# Patient Record
Sex: FEMALE | Race: WHITE | Employment: OTHER | URBAN - METROPOLITAN AREA
[De-identification: names, ages, dates, MRNs, and addresses within clinical notes are randomized per-mention and may not be internally consistent; named-entity substitution may affect disease eponyms.]

---

## 2018-01-05 ENCOUNTER — APPOINTMENT (OUTPATIENT)
Dept: CT IMAGING | Age: 83
DRG: 605 | End: 2018-01-05
Attending: EMERGENCY MEDICINE
Payer: MEDICARE

## 2018-01-05 ENCOUNTER — HOSPITAL ENCOUNTER (INPATIENT)
Age: 83
LOS: 1 days | Discharge: HOME | DRG: 605 | End: 2018-01-08
Attending: EMERGENCY MEDICINE | Admitting: HOSPITALIST
Payer: MEDICARE

## 2018-01-05 ENCOUNTER — APPOINTMENT (OUTPATIENT)
Dept: GENERAL RADIOLOGY | Age: 83
DRG: 605 | End: 2018-01-05
Attending: EMERGENCY MEDICINE
Payer: MEDICARE

## 2018-01-05 DIAGNOSIS — S30.1XXA ABDOMINAL WALL HEMATOMA, INITIAL ENCOUNTER: Primary | ICD-10-CM

## 2018-01-05 PROBLEM — E78.00 HYPERCHOLESTEROLEMIA: Status: ACTIVE | Noted: 2018-01-05

## 2018-01-05 PROBLEM — I25.10 CAD (CORONARY ARTERY DISEASE): Status: ACTIVE | Noted: 2018-01-05

## 2018-01-05 PROBLEM — I25.2 HX OF MYOCARDIAL INFARCTION: Status: ACTIVE | Noted: 2018-01-05

## 2018-01-05 PROBLEM — I10 HYPERTENSION: Status: ACTIVE | Noted: 2018-01-05

## 2018-01-05 LAB
ALBUMIN SERPL-MCNC: 3.2 G/DL (ref 3.4–5)
ALBUMIN/GLOB SERPL: 1.1 {RATIO} (ref 0.8–1.7)
ALP SERPL-CCNC: 85 U/L (ref 45–117)
ALT SERPL-CCNC: 17 U/L (ref 13–56)
ANION GAP SERPL CALC-SCNC: 10 MMOL/L (ref 3–18)
AST SERPL-CCNC: 14 U/L (ref 15–37)
ATRIAL RATE: 47 BPM
BASOPHILS # BLD: 0 K/UL (ref 0–0.06)
BASOPHILS NFR BLD: 0 % (ref 0–2)
BILIRUB SERPL-MCNC: 0.7 MG/DL (ref 0.2–1)
BUN SERPL-MCNC: 20 MG/DL (ref 7–18)
BUN/CREAT SERPL: 16 (ref 12–20)
CALCIUM SERPL-MCNC: 9.7 MG/DL (ref 8.5–10.1)
CALCULATED R AXIS, ECG10: -35 DEGREES
CALCULATED T AXIS, ECG11: 116 DEGREES
CHLORIDE SERPL-SCNC: 104 MMOL/L (ref 100–108)
CK MB CFR SERPL CALC: NORMAL % (ref 0–4)
CK MB SERPL-MCNC: <1 NG/ML (ref 5–25)
CK SERPL-CCNC: 53 U/L (ref 26–192)
CO2 SERPL-SCNC: 24 MMOL/L (ref 21–32)
CREAT SERPL-MCNC: 1.22 MG/DL (ref 0.6–1.3)
DIAGNOSIS, 93000: NORMAL
DIFFERENTIAL METHOD BLD: ABNORMAL
EOSINOPHIL # BLD: 0 K/UL (ref 0–0.4)
EOSINOPHIL NFR BLD: 0 % (ref 0–5)
ERYTHROCYTE [DISTWIDTH] IN BLOOD BY AUTOMATED COUNT: 13.7 % (ref 11.6–14.5)
GLOBULIN SER CALC-MCNC: 2.9 G/DL (ref 2–4)
GLUCOSE SERPL-MCNC: 164 MG/DL (ref 74–99)
HCT VFR BLD AUTO: 33.8 % (ref 35–45)
HGB BLD-MCNC: 11.1 G/DL (ref 12–16)
INR PPP: 4.6 (ref 0.8–1.2)
LIPASE SERPL-CCNC: 47 U/L (ref 73–393)
LYMPHOCYTES # BLD: 2.6 K/UL (ref 0.9–3.6)
LYMPHOCYTES NFR BLD: 19 % (ref 21–52)
MCH RBC QN AUTO: 30.5 PG (ref 24–34)
MCHC RBC AUTO-ENTMCNC: 32.8 G/DL (ref 31–37)
MCV RBC AUTO: 92.9 FL (ref 74–97)
MONOCYTES # BLD: 0.7 K/UL (ref 0.05–1.2)
MONOCYTES NFR BLD: 5 % (ref 3–10)
NEUTS SEG # BLD: 10.3 K/UL (ref 1.8–8)
NEUTS SEG NFR BLD: 76 % (ref 40–73)
PLATELET # BLD AUTO: 273 K/UL (ref 135–420)
PMV BLD AUTO: 10.7 FL (ref 9.2–11.8)
POTASSIUM SERPL-SCNC: 5.3 MMOL/L (ref 3.5–5.5)
PROT SERPL-MCNC: 6.1 G/DL (ref 6.4–8.2)
PROTHROMBIN TIME: 42.3 SEC (ref 11.5–15.2)
Q-T INTERVAL, ECG07: 394 MS
QRS DURATION, ECG06: 88 MS
QTC CALCULATION (BEZET), ECG08: 409 MS
RBC # BLD AUTO: 3.64 M/UL (ref 4.2–5.3)
SODIUM SERPL-SCNC: 138 MMOL/L (ref 136–145)
TROPONIN I SERPL-MCNC: <0.02 NG/ML (ref 0–0.06)
VENTRICULAR RATE, ECG03: 65 BPM
WBC # BLD AUTO: 13.6 K/UL (ref 4.6–13.2)

## 2018-01-05 PROCEDURE — 36415 COLL VENOUS BLD VENIPUNCTURE: CPT | Performed by: EMERGENCY MEDICINE

## 2018-01-05 PROCEDURE — 83690 ASSAY OF LIPASE: CPT | Performed by: EMERGENCY MEDICINE

## 2018-01-05 PROCEDURE — 85025 COMPLETE CBC W/AUTO DIFF WBC: CPT | Performed by: EMERGENCY MEDICINE

## 2018-01-05 PROCEDURE — 99218 HC RM OBSERVATION: CPT

## 2018-01-05 PROCEDURE — 71045 X-RAY EXAM CHEST 1 VIEW: CPT

## 2018-01-05 PROCEDURE — 80053 COMPREHEN METABOLIC PANEL: CPT | Performed by: EMERGENCY MEDICINE

## 2018-01-05 PROCEDURE — 85610 PROTHROMBIN TIME: CPT | Performed by: EMERGENCY MEDICINE

## 2018-01-05 PROCEDURE — 93005 ELECTROCARDIOGRAM TRACING: CPT

## 2018-01-05 PROCEDURE — 74176 CT ABD & PELVIS W/O CONTRAST: CPT

## 2018-01-05 PROCEDURE — 99285 EMERGENCY DEPT VISIT HI MDM: CPT

## 2018-01-05 PROCEDURE — 74011250637 HC RX REV CODE- 250/637: Performed by: PHYSICIAN ASSISTANT

## 2018-01-05 PROCEDURE — 73564 X-RAY EXAM KNEE 4 OR MORE: CPT

## 2018-01-05 PROCEDURE — 74011250636 HC RX REV CODE- 250/636: Performed by: HOSPITALIST

## 2018-01-05 PROCEDURE — 82550 ASSAY OF CK (CPK): CPT | Performed by: EMERGENCY MEDICINE

## 2018-01-05 RX ORDER — ONDANSETRON 2 MG/ML
4 INJECTION INTRAMUSCULAR; INTRAVENOUS
Status: DISCONTINUED | OUTPATIENT
Start: 2018-01-05 | End: 2018-01-08 | Stop reason: HOSPADM

## 2018-01-05 RX ORDER — HYDROCODONE BITARTRATE AND ACETAMINOPHEN 5; 325 MG/1; MG/1
1 TABLET ORAL
Status: DISCONTINUED | OUTPATIENT
Start: 2018-01-05 | End: 2018-01-08 | Stop reason: HOSPADM

## 2018-01-05 RX ORDER — SODIUM CHLORIDE 9 MG/ML
50 INJECTION, SOLUTION INTRAVENOUS CONTINUOUS
Status: DISCONTINUED | OUTPATIENT
Start: 2018-01-05 | End: 2018-01-08 | Stop reason: HOSPADM

## 2018-01-05 RX ORDER — ACETAMINOPHEN 325 MG/1
650 TABLET ORAL
Status: DISCONTINUED | OUTPATIENT
Start: 2018-01-05 | End: 2018-01-08 | Stop reason: HOSPADM

## 2018-01-05 RX ADMIN — SODIUM CHLORIDE 50 ML/HR: 900 INJECTION, SOLUTION INTRAVENOUS at 16:08

## 2018-01-05 RX ADMIN — HYDROCODONE BITARTRATE AND ACETAMINOPHEN 1 TABLET: 5; 325 TABLET ORAL at 14:56

## 2018-01-05 NOTE — ED PROVIDER NOTES
EMERGENCY DEPARTMENT HISTORY AND PHYSICAL EXAM    Date: 1/5/2018  Patient Name: Eyad Rojas    History of Presenting Illness     Chief Complaint   Patient presents with    Knee Pain    Abdominal Pain         History Provided By: Patient    Chief Complaint: left abd pain  Duration: 1 week ago  Timing:  Gradual and Improving  Location: left abdomen  Modifying Factors: worse with movement  Associated Symptoms: nausea    Additional History (Context):   9:26 AM  Eyad Rojas is a 80 y.o. female presents to the emergency department via EMS C/O gradually improving 6/10 left sided abd pain onset 1 week ago with worsening last night. Pain is worse with movement. Associated sxs include nausea. Pt hasn't seen a doctor for it. PMHx of CAD, MI, hypoglycemia, IBS, HLD, HTN, and GI disorder. PSHx includes cholecystectomy and left TKR. Pt is a non smoker and an EtOH user. Pt reports allergy to Amoxicillin. Last BM was yesterday. Pt denies vomiting, constipation, diarrhea, Chest pain, fever, chills, SOB, and any other sxs or complaints. PCP: Claire Monroe MD    Current Outpatient Prescriptions   Medication Sig Dispense Refill    HYDROcodone-acetaminophen (NORCO) 5-325 mg per tablet Take 1 tablet by mouth every eight (8) hours as needed for Pain. 12 tablet 0    TRIAMTERENE PO Take  by mouth.  omeprazole (PRILOSEC) 20 mg capsule Take 20 mg by mouth daily.  METOCLOPRAMIDE HCL (REGLAN PO) Take 10 mg by mouth daily.  VALSARTAN (DIOVAN PO) Take 320 mg by mouth daily.  warfarin (COUMADIN) 2.5 mg tablet Take 2.5 mg by mouth daily.  PROMETHAZINE HCL (PROMETHAZINE PO) Take 12.5 mg by mouth every six (6) hours as needed.  ATORVASTATIN CALCIUM (LIPITOR PO) Take 10 mg by mouth daily.          Past History     Past Medical History:  Past Medical History:   Diagnosis Date    Arthritis     Atrial fibrillation ([address]     CAD (coronary artery disease)     Edema     Gastrointestinal disorder     High cholesterol     Hypertension     Hypoglycemia     Hypoglycemia     IBS (irritable bowel syndrome)     MI, old        Past Surgical History:  Past Surgical History:   Procedure Laterality Date    HX CHOLECYSTECTOMY      HX KNEE REPLACEMENT      HX ORTHOPAEDIC      Left TKR       Family History:  History reviewed. No pertinent family history. Social History:  Social History   Substance Use Topics    Smoking status: Never Smoker    Smokeless tobacco: None    Alcohol use 0.5 oz/week     1 Glasses of wine per week      Comment: every few weeks       Allergies: Allergies   Allergen Reactions    Amoxicillin Rash         Review of Systems   Review of Systems   Constitutional: Negative for chills and fever. Respiratory: Negative for shortness of breath. Cardiovascular: Negative for chest pain. Gastrointestinal: Positive for abdominal pain and nausea. Negative for constipation, diarrhea and vomiting. All other systems reviewed and are negative. Physical Exam     Vitals:    01/05/18 0927 01/05/18 1000 01/05/18 1051 01/05/18 1100   BP: 110/76 103/67  114/57   Pulse: 61 69 (!) 58    Resp: 16 15 18    Temp: 97.5 °F (36.4 °C)      SpO2: 96% 97% 98%      Physical Exam   Constitutional: She is oriented to person, place, and time. She appears well-developed and well-nourished. No distress. Elderly female, no acute distress. HENT:   Head: Normocephalic and atraumatic. Eyes: Pupils are equal, round, and reactive to light. Neck: Neck supple. Cardiovascular: Normal rate, regular rhythm, S1 normal, S2 normal and normal heart sounds. Pulmonary/Chest: Breath sounds normal. No respiratory distress. She has no wheezes. She has no rales. She exhibits no tenderness. Abdominal: Soft. She exhibits no distension and no mass. There is tenderness. There is guarding. There is no rebound. Diffuse abd TTP which is worse in left upper and lower. Voluntary guarding present without rebound. Musculoskeletal: Normal range of motion. She exhibits no edema or tenderness. Neurological: She is alert and oriented to person, place, and time. No cranial nerve deficit. Skin: No rash noted. Psychiatric: She has a normal mood and affect. Her behavior is normal. Thought content normal.   Nursing note and vitals reviewed. Diagnostic Study Results     Labs -     Recent Results (from the past 12 hour(s))   CBC WITH AUTOMATED DIFF    Collection Time: 01/05/18  9:24 AM   Result Value Ref Range    WBC 13.6 (H) 4.6 - 13.2 K/uL    RBC 3.64 (L) 4.20 - 5.30 M/uL    HGB 11.1 (L) 12.0 - 16.0 g/dL    HCT 33.8 (L) 35.0 - 45.0 %    MCV 92.9 74.0 - 97.0 FL    MCH 30.5 24.0 - 34.0 PG    MCHC 32.8 31.0 - 37.0 g/dL    RDW 13.7 11.6 - 14.5 %    PLATELET 866 283 - 517 K/uL    MPV 10.7 9.2 - 11.8 FL    NEUTROPHILS 76 (H) 40 - 73 %    LYMPHOCYTES 19 (L) 21 - 52 %    MONOCYTES 5 3 - 10 %    EOSINOPHILS 0 0 - 5 %    BASOPHILS 0 0 - 2 %    ABS. NEUTROPHILS 10.3 (H) 1.8 - 8.0 K/UL    ABS. LYMPHOCYTES 2.6 0.9 - 3.6 K/UL    ABS. MONOCYTES 0.7 0.05 - 1.2 K/UL    ABS. EOSINOPHILS 0.0 0.0 - 0.4 K/UL    ABS.  BASOPHILS 0.0 0.0 - 0.06 K/UL    DF AUTOMATED     PROTHROMBIN TIME + INR    Collection Time: 01/05/18 10:00 AM   Result Value Ref Range    Prothrombin time 42.3 (H) 11.5 - 15.2 sec    INR 4.6 (H) 0.8 - 1.2     EKG, 12 LEAD, INITIAL    Collection Time: 01/05/18 10:17 AM   Result Value Ref Range    Ventricular Rate 65 BPM    Atrial Rate 47 BPM    QRS Duration 88 ms    Q-T Interval 394 ms    QTC Calculation (Bezet) 409 ms    Calculated R Axis -35 degrees    Calculated T Axis 116 degrees    Diagnosis       Atrial fibrillation  Left axis deviation  Anterior infarct (cited on or before 08-FEB-2013)  Abnormal ECG  When compared with ECG of 10-DEC-2014 20:50,  No significant change was found     LIPASE    Collection Time: 01/05/18 10:29 AM   Result Value Ref Range    Lipase 47 (L) 73 - 393 U/L   CARDIAC PANEL,(CK, CKMB & TROPONIN) Collection Time: 01/05/18 10:29 AM   Result Value Ref Range    CK 53 26 - 192 U/L    CK - MB <1.0 <3.6 ng/ml    CK-MB Index  0.0 - 4.0 %     CALCULATION NOT PERFORMED WHEN RESULT IS BELOW LINEAR LIMIT    Troponin-I, Qt. <0.02 0.00 - 4.34 NG/ML   METABOLIC PANEL, COMPREHENSIVE    Collection Time: 01/05/18 10:29 AM   Result Value Ref Range    Sodium 138 136 - 145 mmol/L    Potassium 5.3 3.5 - 5.5 mmol/L    Chloride 104 100 - 108 mmol/L    CO2 24 21 - 32 mmol/L    Anion gap 10 3.0 - 18 mmol/L    Glucose 164 (H) 74 - 99 mg/dL    BUN 20 (H) 7.0 - 18 MG/DL    Creatinine 1.22 0.6 - 1.3 MG/DL    BUN/Creatinine ratio 16 12 - 20      GFR est AA 51 (L) >60 ml/min/1.73m2    GFR est non-AA 42 (L) >60 ml/min/1.73m2    Calcium 9.7 8.5 - 10.1 MG/DL    Bilirubin, total 0.7 0.2 - 1.0 MG/DL    ALT (SGPT) 17 13 - 56 U/L    AST (SGOT) 14 (L) 15 - 37 U/L    Alk. phosphatase 85 45 - 117 U/L    Protein, total 6.1 (L) 6.4 - 8.2 g/dL    Albumin 3.2 (L) 3.4 - 5.0 g/dL    Globulin 2.9 2.0 - 4.0 g/dL    A-G Ratio 1.1 0.8 - 1.7         Radiologic Studies -   CT ABD PELV WO CONT   Final Result      XR CHEST PORT   Final Result      XR KNEE RT MIN 4 V    (Results Pending)     CT Results  (Last 48 hours)               01/05/18 1112  CT ABD PELV WO CONT Final result    Impression:  IMPRESSION:       1. Moderately large left-sided rectus sheath hematoma with small amount of   contiguous intrapelvic, extraperitoneal blood products. 2. Small hiatal hernia. Diverticulosis without evidence of diverticulitis. 3. Right adnexal cyst, slightly enlarged in the interval from prior exam of   1/17/2015. As previously, nonemergent outpatient ultrasound follow-up is   recommended if not performed in the interval from prior CT exam.   4. Prior cholecystectomy and common bile duct reservoir effect. Narrative:  EXAM: CT of the abdomen and pelvis       INDICATION: Pelvic pain, radiating to the back.        COMPARISON: 1/17/2015       TECHNIQUE: Axial CT imaging of the abdomen and pelvis was performed without oral   or intravenous contrast. Multiplanar reformats were generated. One or more dose reduction techniques were used on this CT: automated exposure   control, adjustment of the mAs and/or kVp according to patient's size, and   iterative reconstruction techniques. The specific techniques utilized on this CT   exam have been documented in the patient's electronic medical record.        _______________       FINDINGS:       LOWER CHEST: Minor dependent atelectasis is present without evidence of focal   pneumonic opacity or pleural effusion. Cardiac size is normal. There is no   pericardial effusion. LIVER, BILIARY: The unenhanced appearance of the liver is within normal limits. No intrahepatic biliary ductal dilatation. Postop changes of cholecystectomy   with reservoir effect redemonstrated involving the common bile duct, similar to   slightly decreased in magnitude from prior abdominal pelvic CT exam.       PANCREAS: Mild diffuse fatty infiltration of the pancreas is present. SPLEEN: Normal.       ADRENALS: Normal.       KIDNEYS/URETERS/BLADDER: No hydronephrosis or nephrolithiasis. PELVIC ORGANS: Uterus has an unremarkable CT appearance. Hypoattenuating oval   right adnexal nodule redemonstrated, measuring approximately 6.3 cm in size. VASCULATURE: Diffuse aortobiiliac atherosclerotic calcification is present   without evidence of aneurysmal dilatation. LYMPH NODES: There are scattered subcentimeter mesenteric and retroperitoneal   lymph nodes, without evidence of abdominal or pelvic adenopathy. GASTROINTESTINAL TRACT: A small hiatal hernia is present. Small intestine and   large intestine are normal in course and caliber. No bowel obstruction. No free   intraperitoneal gas. Extensive colonic diverticulosis is present without   evidence to suggest associated diverticulitis.        BONES: No acute or aggressive osseous abnormalities identified. Levorotatory   scoliosis is redemonstrated. Transitional lumbosacral anatomy is present, with   lumbarization of the first sacral segment. There is anterolisthesis of L5 on S1,   unchanged, with multilevel spot spondylosis and lower lumbar facet   osteoarthrosis present. OTHER: Within the substance of the left rectus abdominis musculature, there is a   hematoma present which measures approximately 10.0 x 6.5 x 16 cm. There is a   small amount of contiguous extraperitoneal blood products present within the   superior and left lateral portions of the pelvis, with mild mass effect on the   adjacent bladder.       _______________                 As read by the radiologist.   CXR Results  (Last 48 hours)               01/05/18 1031  XR CHEST PORT Final result    Impression:  Impression:   No radiographic evidence of an acute abnormality. Narrative:  Chest, single view       Indication: Chest and upper abdominal pain       Comparison: 1/17/2015       Findings:  Portable upright AP view of the chest was obtained. The   cardiomediastinal silhouette is within normal limits. Tortuous and   atherosclerotic thoracic aorta. No central pulmonary vascular congestion. Lungs   are hyperinflated. No acute airspace opacity. Mild central bronchiectasis. Minimal left basilar atelectasis/scarring. No pleural effusion nor   pneumothorax. No acute osseous abnormality. As read by the radiologist.     Medications given in the ED-  Medications - No data to display      Medical Decision Making   I am the first provider for this patient. I reviewed the vital signs, available nursing notes, past medical history, past surgical history, family history and social history. Vital Signs-Reviewed the patient's vital signs. Pulse Oximetry Analysis - 96% on room air. Cardiac Monitor:  Rate: 65 bpm  Rhythm: AFIB    EKG interpretation: (Preliminary)  AFIB at 65 bpm. LAD.  Anterior infarct, age undetermined. EKG read by Maame Webster MD at 10:17 AM     Records Reviewed: Nursing Notes    Provider Notes (Medical Decision Making):   INITIAL CLINICAL IMPRESSION and PLANS:  The patient presents with the primary complaint(s) of: abd pain. r/o pancreatitis, diverticulitis, colitis, IBS, electrolyte abnormalities, ischemic bowel    Considering the above, my initial management plan to evaluate and therapeutic interventions include the following and as noted in the orders:    1.  CBC, CMP, UA, Cardiac Panel, Lipase  2. CT Abd pelv, CXR    Recorded by Lazara Walker ED Scribe, as dictated by Maame Webster MD.     Procedures:  Procedures    ED Course:   9:26 AM Initial assessment performed. The patients presenting problems have been discussed, and they are in agreement with the care plan formulated and outlined with them. I have encouraged them to ask questions as they arise throughout their visit. 11:47 AM   New angela Daughter at bedside states that pt fell out of bed when she got up to go to the bathroom. Pt got dizzy and fell without LOC. 11:52 AM Discussed patient's history, exam, and available diagnostics results with Jarad Shannon MD (General Surgery), who advised that we admit pt to Medical Floor and that we apply ice packs. 12:22 PM Discussed patient's history, exam, and available diagnostics results with Helio Alcantara MD, internal medicine, who agree with admitting pt to the medical floor. Diagnosis and Disposition       Critical Care Time:     Core Measures:  For Hospitalized Patients:    1. Hospitalization Decision Time:  The decision to hospitalize the patient was made by Maame Webster MD at 11:52 AM on 1/5/2018    2.  Aspirin: Aspirin was not given because the patient is a bleeding risk    11:51 AM  Patient is being admitted to the hospital by Helio Alcantara MD. The results of their tests and reasons for their admission have been discussed with them and/or available family. They convey agreement and understanding for the need to be admitted and for their admission diagnosis. CONDITIONS ON ADMISSION:  Sepsis is not present at the time of admission. Deep Vein Thrombosis is not present at the time of admission. Thrombosis is not present at the time of admission. Urinary Tract Infection is not present at the time of admission. Pneumonia is not present at the time of admission. MRSA is not present at the time of admission. Wound infection is not present at the time of admission. Pressure Ulcer is not present at the time of admission. CLINICAL IMPRESSION:    1. Abdominal wall hematoma, initial encounter        PLAN:  1. ADMIT    _______________________________    Attestations: This note is prepared by Darshan Mariee, acting as Scribe for Roseline Walker MD.    Roseline Walker MD:  The scribe's documentation has been prepared under my direction and personally reviewed by me in its entirety.   I confirm that the note above accurately reflects all work, treatment, procedures, and medical decision making performed by me.  _______________________________

## 2018-01-05 NOTE — PROGRESS NOTES
Pharmacy Dosing Services: Warfarin    Consult for Warfarin Dosing by Pharmacy by JOSE RAFAEL Sprague  Consult provided for this 80 y.o.  female , for indication of Atrial Fibrillation. Pt taking warfarin prior to admission on chronic anticoagulation. Dose to achieve an INR goal of 2-3    INR = 4.6   Warfarin dose HELD on 1/5/18    Significant drug interactions: none  LABS    PT/INR Lab Results   Component Value Date/Time    INR 4.6 01/05/2018 10:00 AM      Platelets Lab Results   Component Value Date/Time    PLATELET 997 63/71/4940 09:24 AM      H/H     Lab Results   Component Value Date/Time    HGB 11.1 01/05/2018 09:24 AM        Warfarin Administrations (last 168 hours)       None          Pharmacy to follow daily and will provide subsequent Warfarin dosing based on clinical status.   JOSÉ Langley  Contact information  [phone]

## 2018-01-05 NOTE — ROUTINE PROCESS
TRANSFER - OUT REPORT:    Verbal Bedside report given to [facility] RN on Wileen Few  being transferred to [facility] bed 336 for routine progression of care       Report consisted of patients Situation, Background, Assessment and   Recommendations(SBAR). Information from the following report(s) SBAR, Kardex, ED Summary, Recent Results and Cardiac Rhythm A-Fib was reviewed with the receiving nurse. Lines:   Peripheral IV 01/05/18 Right Hand (Active)   Site Assessment Clean, dry, & intact 1/5/2018 10:08 AM   Phlebitis Assessment 0 1/5/2018 10:08 AM   Infiltration Assessment 0 1/5/2018 10:08 AM   Dressing Status Clean, dry, & intact; Occlusive 1/5/2018 10:08 AM   Dressing Type Transparent 1/5/2018 10:08 AM   Hub Color/Line Status Pink;Flushed;Patent 1/5/2018 10:08 AM   Action Taken Blood drawn 1/5/2018 10:08 AM        Opportunity for questions and clarification was provided.       Patient transported with:   Monitor  Registered Nurse  Tech

## 2018-01-05 NOTE — IP AVS SNAPSHOT
[address] [address] 
[phone] Patient: Jeremy Marsh MRN: [id number] FKN:[id number] About your hospitalization You were admitted on:  January 5, 2018 You last received care in the:  [facility] [facility] You were discharged on:  January 8, 2018 Why you were hospitalized Your primary diagnosis was:  Abdominal Wall Hematoma Your diagnoses also included:  Cad (Coronary Artery Disease), Hypercholesterolemia, Hypertension, Hx Of Myocardial Infarction, Elevated Inr Follow-up Information Follow up With Details Comments Contact Info Zachary Lai MD Schedule an appointment as soon as possible for a visit in 3 days For follow up regarding recent hospitalization [address] [facility] [address] [facility] 
[phone] Discharge Orders None A check julissa indicates which time of day the medication should be taken. My Medications START taking these medications Instructions Each Dose to Equal  
 Morning Noon Evening Bedtime  
 aspirin 81 mg chewable tablet Your last dose was: Your next dose is: Take 1 Tab by mouth daily. 81 mg CONTINUE taking these medications Instructions Each Dose to Equal  
 Morning Noon Evening Bedtime DIOVAN PO Your last dose was: Your next dose is: Take 320 mg by mouth daily. 320 mg HYDROcodone-acetaminophen 5-325 mg per tablet Commonly known as:  Sherley Ellington Your last dose was: Your next dose is: Take 1 tablet by mouth every eight (8) hours as needed for Pain. 1 Tab  
    
   
   
   
  
 LIPITOR PO Your last dose was: Your next dose is: Take 10 mg by mouth daily. 10 mg  
    
   
   
   
  
 omeprazole 20 mg capsule Commonly known as:  PRILOSEC Your last dose was: Your next dose is: Take 20 mg by mouth daily. 20 mg PROMETHAZINE PO Your last dose was: Your next dose is: Take 12.5 mg by mouth every six (6) hours as needed. 12.5 mg  
    
   
   
   
  
 REGLAN PO Your last dose was: Your next dose is: Take 10 mg by mouth daily. 10 mg  
    
   
   
   
  
 TRIAMTERENE PO Your last dose was: Your next dose is: Take  by mouth. STOP taking these medications COUMADIN 2.5 mg tablet Generic drug:  warfarin Where to Get Your Medications Information on where to get these meds will be given to you by the nurse or doctor. ! Ask your nurse or doctor about these medications  
  aspirin 81 mg chewable tablet Discharge Instructions DISCHARGE SUMMARY from Nurse PATIENT INSTRUCTIONS: 
 
 
F-face looks uneven A-arms unable to move or move unevenly S-speech slurred or non-existent T-time-call 911 as soon as signs and symptoms begin-DO NOT go Back to bed or wait to see if you get better-TIME IS BRAIN. Warning Signs of HEART ATTACK Call 911 if you have these symptoms: 
? Chest discomfort. Most heart attacks involve discomfort in the center of the chest that lasts more than a few minutes, or that goes away and comes back. It can feel like uncomfortable pressure, squeezing, fullness, or pain. ? Discomfort in other areas of the upper body. Symptoms can include pain or discomfort in one or both arms, the back, neck, jaw, or stomach. ? Shortness of breath with or without chest discomfort. ? Other signs may include breaking out in a cold sweat, nausea, or lightheadedness. Don't wait more than five minutes to call [address]! Fast action can save your life. Calling 911 is almost always the fastest way to get lifesaving treatment. Emergency Medical Services staff can begin treatment when they arrive  up to an hour sooner than if someone gets to the hospital by car. The discharge information has been reviewed with the patient. The patient verbalized understanding. Discharge medications reviewed with the patient and appropriate educational materials and side effects teaching were provided. ___________________________________________________________________________________________________________________________________ Taking Aspirin to Prevent Heart Attack and Stroke: Care Instructions Your Care Instructions Aspirin acts as a \"blood thinner. \" It prevents blood clots from forming. When taken during and after a heart attack, it can reduce your chance of dying. And it's used if you have a stent in your coronary artery. Also, aspirin helps certain people lower their risk of a heart attack or stroke. Be sure you know what dose of aspirin to take and how often to take it. Low-dose aspirin is typically 81 mg. But the dose for daily aspirin can range from 81 mg to 325 mg. Taking aspirin every day can cause bleeding. It may not be safe if you have stomach ulcers. And it may not be safe if you have high blood pressure that is not controlled. If you take aspirin pills every day, do not take ones that have other ingredients such as caffeine or sodium. Before you start to take aspirin, tell your doctor all the medicines, vitamins, herbal products, and supplements you take. Follow-up care is a key part of your treatment and safety. Be sure to make and go to all appointments, and call your doctor if you are having problems. It's also a good idea to know your test results and keep a list of the medicines you take. How can you care for yourself at home? · Take aspirin with a full glass of water unless your doctor tells you not to. Do not lie down right after you take it. · If you have a stent in your coronary artery, take your aspirin as your heart doctor says to. If another doctor says to stop taking the aspirin for any reason, talk to your heart doctor before you stop. · Do not chew or crush the coated or sustained-release forms of aspirin. · Ask your doctor if you can drink alcohol while you take aspirin. And ask how much you can drink. Too much alcohol with aspirin can cause stomach bleeding. · Do not take aspirin if you are pregnant, unless your doctor says it is okay. · Keep all aspirin out of children's reach. · Throw aspirin away if it starts to smell like vinegar. · Do not take aspirin if you have gout or if you take prescription blood thinners, unless your doctor has told you to. · Do not take prescription or over-the-counter medicines, vitamins, herbal products, or supplements without talking to your doctor first. Miguel Guzmanl the label before you take another over-the-counter medicine. Many contain aspirin. So they could cause you to take too much aspirin. · Talk with your doctor before you take a pain medicine. Ask which type of medicine you can take and how to take it safely with aspirin. · Tell your doctor or dentist before a surgery or procedure that you take aspirin. He or she will tell you if you should stop taking aspirin before your surgery or procedure. Make sure that you understand exactly what your doctor wants you to do. Where can you learn more? Go to http://juan jose-cindy.info/. Enter O976 in the search box to learn more about \"Taking Aspirin to Prevent Heart Attack and Stroke: Care Instructions. \" Current as of: September 21, 2016 Content Version: 11.4 © 1209-6023 [employer].  Care instructions adapted under license by [employer] (which disclaims liability or warranty for this information). If you have questions about a medical condition or this instruction, always ask your healthcare professional. [place] [address] any warranty or liability for your use of this information. Learning About Your Stroke Risk When You Have Atrial Fibrillation How does atrial fibrillation affect your stroke risk? Normally, the heart beats in a strong, steady rhythm. In atrial fibrillation, the two upper parts of the heart (the atria) quiver, or fibrillate, and the heart does not beat in a regular rhythm. Your heart rate also may be faster than normal. 
This can be dangerous because if the heartbeat isn't strong and steady, blood can collect, or pool, in the heart. And pooled blood is more likely to form clots. Clots can travel to the brain, block blood flow, and cause a stroke. A stroke can cause sudden numbness or weakness of the face, arm, or leg, especially on one side of the body. Strokes can also cause sudden confusion, trouble speaking or understanding, or even trouble seeing in one or both eyes. Strokes can even cause death. Atrial fibrillation increases your stroke risk. But not everyone with atrial fibrillation has the same stroke risk. How do you know what your stroke risk is? Your doctor can help you know your risk based on your age, sex, and health. Things that raise your risk are called risk factors. The more risk factors you have, the higher your risk. Risk factors for stroke include: · Age. Being older than 72 raises your risk. · Being female. Women are at higher risk. · Other health problems. You may have other health problems that raise your risk. These include: 
¨ Heart failure. ¨ High blood pressure. ¨ A previous stroke or transient ischemic attack (TIA). ¨ Heart attack, peripheral arterial disease, or other blood vessel disease. ¨ Diabetes.  
Your doctor will use a kind of scorecard to add up your risk factors and estimate your risk for stroke. This score can help you and your doctor decide how to lower your risk. Should you take medicine to lower your stroke risk? When you know what your stroke risk is, you and your doctor can talk about your options. You'll decide whether or not to take medicine-aspirin or anticoagulants-to help prevent blood clots. These medicines are often called blood thinners, but they don't actually thin your blood. Instead, they increase the time it takes for a blood clot to form. Blood thinners lower the risk of stroke in people who have atrial fibrillation. But how much your risk will be lowered depends on how high your risk was to start with. There are risks to taking a blood thinner. When your blood clots more slowly, you have a higher risk of bleeding problems. These problems include bleeding problems in and around the brain, bleeding in the stomach and intestines, bruising and bleeding if you are hurt, and serious skin rash. You and your doctor can compare your risk of stroke with your risk of bleeding from the medicine. You can also discuss how you feel about taking medicine every day. Follow-up care is a key part of your treatment and safety. Be sure to make and go to all appointments, and call your doctor if you are having problems. It's also a good idea to know your test results and keep a list of the medicines you take. Where can you learn more? Go to http://juan jose-cindy.info/. Enter O716 in the search box to learn more about \"Learning About Your Stroke Risk When You Have Atrial Fibrillation. \" Current as of: September 21, 2016 Content Version: 11.4 © 4017-1191 Healthwise, Incorporated. Care instructions adapted under license by [employer] (which disclaims liability or warranty for this information).  If you have questions about a medical condition or this instruction, always ask your healthcare professional. April Boyd[employer] disclaims any warranty or liability for your use of this information. High INR Test Result: Care Instructions Your Care Instructions You had a blood test to check how long it takes your blood to clot. This test is called a PT or prothrombin time test. The result of the test is called the INR level. A high INR level can happen when you take warfarin (Coumadin). Warfarin helps prevent blood clots. To do this, it slows the amount of time it takes for your blood to clot. This raises your INR level. The INR goal for people who take warfarin is usually from 2 to 3.5. A value higher than 3.5 increases the risk of bleeding problems. Many things can affect the way warfarin works. Some natural health products and other medicines can make warfarin work too well. That can raise the risk of bleeding. If you drink a lot of alcohol, that may raise your INR. And severe diarrhea or vomiting can also raise your INR. The best way to lower your INR will depend on several things. In some cases, the doctor may have you stop taking warfarin for a few days. You may also be given other medicines to take. You will need to be tested often to make sure your INR level is going down. You will also need to watch for signs of bleeding. The doctor has checked you carefully, but problems can develop later. If you notice any problems or new symptoms, get medical treatment right away. Follow-up care is a key part of your treatment and safety. Be sure to make and go to all appointments, and call your doctor if you are having problems. It's also a good idea to know your test results and keep a list of the medicines you take. How can you care for yourself at home? Be careful with medicines and foods · Don't start or stop taking any medicines, vitamins, or natural remedies unless you first talk to your doctor.  
· Keep the amount of vitamin K in your diet about the same from day to day. Do not suddenly eat a lot more or a lot less food that is rich in vitamin K than you usually do. Vitamin K affects how warfarin works and how your blood clots. · Avoid cranberry juice and other cranberry products. They can increase the effects of warfarin. · Limit your use of alcohol. Avoid bleeding by preventing falls · Wear slippers or shoes with nonskid soles. · Remove throw rugs and clutter. · Rearrange furniture and electrical cords to keep them out of walking paths. · Keep stairways, porches, and outside walkways well lit. Use night-lights in hallways and bathrooms. · Be extra careful when you work with sharp tools or knives. When should you call for help? Call 911 anytime you think you may need emergency care. For example, call if: 
· You have a sudden, severe headache that is different from past headaches. Call your doctor now or seek immediate medical care if: 
· You have any abnormal bleeding, such as: 
¨ Nosebleeds. ¨ Vaginal bleeding that is different (heavier, more frequent, at a different time of the month) than what you are used to. ¨ Bloody or black stools, or rectal bleeding. ¨ Bloody or pink urine. Watch closely for changes in your health, and be sure to contact your doctor if you have any problems. Where can you learn more? Go to [employer]D [employer].be Enter C178 in the search box to learn more about \"High INR Test Result: Care Instructions. \"  
© 0153-5529 Healthwise, Incorporated. Care instructions adapted under license by [facility] (which disclaims liability or warranty for this information). This care instruction is for use with your licensed healthcare professional. If you have questions about a medical condition or this instruction, always ask your healthcare professional. [place] [address] any warranty or liability for your use of this information. Content Version: 91.3.609247; Current as of: November 20, 2015 Patient armband removed and shredded [employer] Announcement We are excited to announce that we are making your provider's discharge notes available to you in Art Sumo. You will see these notes when they are completed and signed by the physician that discharged you from your recent hospital stay. If you have any questions or concerns about any information you see in Art Sumo, please call the Health Information Department where you were seen or reach out to your Primary Care Provider for more information about your plan of care. Introducing [facility]! [facility] introduces Art Sumo patient portal. Now you can access parts of your medical record, email your doctor's office, and request medication refills online. 1. In your internet browser, go to [email or web address] 2. Click on the First Time User? Click Here link in the Sign In box. You will see the New Member Sign Up page. 3. Enter your Art Sumo Access Code exactly as it appears below. You will not need to use this code after youve completed the sign-up process. If you do not sign up before the expiration date, you must request a new code. · Art Sumo Access Code: [id number] Expires: 4/8/2018 12:39 PM 
 
4. Enter the last four digits of your Social Security Number (xxxx) and Date of Birth (mm/dd/yyyy) as indicated and click Submit. You will be taken to the next sign-up page. 5. Create a Art Sumo ID. This will be your Art Sumo login ID and cannot be changed, so think of one that is secure and easy to remember. 6. Create a Art Sumo password. You can change your password at any time. 7. Enter your Password Reset Question and Answer. This can be used at a later time if you forget your password. 8. Enter your e-mail address. You will receive e-mail notification when new information is available in [address] 9. Click Sign Up. You can now view and download portions of your medical record. 10. Click the Download Summary menu link to download a portable copy of your medical information. If you have questions, please visit the Frequently Asked Questions section of the [employer] website. Remember, [employer] is NOT to be used for urgent needs. For medical emergencies, dial 911. Now available from your iPhone and Android! Providers Seen During Your Hospitalization Provider Specialty Primary office phone Sandro Bassett MD Emergency Medicine [phone] Jenifer Manzanares MD Family Practice [phone] Your Primary Care Physician (PCP) Primary Care Physician Office Phone Office Fax [address], [address] [facility] [place] [phone] You are allergic to the following Allergen Reactions Amoxicillin Rash Recent Documentation Weight BMI OB Status Smoking Status 75.3 kg 30.34 kg/m2 Menopause Never Smoker Emergency Contacts Name Discharge Info Relation Home Work Mobile VashtiJuan DISCHARGE CAREGIVER [3] Child [2] [phone] Patient Belongings The following personal items are in your possession at time of discharge: 
     Visual Aid: Glasses, At bedside   Hearing Aids/Status: At home  Home Medications: None   Jewelry: Earrings       Other Valuables: Cell Phone Please provide this summary of care documentation to your next provider. Signatures-by signing, you are acknowledging that this After Visit Summary has been reviewed with you and you have received a copy. Patient Signature:  ____________________________________________________________ Date:  ____________________________________________________________  
  
Quinn Jaquez Provider Signature:  ____________________________________________________________ Date:  ____________________________________________________________

## 2018-01-05 NOTE — ROUTINE PROCESS
TRANSFER - IN REPORT:    Verbal report received from [facility] RN(name) on Stephany Mcadams  being received from ED(unit) for routine progression of care      Report consisted of patients Situation, Background, Assessment and   Recommendations(SBAR). Information from the following report(s) SBAR, Kardex, ED Summary, Procedure Summary, Intake/Output, MAR, Med Rec Status, Cardiac Rhythm Afib and Alarm Parameters  was reviewed with the receiving nurse. Opportunity for questions and clarification was provided. Assessment completed upon patients arrival to unit and care assumed.

## 2018-01-05 NOTE — H&P
History & Physical    Patient: Maura Dailey MRN: [id number]  CSN: [id number]    YOB: 1930  Age: 80 y.o. Sex: female      DOA: 1/5/2018  Primary Care Provider:  Emilie Mckeon MD      Assessment/Plan     Patient Active Problem List   Diagnosis Code    Abdominal pain, other specified site R10.9    Atrial fibrillation ([address] I48.91    Constipation K59.00    Encephalopathy, unspecified G93.40    Abdominal wall hematoma S30. 1XXA    CAD (coronary artery disease) I25.10    Hypercholesterolemia E78.00    Hypertension I10    Hx of myocardial infarction I25.2       1. Abdominal Wall Hematoma  2. Atrial Fibrillation, Chronic  3. Chronic Anticoagulation on Coumadin  4. CAD  5. Hypertension  6. Hx of MI  7. Knee Pain    1. Admit for further care. ED provider spoke to Dr Reji Cao in regards to this patient - Dr Reji Cao said to admit and apply ice packs to the area where the hematoma is located. Will add Tylenol for mild pain control, fevers, or headaches, and add Norco 5-325mg for moderate pain. 2. Patient has a hx of atrial fibrillation and currently is on coumadin. The patient's INR in the ED this afternoon was 4.6. Placed an order for pharmacy to assist in dosing coumadin, likely to be held until INR back to therapeutic range. HR is controlled at this time, hovering in the 60's at time of exam. Patient is unsure of medications that she takes at home. She knows that she takes coumadin, but unsure of meds for afib. Will have her son bring her medication list home and we will update it when received. 3. As above. Pharmacy to assist in dosing. 4. Patient reported, but as aforementioned unsure of current medication list. Will update when available. 5. BP was low-normal at time of exam in the ED, at 101/50mmHg. Will hold antihypertensive medications at this time until med list has been received. Per the patient's daughter, she has not received any of her medications today.   7. Complaints of knee pain after a fall, underwent knee radiographs in the ED. Reading is pending. Will have PT/OT eval and treat as the patient tolerates it. DVT Prophylaxis - Supratherapeutic INR currently, will have pharmacy assist in dosing coumadin. Dispo: 1-2 days. Will need PT to determine functional status and give recs. Will await further surgical input if needed. Estimated length of stay : 1-2 nights. CC: Abdominal Pain       HPI:     Stephany Mcadams is a 80 y.o. female who has a history of HTN, atrial fibrillation, CAD, hypercholesterolemia, hx of MI, episodes of hypoglycemia, and PSHx of cholecystectomy and knee replacement presents to the ED today with complaints of abdominal pain. She states the pain has actually improved over the last day or so, but has been present since around Barbara. The patient states that this morning she awoke and went to use the lavatory and one way or another, she does not recall, ended up on the ground. She denies LOC, did not hit her head. She states her son, who lives with her, assisted her up off of the floor, and the patient then called EMS to transport her to the ED. In the ED, she was found to have an abdominal wall hematoma, and medicine has been asked to admit for further care. The patient admits to occasional drinking, denies smoking. Complaints of abdominal pain and right knee pain. Denies any vomiting, constipation, diarrhea, chills, SOB. Past Medical History:   Diagnosis Date    Arthritis     Atrial fibrillation ([address]     CAD (coronary artery disease)     Edema     Gastrointestinal disorder     High cholesterol     Hypertension     Hypoglycemia     Hypoglycemia     IBS (irritable bowel syndrome)     MI, old        Past Surgical History:   Procedure Laterality Date    HX CHOLECYSTECTOMY      HX KNEE REPLACEMENT      HX ORTHOPAEDIC      Left TKR       History reviewed. No pertinent family history.     Social History     Social History    Marital status:      Spouse name: N/A    Number of children: N/A    Years of education: N/A     Social History Main Topics    Smoking status: Never Smoker    Smokeless tobacco: None    Alcohol use 0.5 oz/week     1 Glasses of wine per week      Comment: every few weeks    Drug use: None    Sexual activity: Not Asked     Other Topics Concern    None     Social History Narrative       Prior to Admission medications    Medication Sig Start Date End Date Taking? Authorizing Provider   HYDROcodone-acetaminophen (NORCO) 5-325 mg per tablet Take 1 tablet by mouth every eight (8) hours as needed for Pain. 1/17/15   Rubens Tse MD   TRIAMTERENE PO Take  by mouth. Reinaldo Cordero MD   omeprazole (PRILOSEC) 20 mg capsule Take 20 mg by mouth daily. Historical Provider   METOCLOPRAMIDE HCL (REGLAN PO) Take 10 mg by mouth daily. Reinaldo Cordero MD   VALSARTAN (DIOVAN PO) Take 320 mg by mouth daily. Reinaldo Cordero MD   warfarin (COUMADIN) 2.5 mg tablet Take 2.5 mg by mouth daily. Reinaldo Cordero MD   PROMETHAZINE HCL (PROMETHAZINE PO) Take 12.5 mg by mouth every six (6) hours as needed. Reinaldo Cordero MD   ATORVASTATIN CALCIUM (LIPITOR PO) Take 10 mg by mouth daily. Reinaldo Cordero MD       Allergies   Allergen Reactions    Amoxicillin Rash       Review of Systems  Gen: No fever, chills, malaise, weight loss/gain. Heent: No headache, rhinorrhea, epistaxis, ear pain, hearing loss, sinus pain, neck pain/stiffness, sore throat. Heart: No chest pain, palpitations, NEIL, pnd, or orthopnea. Resp: No cough, hemoptysis, wheezing and shortness of breath. GI: No nausea, vomiting, diarrhea, constipation, melena or hematochezia. Reports abdominal pain  : No urinary obstruction, dysuria or hematuria. Derm: No rash, new skin lesion or pruritis. Musc/skeletal: Reports right knee pain  Vasc: No edema, cyanosis or claudication. Endo: No heat/cold intolerance, no polyuria,polydipsia or polyphagia. Neuro:  No unilateral weakness, numbness, tingling. No seizures. Heme: No easy bruising or bleeding. Physical Exam:     Physical Exam:  Visit Vitals    BP 97/55    Pulse 63    Temp 97.5 °F (36.4 °C)    Resp 15    SpO2 96%      O2 Device: Room air    Temp (24hrs), Av.5 °F (36.4 °C), Min:97.5 °F (36.4 °C), Max:97.5 °F (36.4 °C)             General:  Awake, cooperative, no distress. Head:  Normocephalic, without obvious abnormality, atraumatic. Eyes:  Conjunctivae/corneas clear, sclera anicteric, PERRL, EOMs intact. Nose: Nares normal. No drainage or sinus tenderness. Throat: Lips, mucosa, and tongue normal.    Neck: Supple, symmetrical, trachea midline, no adenopathy. Lungs:   Clear to auscultation bilaterally. Heart:  Irregular rhythm, S1, S2 normal, no murmur, click, rub or gallop. Abdomen: Soft, diffusely tender, more focal tenderness in LUQ and LLQ with guarding. Bowel sounds normal. No masses,  No organomegaly. Extremities: Extremities normal, atraumatic, no cyanosis or edema. Capillary refill normal.   Pulses: 2+ and symmetric all extremities. Skin: Skin color pink, turgor normal. No rashes or lesions   Neurologic: CNII-XII intact. No focal motor or sensory deficit. Labs Reviewed: All lab results for the last 24 hours reviewed. Recent Results (from the past 24 hour(s))   CBC WITH AUTOMATED DIFF    Collection Time: 18  9:24 AM   Result Value Ref Range    WBC 13.6 (H) 4.6 - 13.2 K/uL    RBC 3.64 (L) 4.20 - 5.30 M/uL    HGB 11.1 (L) 12.0 - 16.0 g/dL    HCT 33.8 (L) 35.0 - 45.0 %    MCV 92.9 74.0 - 97.0 FL    MCH 30.5 24.0 - 34.0 PG    MCHC 32.8 31.0 - 37.0 g/dL    RDW 13.7 11.6 - 14.5 %    PLATELET 811 018 - 066 K/uL    MPV 10.7 9.2 - 11.8 FL    NEUTROPHILS 76 (H) 40 - 73 %    LYMPHOCYTES 19 (L) 21 - 52 %    MONOCYTES 5 3 - 10 %    EOSINOPHILS 0 0 - 5 %    BASOPHILS 0 0 - 2 %    ABS. NEUTROPHILS 10.3 (H) 1.8 - 8.0 K/UL    ABS.  LYMPHOCYTES 2.6 0.9 - 3.6 K/UL ABS. MONOCYTES 0.7 0.05 - 1.2 K/UL    ABS. EOSINOPHILS 0.0 0.0 - 0.4 K/UL    ABS. BASOPHILS 0.0 0.0 - 0.06 K/UL    DF AUTOMATED     PROTHROMBIN TIME + INR    Collection Time: 01/05/18 10:00 AM   Result Value Ref Range    Prothrombin time 42.3 (H) 11.5 - 15.2 sec    INR 4.6 (H) 0.8 - 1.2     EKG, 12 LEAD, INITIAL    Collection Time: 01/05/18 10:17 AM   Result Value Ref Range    Ventricular Rate 65 BPM    Atrial Rate 47 BPM    QRS Duration 88 ms    Q-T Interval 394 ms    QTC Calculation (Bezet) 409 ms    Calculated R Axis -35 degrees    Calculated T Axis 116 degrees    Diagnosis       Atrial fibrillation  Left axis deviation  Anterior infarct (cited on or before 08-FEB-2013)  Abnormal ECG  When compared with ECG of 10-DEC-2014 20:50,  No significant change was found     LIPASE    Collection Time: 01/05/18 10:29 AM   Result Value Ref Range    Lipase 47 (L) 73 - 393 U/L   CARDIAC PANEL,(CK, CKMB & TROPONIN)    Collection Time: 01/05/18 10:29 AM   Result Value Ref Range    CK 53 26 - 192 U/L    CK - MB <1.0 <3.6 ng/ml    CK-MB Index  0.0 - 4.0 %     CALCULATION NOT PERFORMED WHEN RESULT IS BELOW LINEAR LIMIT    Troponin-I, Qt. <0.02 0.00 - 2.36 NG/ML   METABOLIC PANEL, COMPREHENSIVE    Collection Time: 01/05/18 10:29 AM   Result Value Ref Range    Sodium 138 136 - 145 mmol/L    Potassium 5.3 3.5 - 5.5 mmol/L    Chloride 104 100 - 108 mmol/L    CO2 24 21 - 32 mmol/L    Anion gap 10 3.0 - 18 mmol/L    Glucose 164 (H) 74 - 99 mg/dL    BUN 20 (H) 7.0 - 18 MG/DL    Creatinine 1.22 0.6 - 1.3 MG/DL    BUN/Creatinine ratio 16 12 - 20      GFR est AA 51 (L) >60 ml/min/1.73m2    GFR est non-AA 42 (L) >60 ml/min/1.73m2    Calcium 9.7 8.5 - 10.1 MG/DL    Bilirubin, total 0.7 0.2 - 1.0 MG/DL    ALT (SGPT) 17 13 - 56 U/L    AST (SGOT) 14 (L) 15 - 37 U/L    Alk.  phosphatase 85 45 - 117 U/L    Protein, total 6.1 (L) 6.4 - 8.2 g/dL    Albumin 3.2 (L) 3.4 - 5.0 g/dL    Globulin 2.9 2.0 - 4.0 g/dL    A-G Ratio 1.1 0.8 - 1.7 Procedures/imaging: see electronic medical records for all procedures/Xrays and details which were not copied into this note but were reviewed prior to creation of Plan      CC:  Raafela Frye MD

## 2018-01-05 NOTE — PROGRESS NOTES
1413:  Assumed care for patient, received bedside report from Melanie Adam RN from ED. Bedside skin assessment performed. Patient has complaints of pain to bilat knee and abdomen, assessed and given PRN pain medication. Patient oriented to room, call bell, bathroom, television, and telephone. Whiteboard updated, bed at the lowest position with call bell within reach. Family members at bedside. 1725:  Patient in bed resting quietly with family member at bedside. No complaints of pain or discomfort at the time, bed at the lowest position with call bell within reach. 1944: Bedside and Verbal shift change report given to Robbie Fuller RN (oncoming nurse) by Dante Murguia RN   (offgoing nurse). Report included the following information SBAR, Kardex, ED Summary, Intake/Output, MAR, Recent Results, Med Rec Status and Cardiac Rhythm Afib.

## 2018-01-06 PROBLEM — R79.1 ELEVATED INR: Status: ACTIVE | Noted: 2018-01-06

## 2018-01-06 LAB
ANION GAP SERPL CALC-SCNC: 10 MMOL/L (ref 3–18)
APPEARANCE UR: CLEAR
BACTERIA URNS QL MICRO: ABNORMAL /HPF
BASOPHILS # BLD: 0 K/UL (ref 0–0.06)
BASOPHILS NFR BLD: 0 % (ref 0–2)
BILIRUB UR QL: NEGATIVE
BUN SERPL-MCNC: 19 MG/DL (ref 7–18)
BUN/CREAT SERPL: 21 (ref 12–20)
CALCIUM SERPL-MCNC: 9.3 MG/DL (ref 8.5–10.1)
CHLORIDE SERPL-SCNC: 107 MMOL/L (ref 100–108)
CO2 SERPL-SCNC: 24 MMOL/L (ref 21–32)
COLOR UR: YELLOW
CREAT SERPL-MCNC: 0.91 MG/DL (ref 0.6–1.3)
DIFFERENTIAL METHOD BLD: ABNORMAL
EOSINOPHIL # BLD: 0 K/UL (ref 0–0.4)
EOSINOPHIL NFR BLD: 1 % (ref 0–5)
EPITH CASTS URNS QL MICRO: ABNORMAL /LPF (ref 0–5)
ERYTHROCYTE [DISTWIDTH] IN BLOOD BY AUTOMATED COUNT: 14.1 % (ref 11.6–14.5)
GLUCOSE SERPL-MCNC: 103 MG/DL (ref 74–99)
GLUCOSE UR STRIP.AUTO-MCNC: NEGATIVE MG/DL
HCT VFR BLD AUTO: 30.2 % (ref 35–45)
HGB BLD-MCNC: 10.1 G/DL (ref 12–16)
HGB UR QL STRIP: NEGATIVE
INR PPP: 4.1 (ref 0.8–1.2)
KETONES UR QL STRIP.AUTO: ABNORMAL MG/DL
LEUKOCYTE ESTERASE UR QL STRIP.AUTO: ABNORMAL
LYMPHOCYTES # BLD: 1.7 K/UL (ref 0.9–3.6)
LYMPHOCYTES NFR BLD: 20 % (ref 21–52)
MAGNESIUM SERPL-MCNC: 2.2 MG/DL (ref 1.6–2.6)
MCH RBC QN AUTO: 31.1 PG (ref 24–34)
MCHC RBC AUTO-ENTMCNC: 33.4 G/DL (ref 31–37)
MCV RBC AUTO: 92.9 FL (ref 74–97)
MONOCYTES # BLD: 0.9 K/UL (ref 0.05–1.2)
MONOCYTES NFR BLD: 10 % (ref 3–10)
MUCOUS THREADS URNS QL MICRO: ABNORMAL /LPF
NEUTS SEG # BLD: 5.9 K/UL (ref 1.8–8)
NEUTS SEG NFR BLD: 69 % (ref 40–73)
NITRITE UR QL STRIP.AUTO: NEGATIVE
PH UR STRIP: 5 [PH] (ref 5–8)
PLATELET # BLD AUTO: 212 K/UL (ref 135–420)
PMV BLD AUTO: 11.5 FL (ref 9.2–11.8)
POTASSIUM SERPL-SCNC: 4.2 MMOL/L (ref 3.5–5.5)
PROT UR STRIP-MCNC: NEGATIVE MG/DL
PROTHROMBIN TIME: 38.9 SEC (ref 11.5–15.2)
RBC # BLD AUTO: 3.25 M/UL (ref 4.2–5.3)
RBC #/AREA URNS HPF: NEGATIVE /HPF (ref 0–5)
SODIUM SERPL-SCNC: 141 MMOL/L (ref 136–145)
SP GR UR REFRACTOMETRY: 1.02 (ref 1–1.03)
UROBILINOGEN UR QL STRIP.AUTO: 1 EU/DL (ref 0.2–1)
WBC # BLD AUTO: 8.5 K/UL (ref 4.6–13.2)
WBC URNS QL MICRO: ABNORMAL /HPF (ref 0–5)

## 2018-01-06 PROCEDURE — 80048 BASIC METABOLIC PNL TOTAL CA: CPT | Performed by: PHYSICIAN ASSISTANT

## 2018-01-06 PROCEDURE — 97161 PT EVAL LOW COMPLEX 20 MIN: CPT

## 2018-01-06 PROCEDURE — 36415 COLL VENOUS BLD VENIPUNCTURE: CPT | Performed by: PHYSICIAN ASSISTANT

## 2018-01-06 PROCEDURE — 74011250636 HC RX REV CODE- 250/636: Performed by: HOSPITALIST

## 2018-01-06 PROCEDURE — 83735 ASSAY OF MAGNESIUM: CPT | Performed by: PHYSICIAN ASSISTANT

## 2018-01-06 PROCEDURE — 85025 COMPLETE CBC W/AUTO DIFF WBC: CPT | Performed by: PHYSICIAN ASSISTANT

## 2018-01-06 PROCEDURE — 81001 URINALYSIS AUTO W/SCOPE: CPT | Performed by: EMERGENCY MEDICINE

## 2018-01-06 PROCEDURE — G8978 MOBILITY CURRENT STATUS: HCPCS

## 2018-01-06 PROCEDURE — 99218 HC RM OBSERVATION: CPT

## 2018-01-06 PROCEDURE — 74011250637 HC RX REV CODE- 250/637: Performed by: FAMILY MEDICINE

## 2018-01-06 PROCEDURE — 74011250637 HC RX REV CODE- 250/637: Performed by: PHYSICIAN ASSISTANT

## 2018-01-06 PROCEDURE — 97116 GAIT TRAINING THERAPY: CPT

## 2018-01-06 PROCEDURE — 85610 PROTHROMBIN TIME: CPT | Performed by: PHYSICIAN ASSISTANT

## 2018-01-06 PROCEDURE — G8979 MOBILITY GOAL STATUS: HCPCS

## 2018-01-06 RX ORDER — DOCUSATE SODIUM 100 MG/1
100 CAPSULE, LIQUID FILLED ORAL
Status: DISCONTINUED | OUTPATIENT
Start: 2018-01-06 | End: 2018-01-08 | Stop reason: HOSPADM

## 2018-01-06 RX ORDER — ATORVASTATIN CALCIUM 10 MG/1
10 TABLET, FILM COATED ORAL DAILY
Status: DISCONTINUED | OUTPATIENT
Start: 2018-01-07 | End: 2018-01-08 | Stop reason: HOSPADM

## 2018-01-06 RX ORDER — POLYETHYLENE GLYCOL 3350 17 G/17G
17 POWDER, FOR SOLUTION ORAL DAILY
Status: DISCONTINUED | OUTPATIENT
Start: 2018-01-06 | End: 2018-01-08 | Stop reason: HOSPADM

## 2018-01-06 RX ORDER — LOSARTAN POTASSIUM 25 MG/1
25 TABLET ORAL DAILY
Status: DISCONTINUED | OUTPATIENT
Start: 2018-01-07 | End: 2018-01-08 | Stop reason: HOSPADM

## 2018-01-06 RX ORDER — OMEPRAZOLE 20 MG/1
20 CAPSULE, DELAYED RELEASE ORAL DAILY
Status: DISCONTINUED | OUTPATIENT
Start: 2018-01-07 | End: 2018-01-08 | Stop reason: HOSPADM

## 2018-01-06 RX ADMIN — POLYETHYLENE GLYCOL 3350 17 G: 17 POWDER, FOR SOLUTION ORAL at 11:58

## 2018-01-06 RX ADMIN — SODIUM CHLORIDE 50 ML/HR: 900 INJECTION, SOLUTION INTRAVENOUS at 12:03

## 2018-01-06 RX ADMIN — HYDROCODONE BITARTRATE AND ACETAMINOPHEN 1 TABLET: 5; 325 TABLET ORAL at 08:22

## 2018-01-06 RX ADMIN — HYDROCODONE BITARTRATE AND ACETAMINOPHEN 1 TABLET: 5; 325 TABLET ORAL at 14:21

## 2018-01-06 NOTE — ROUTINE PROCESS
Bedside and Verbal shift change report given to [address] [place] [address] (oncoming nurse) by Sebastian Tamez RN (offgoing nurse). Report included the following information SBAR and Kardex.

## 2018-01-06 NOTE — CONSULTS
[address] [facility]    Tabby Tan  MR#: [id number]  : 1930  ACCOUNT #: [de-identified]   DATE OF SERVICE: 2018    REASON FOR CONSULTATION:  Left lower abdominal wall pain and discomfort. HISTORY OF PRESENT ILLNESS:  This is an 40-year-old female who relates that at least 1 week and, perhaps, as long as 2 weeks ago, she began to have left lower abdominal wall pain and discomfort. The patient stated that by moving, it would get worse. She stated that this morning, she got up out of bed, became dizzy, fell and hit her face. This was the reason she came over to the emergency room and also because the pain was getting progressively worse. This patient has a history of atrial fibrillation and is on Coumadin therapy. PAST MEDICAL HISTORY:  Includes history of arthritis, previous myocardial infarct, atrial fibrillation with coronary artery disease, history of hypercholesterolemia, hypertension, and modulating blood sugars. Urinary incontinence. PREVIOUS SURGERIES:  Include cholecystectomy. MEDICATIONS PRIOR TO ADMISSION:  Include Norco, triamterene, Prilosec, Reglan, valsartan, Coumadin 2.5 mg tablets, promethazine and Lipitor. ALLERGIES:  AMOXICILLIN. FAMILY HISTORY:  Not significant for breast or colon cancer. Significant for coronary artery disease. SOCIAL HISTORY:  The patient never was a smoker. Occasional alcohol consumption. REVIEW OF SYSTEMS:    CONSTITUTIONAL:  The patient admits to occasional dizziness. Denies blurred vision. No difficulty with swallowing, breathing, or chewing. NECK:  Denies significant pain or discomfort. HEART:  Denies symptoms that would suggest angina. ABDOMEN:  See HPI. GENITOURINARY:  Admits to urinary incontinence. EXTREMITIES:  Admits to arthritis having had a total knee replacement on the left side and some edema in the left leg.    NEUROLOGIC:  The patient experienced dizziness prior to admission. PHYSICAL EXAMINATION:   GENERAL:  Pleasant cooperative female looking her stated age. VITAL SIGNS:  At the time of this dictation included temperature 98.5, pulse 76, respiratory rate of 18, and blood pressure of 117/55. HEENT:  The patient has small bruises over the forehead but no significant tenderness associated. No significant ecchymosis associated with it. Otherwise, the head, eyes, ears, nose and throat are normal.    NECK:  Supple without lymphadenopathy or thyromegaly. CHEST:  Clear to auscultation and percussion. HEART:  Sounds without murmur, gallop, or rub. ABDOMEN:  Moderate to mild pain in the left lower abdomen. With deep palpation, it becomes more severe. It is palpated to the abdominal wall. It begins just below the umbilicus along the rectus muscle and extends just down to the inguinal ligament. It tends to take the course of the rectus muscle by palpation. It is not palpable on the right side. There is no significant pain or discomfort on the right side. GENITOURINARY:  Normal.    EXTREMITIES:  Full range of motion without neurologic deficit. NEUROLOGIC:  Exam is grossly intact. PERTINENT IMAGING STUDIES:  I have reviewed the patient's CT scan of the abdomen and pelvis. The CT scan findings are consistent with a large hematoma involving the left rectus muscle. IMPRESSION:   1. Left rectus muscle with a fair amount of bleeding that has occurred within the muscle. This is most likely to be chronic as she has been having this pain and discomfort for at least 1 week and, perhaps, longer. 2.  Her dizziness probably is related to component of anemia associated with the bleeding. 3.  History of atrial fibrillation. 4.  History of myocardial infarct. 5.  History of anticoagulation therapy with Coumadin. RECOMMENDATIONS AND PLANS:   At this point would consist of:  1. Bedrest with ice. 2.  Correct coagulopathy.    3.  The patients, most of the time, the bleeding in the rectus muscle from anticoagulant therapy resolves without surgical intervention. Surgery would be only indicated if there was extension of the collection. At this point, we plan to follow along with you and treat as necessary. The plan otherwise would be to follow and eventually a repeat CT scan would be indicated. Note that her hemoglobin now on admission is 11.1 with a hematocrit of 33. Certainly, they should be followed every 12 hours until stable. Again, followup on her PT INR in the a.m. We stand available to assist in her care.       MD SHMUEL Hernandez / Venancio Graham  D: 01/05/2018 19:25     T: 01/05/2018 20:16  JOB #: [id number]

## 2018-01-06 NOTE — CONSULTS
Pt admitted with pain LLQ secondary to rectus muscle hematoma. Surgery is not usually indicated and correction of coags, ice and tincture of time with documented stable H/Hs will usually produce good results.   #[id number]

## 2018-01-06 NOTE — ROUTINE PROCESS
Bedside and Verbal shift change report given to [phone] (oncoming nurse) by Nata Akbar RN (offgoing nurse). Report included the following information SBAR, Kardex, MAR and Recent Results.

## 2018-01-06 NOTE — PROGRESS NOTES
Problem: Mobility Impaired (Adult and Pediatric)  Goal: *Acute Goals and Plan of Care (Insert Text)  Physical Therapy Goals LT/ST  Initiated 1/6/2018 and to be accomplished within 3-5 day(s)  1. Patient will move from supine <> sit with S/mod I in prep for out of bed activity and change of position. 2.  Patient will perform sit<> stand with S/mod I with LRAD in prep for transfers/ambulation. 3.  Patient will transfer from bed <> chair with S/mod I with LRAD for time up in chair for completion of ADL activity. 4.  Patient will ambulate 150 feet with S/mod I with LRAD for improved functional mobility/safe discharge. .   5.  Patient will ascend/descend 3-5 stairs with handrail(s) with minimal assistance/contact guard assist for home re-entry as needed. Outcome: Progressing Towards Goal  physical Therapy EVALUATION    Patient: Maurice Fonseca (20 y.o. female)  Date: 1/6/2018  Primary Diagnosis: Abdominal wall hematoma  Precautions:Fall    ASSESSMENT :  Based on the objective data described below, the patient is a pleasant and cooperative 79 yo F who presents with decreased L hip flexor ROM/strength-limited by L LQ pain and decreased independence in functional mobility with regard to bed mobility, transfers, gait and overall activity tolerance. Patient reports pain 6/10 pre/post treatment. Pt required min/CGA to SBA for bed mobility tasks. Able to use log roll to transition to sit EOB with min A/additional time. Pt able to stand and participate in GT/RW/CGA 50ft using slow, antalgic gt pattern. Fatigued following same. Also reported mild dizziness upon sitting, which decreased with time but did not fully resolve. Pt returned to bed, educated in use of log roll to return to supine and left in R side lying with all needs in reach. Pt daughter present and nurse Christina notified.    Recommend HHPT for follow up physical therapy upon discharge to reach maximal level of independence/safety with functional mobility. Pt Education: pt educated in safety/technique during functional mobility tasks     Patient will benefit from skilled intervention to address the above impairments. Patients rehabilitation potential is considered to be Good  Factors which may influence rehabilitation potential include:   []         None noted  []         Mental ability/status  [x]         Medical condition  []         Home/family situation and support systems  []         Safety awareness  []         Pain tolerance/management  []         Other:      PLAN :  Recommendations and Planned Interventions:  [x]           Bed Mobility Training             []    Neuromuscular Re-Education  [x]           Transfer Training                   []    Orthotic/Prosthetic Training  [x]           Gait Training                          []    Modalities  [x]           Therapeutic Exercises          []    Edema Management/Control  [x]           Therapeutic Activities            [x]    Patient and Family Training/Education  []           Other (comment):    Frequency/Duration: Patient will be followed by physical therapy 1-2 times per day to address goals. Discharge Recommendations: Home Health  Further Equipment Recommendations for Discharge: rolling walker     SUBJECTIVE:   Patient stated I don't know, what does PT mean.     OBJECTIVE DATA SUMMARY:     Past Medical History:   Diagnosis Date    Arthritis     [address]rial [address] [address]     CAD (coronary artery disease)     Edema     Gastrointestinal disorder     High cholesterol     Hypertension     Hypoglycemia     Hypoglycemia     IBS (irritable bowel syndrome)     MI, old      Past Surgical History:   Procedure Laterality Date    HX CHOLECYSTECTOMY      HX KNEE REPLACEMENT      HX ORTHOPAEDIC      Left TKR     Barriers to Learning/Limitations: None  Compensate with: N/A  Prior Level of Function/Home Situation: amb with no device and independently prior to recent fall which occurred PTA  Home Situation  Home Environment: Private residence  # Steps to Enter: 3  Rails to Enter: Yes  Hand Rails : Right  One/Two Story Residence: One story  Living Alone: No  Support Systems: Child(nasreen), Family member(s) (pt son and his daughter live with pt)  Patient Expects to be Discharged to[de-identified] Private residence  Current DME Used/Available at Home: Cane, straight (amb w/o AD. h/o fall 1/5/18 getting up from bed)  Tub or Shower Type: Tub/Shower combination  Critical Behavior:  Neurologic State: Alert; Appropriate for age  Orientation Level: Oriented X4  Cognition: Follows commands; Appropriate safety awareness; Appropriate decision making; Appropriate for age attention/concentration  Psychosocial  Patient Behaviors: Calm; Cooperative  Family  Behaviors: Calm;Supportive  Purposeful Interaction: Yes  Pt Identified Daily Priority: Clinical issues (comment)  Caritas Process: Establish trust;Nurture loving kindness  Caring Interventions: Reassure  Reassure: Informing  Therapeutic Modalities: Intentional therapeutic touch  Skin Condition/Temp: Dry;Warm  Family  Behaviors: Calm;Supportive  Skin Integrity: Intact  Skin Integumentary  Skin Color: Ecchymosis (comment) (to right forearm and left hand )  Skin Condition/Temp: Dry;Warm  Skin Integrity: Intact  Turgor: Epidermis thin w/ loss of subcut tissue  Hair Growth: Present  Varicosities: Absent  Strength:    Strength: Generally decreased, functional, limited R hip flex due to pain L lower abdomen  Tone & Sensation:   Tone: Normal  Sensation: Intact  Range Of Motion:  AROM: Within functional limits (except L hip flex d/t L LQ pain)  Functional Mobility:  Bed Mobility:  Rolling: Supervision; Additional time (mild dizziness)  Supine to Sit: Stand-by asssistance;Contact guard assistance (vis log roll)  Sit to Supine: Minimum assistance; Additional time; Other (comment) (vc to log roll)  Transfers:  Sit to Stand: Contact guard assistance  Stand to Sit: Contact guard assistance; Other (comment) (vc for safety and technique)  Balance:   Sitting: Intact  Ambulation/Gait Training:  Distance (ft): 50 Feet (ft)  Assistive Device: Gait belt;Walker, rolling  Ambulation - Level of Assistance: Contact guard assistance  Gait Abnormalities: Antalgic;Decreased step clearance  Speed/Tangela: Slow  Step Length: Right shortened;Left shortened  Interventions: Safety awareness training;Verbal cues  Pain:  Pain Scale 1: Numeric (0 - 10)  Pain Intensity 1: 6  Pain Location 1: Abdomen  Pain Orientation 1: Left  Pain Description 1: Sore  Pain Intervention(s) 1: Medication (see MAR)  Activity Tolerance:   Fair   Please refer to the flowsheet for vital signs taken during this treatment. After treatment:   []         Patient left in no apparent distress sitting up in chair  [x]         Patient left in no apparent distress in bed in R side lying  [x]         Call bell left within reach  [x]         Nursing notified  [x]         Caregiver present  []         Bed alarm activated    COMMUNICATION/EDUCATION:   [x]         Fall prevention education was provided and the patient/caregiver indicated understanding. [x]         Patient/family have participated as able in goal setting and plan of care. [x]         Patient/family agree to work toward stated goals and plan of care. []         Patient understands intent and goals of therapy, but is neutral about his/her participation. []         Patient is unable to participate in goal setting and plan of care.     Eval Complexity: History: HIGH Complexity :3+ comorbidities / personal factors will impact the outcome/ POC Exam:LOW Complexity : 1-2 Standardized tests and measures addressing body structure, function, activity limitation and / or participation in recreation  Presentation: LOW Complexity : Stable, uncomplicated  Clinical Decision Making:Low Complexity amb >30ft with RW/CGA Overall Complexity:LOW     G-Codes (GP)  Mobility  [id number] Current  CJ= 20-39%   Goal  CI= 1-19%  The severity rating is based on the functional mobility assessment.     Thank you for this referral.  Tre Waldrop, PT   Time Calculation: 33 mins

## 2018-01-06 NOTE — PROGRESS NOTES
Cardiology Progress Note      1/6/2018 8:22 AM    Admit Date: 1/5/2018    Admit Diagnosis: Abdominal wall hematoma      Subjective:     Ismael Castillo denies chest pain, chest pressure/discomfort, dyspnea. Visit Vitals    /75 (BP 1 Location: Left arm, BP Patient Position: At rest)    Pulse (!) 104    Temp 98 °F (36.7 °C)    Resp 16    Wt 71.3 kg (157 lb 3 oz)    SpO2 98%    BMI 28.75 kg/m2     Current Facility-Administered Medications   Medication Dose Route Frequency    acetaminophen (TYLENOL) tablet 650 mg  650 mg Oral Q4H PRN    HYDROcodone-acetaminophen (NORCO) 5-325 mg per tablet 1 Tab  1 Tab Oral Q6H PRN    ondansetron (ZOFRAN) injection 4 mg  4 mg IntraVENous Q4H PRN    0.9% sodium chloride infusion  50 mL/hr IntraVENous CONTINUOUS         Objective:      Physical Exam:  Visit Vitals    /75 (BP 1 Location: Left arm, BP Patient Position: At rest)    Pulse (!) 104    Temp 98 °F (36.7 °C)    Resp 16    Wt 71.3 kg (157 lb 3 oz)    SpO2 98%    BMI 28.75 kg/m2     General Appearance:  Well developed, well nourished,alert and oriented x 3, and individual in no acute distress. Ears/Nose/Mouth/Throat:   Hearing grossly normal.         Neck: Supple. Chest:   Lungs clear to auscultation bilaterally. Cardiovascular:  Irregular rate and rhythm, S1, S2 normal, no murmur. Abdomen:   Soft, non-tender, bowel sounds are active. Extremities: No edema bilaterally. Skin: Warm and dry.                Data Review:   Labs:    Recent Results (from the past 24 hour(s))   CBC WITH AUTOMATED DIFF    Collection Time: 01/05/18  9:24 AM   Result Value Ref Range    WBC 13.6 (H) 4.6 - 13.2 K/uL    RBC 3.64 (L) 4.20 - 5.30 M/uL    HGB 11.1 (L) 12.0 - 16.0 g/dL    HCT 33.8 (L) 35.0 - 45.0 %    MCV 92.9 74.0 - 97.0 FL    MCH 30.5 24.0 - 34.0 PG    MCHC 32.8 31.0 - 37.0 g/dL    RDW 13.7 11.6 - 14.5 %    PLATELET 396 110 - 890 K/uL    MPV 10.7 9.2 - 11.8 FL    NEUTROPHILS 76 (H) 40 - 73 % LYMPHOCYTES 19 (L) 21 - 52 %    MONOCYTES 5 3 - 10 %    EOSINOPHILS 0 0 - 5 %    BASOPHILS 0 0 - 2 %    ABS. NEUTROPHILS 10.3 (H) 1.8 - 8.0 K/UL    ABS. LYMPHOCYTES 2.6 0.9 - 3.6 K/UL    ABS. MONOCYTES 0.7 0.05 - 1.2 K/UL    ABS. EOSINOPHILS 0.0 0.0 - 0.4 K/UL    ABS.  BASOPHILS 0.0 0.0 - 0.06 K/UL    DF AUTOMATED     PROTHROMBIN TIME + INR    Collection Time: 01/05/18 10:00 AM   Result Value Ref Range    Prothrombin time 42.3 (H) 11.5 - 15.2 sec    INR 4.6 (H) 0.8 - 1.2     EKG, 12 LEAD, INITIAL    Collection Time: 01/05/18 10:17 AM   Result Value Ref Range    Ventricular Rate 65 BPM    Atrial Rate 47 BPM    QRS Duration 88 ms    Q-T Interval 394 ms    QTC Calculation (Bezet) 409 ms    Calculated R Axis -35 degrees    Calculated T Axis 116 degrees    Diagnosis       Atrial fibrillation  Left axis deviation  Anteroseptal infarct , age undetermined  Nonspecific ST and T wave abnormality  Abnormal ECG  Confirmed by Karey Alvarenga MD, Jerry Hillman ([id number]) on 1/5/2018 6:04:07 PM     LIPASE    Collection Time: 01/05/18 10:29 AM   Result Value Ref Range    Lipase 47 (L) 73 - 393 U/L   CARDIAC PANEL,(CK, CKMB & TROPONIN)    Collection Time: 01/05/18 10:29 AM   Result Value Ref Range    CK 53 26 - 192 U/L    CK - MB <1.0 <3.6 ng/ml    CK-MB Index  0.0 - 4.0 %     CALCULATION NOT PERFORMED WHEN RESULT IS BELOW LINEAR LIMIT    Troponin-I, Qt. <0.02 0.00 - 5.04 NG/ML   METABOLIC PANEL, COMPREHENSIVE    Collection Time: 01/05/18 10:29 AM   Result Value Ref Range    Sodium 138 136 - 145 mmol/L    Potassium 5.3 3.5 - 5.5 mmol/L    Chloride 104 100 - 108 mmol/L    CO2 24 21 - 32 mmol/L    Anion gap 10 3.0 - 18 mmol/L    Glucose 164 (H) 74 - 99 mg/dL    BUN 20 (H) 7.0 - 18 MG/DL    Creatinine 1.22 0.6 - 1.3 MG/DL    BUN/Creatinine ratio 16 12 - 20      GFR est AA 51 (L) >60 ml/min/1.73m2    GFR est non-AA 42 (L) >60 ml/min/1.73m2    Calcium 9.7 8.5 - 10.1 MG/DL    Bilirubin, total 0.7 0.2 - 1.0 MG/DL    ALT (SGPT) 17 13 - 56 U/L    AST (SGOT) 14 (L) 15 - 37 U/L    Alk.  phosphatase 85 45 - 117 U/L    Protein, total 6.1 (L) 6.4 - 8.2 g/dL    Albumin 3.2 (L) 3.4 - 5.0 g/dL    Globulin 2.9 2.0 - 4.0 g/dL    A-G Ratio 1.1 0.8 - 1.7     URINALYSIS W/ RFLX MICROSCOPIC    Collection Time: 01/06/18  1:00 AM   Result Value Ref Range    Color YELLOW      Appearance CLEAR      Specific gravity 1.025 1.005 - 1.030      pH (UA) 5.0 5.0 - 8.0      Protein NEGATIVE  NEG mg/dL    Glucose NEGATIVE  NEG mg/dL    Ketone TRACE (A) NEG mg/dL    Bilirubin NEGATIVE  NEG      Blood NEGATIVE  NEG      Urobilinogen 1.0 0.2 - 1.0 EU/dL    Nitrites NEGATIVE  NEG      Leukocyte Esterase TRACE (A) NEG     URINE MICROSCOPIC ONLY    Collection Time: 01/06/18  1:00 AM   Result Value Ref Range    WBC 2 to 5 0 - 5 /hpf    RBC NEGATIVE  0 - 5 /hpf    Epithelial cells 1+ 0 - 5 /lpf    Bacteria 1+ (A) NEG /hpf    Mucus FEW (A) NEG /lpf   METABOLIC PANEL, BASIC    Collection Time: 01/06/18  2:08 AM   Result Value Ref Range    Sodium 141 136 - 145 mmol/L    Potassium 4.2 3.5 - 5.5 mmol/L    Chloride 107 100 - 108 mmol/L    CO2 24 21 - 32 mmol/L    Anion gap 10 3.0 - 18 mmol/L    Glucose 103 (H) 74 - 99 mg/dL    BUN 19 (H) 7.0 - 18 MG/DL    Creatinine 0.91 0.6 - 1.3 MG/DL    BUN/Creatinine ratio 21 (H) 12 - 20      GFR est AA >60 >60 ml/min/1.73m2    GFR est non-AA 58 (L) >60 ml/min/1.73m2    Calcium 9.3 8.5 - 10.1 MG/DL   MAGNESIUM    Collection Time: 01/06/18  2:08 AM   Result Value Ref Range    Magnesium 2.2 1.6 - 2.6 mg/dL   CBC WITH AUTOMATED DIFF    Collection Time: 01/06/18  2:08 AM   Result Value Ref Range    WBC 8.5 4.6 - 13.2 K/uL    RBC 3.25 (L) 4.20 - 5.30 M/uL    HGB 10.1 (L) 12.0 - 16.0 g/dL    HCT 30.2 (L) 35.0 - 45.0 %    MCV 92.9 74.0 - 97.0 FL    MCH 31.1 24.0 - 34.0 PG    MCHC 33.4 31.0 - 37.0 g/dL    RDW 14.1 11.6 - 14.5 %    PLATELET 247 226 - 055 K/uL    MPV 11.5 9.2 - 11.8 FL    NEUTROPHILS 69 40 - 73 %    LYMPHOCYTES 20 (L) 21 - 52 %    MONOCYTES 10 3 - 10 %    EOSINOPHILS 1 0 - 5 %    BASOPHILS 0 0 - 2 %    ABS. NEUTROPHILS 5.9 1.8 - 8.0 K/UL    ABS. LYMPHOCYTES 1.7 0.9 - 3.6 K/UL    ABS. MONOCYTES 0.9 0.05 - 1.2 K/UL    ABS. EOSINOPHILS 0.0 0.0 - 0.4 K/UL    ABS. BASOPHILS 0.0 0.0 - 0.06 K/UL    DF AUTOMATED     PROTHROMBIN TIME + INR    Collection Time: 01/06/18  2:08 AM   Result Value Ref Range    Prothrombin time 38.9 (H) 11.5 - 15.2 sec    INR 4.1 (H) 0.8 - 1.2         Telemetry: AFIB      Assessment:     Principal Problem:    Abdominal wall hematoma (1/5/2018)    Active Problems:    Atrial fibrillation ([address] (5/25/2012)      CAD (coronary artery disease) (1/5/2018)      Hypercholesterolemia (1/5/2018)      Hypertension (1/5/2018)      Hx of myocardial infarction (1/5/2018)        Plan:     About the same. Warfarin has been discontinued. She is stable from the cardiac standpoint.     Federico Joseph MD

## 2018-01-06 NOTE — PROGRESS NOTES
Chart Reviewed. Noted patient admitted to the hospital for further medical management. Care Management to follow up with patient and/or family for transition of care needs prn. Spoke with pt regarding observation status, opportunity for questions provided. Care Management Interventions  PCP Verified by CM:  Yes  Transition of Care Consult (CM Consult): Discharge Planning

## 2018-01-06 NOTE — PROGRESS NOTES
5144  Assumed care of pt at this time. Assessment complete. Pt alert and oriented x 4 resting in bed. Denies SOB and chest pain. Pt lungs clear bilaterally. Cap refill  less than 3 seconds. Pt denies numbness and tingling to all extremities. Stated pain 7/10 to LLQ abdomen. [place] to be given PRN to control pain  Pt has 20 G IV to right hand. No on anticoag at this time due to INR 4.1 Ice pack to be applied to abdomen. Call light and possessions within reach. Bed in low position. Will continue to monitor. 0838  Pt stated pain is 7/10. Medicated with 5mg of norco    1015  In to round on pt with Dr Kari Barros. Dr Kari Barros made aware that pt has INR of 4.1, no BM since 1/4/18, and pt not eating and drinking much. Miralax ordered and encourage IS. Brittani Dies Possible d/c Monday if INR decreases. Dr Kari Barros will follow up with Dr Gerry Barker pt cardiologist.       4680  Nurse attempted to get IV access. Unsuccessful at this time.  Will attempt later

## 2018-01-06 NOTE — PROGRESS NOTES
Hospitalist Progress Note    Patient: Naomi Dunlap MRN: [id number]  CSN: [id number]    YOB: 1930  Age: 80 y.o. Sex: female    DOA: 1/5/2018 LOS:  LOS: 0 days            Assessment/Plan     Principal Problem:    Abdominal wall hematoma (1/5/2018)    Active Problems:    Atrial fibrillation ([address] (5/25/2012)      CAD (coronary artery disease) (1/5/2018)      Hypercholesterolemia (1/5/2018)      Hypertension (1/5/2018)      Hx of myocardial infarction (1/5/2018)      Elevated INR (1/6/2018)      Abdominal Wall Hematoma: Persistent. 2nd to long term coumadin use and elevated INR. Discussed the case with Dr. Moose Garcia. CT: Moderately large left-sided rectus sheath hematoma with small amount of  contiguous intrapelvic, extraperitoneal blood products. Atrial Fibrillation, Chronic: rate controlled. Elevated INR: Off the coumadin, will f/u INR. Chronic Anticoagulation on Coumadin stop coumadin 2nd to hematoma. Likely needs to be switched to ASA given her age and hx of fall. Will discussed the case with Dr. Alfred Regalado. CAD: stable. On ARB. Hypertension: Controlled. On ARB. Hx of MI    R knee pain: 2nd to DJD and right knee contusion. Tylenol prn for pain    Remain hospital care    Anticipate to home in 2 days. CC:  Abdominal wall hematoma, A. Fib, CAD, HTN, R. Knee pain          Subjective:     Pt was seen and examined with the nurse in the morning round. C/O pain to abdominal, 8/10 with no radiation. No BM yet. Review of systems  General: No fevers or chills. Cardiovascular: No chest pain or pressure. No palpitations. Pulmonary: No cough, SOB  Gastrointestinal: No nausea, vomiting. Objective:      Visit Vitals    /55 (BP 1 Location: Left arm, BP Patient Position: At rest)    Pulse 67    Temp 97.9 °F (36.6 °C)    Resp 16    Wt 71.3 kg (157 lb 3 oz)    SpO2 97%    BMI 28.75 kg/m2       Physical Exam:    Gen: NAD, non-toxic. Heent:  MMM, NC, AT.   Cor: s1s2 RRR. No MRG. PMI mid 5th intercostal space. Resp:  CTA b/l. No w/r/r. Nml effort and diaphragmatic excursion. Abd:  + hematoma to abdominal area. TTP. ND.  BS positive. Ext: No edema or cyanosis. Intake and Output:  Current Shift:  01/06 0701 - 01/06 1900  In: 220 [P.O.:220]  Out: 400 [Urine:400]  Last three shifts:  01/04 1901 - 01/06 0700  In: 800 [P.O.:100; I.V.:700]  Out: 300 [Urine:300]    Labs: Results:       Chemistry Recent Labs      01/06/18   0208  01/05/18   1029   GLU  103*  164*   NA  141  138   K  4.2  5.3   CL  107  104   CO2  24  24   BUN  19*  20*   CREA  0.91  1.22   CA  9.3  9.7   AGAP  10  10   BUCR  21*  16   AP   --   85   TP   --   6.1*   ALB   --   3.2*   GLOB   --   2.9   AGRAT   --   1.1      CBC w/Diff Recent Labs      01/06/18   0208  01/05/18   0924   WBC  8.5  13.6*   RBC  3.25*  3.64*   HGB  10.1*  11.1*   HCT  30.2*  33.8*   PLT  212  273   GRANS  69  76*   LYMPH  20*  19*   EOS  1  0      Cardiac Enzymes Recent Labs      01/05/18   1029   CPK  53   CKND1  CALCULATION NOT PERFORMED WHEN RESULT IS BELOW LINEAR LIMIT      Coagulation Recent Labs      01/06/18   0208  01/05/18   1000   PTP  38.9*  42.3*   INR  4.1*  4.6*       Lipid Panel Lab Results   Component Value Date/Time    Cholesterol, total 147 04/12/2013 09:30 PM    HDL Cholesterol 56 04/12/2013 09:30 PM    LDL, calculated 75.6 04/12/2013 09:30 PM    VLDL, calculated 15.4 04/12/2013 09:30 PM    Triglyceride 77 04/12/2013 09:30 PM    CHOL/HDL Ratio 2.6 04/12/2013 09:30 PM      BNP No results for input(s): BNPP in the last 72 hours.    Liver Enzymes Recent Labs      01/05/18   1029   TP  6.1*   ALB  3.2*   AP  85   SGOT  14*      Thyroid Studies Lab Results   Component Value Date/Time    TSH 5.51 12/10/2014 08:53 PM        Procedures/imaging: see electronic medical records for all procedures/Xrays and details which were not copied into this note but were reviewed prior to creation of Plan      Medications Reviewed  Yasir Mccoy MD

## 2018-01-06 NOTE — CONSULTS
Cardiolology  Inpatient Consult      Patient: Yfn Wheeler               Sex: female          DOA: 1/5/2018       YOB: 1930      Age:  80 y.o.        LOS:  LOS: 0 days      Yfn Wheeler is a 80 y.o. female admitted for Abdominal wall hematoma      Recommendations:  · Hold anticoagulants as you are doing  · Follow  · Stop antithrombin agents altogether due to risk benefit assessment      Impression:  · Chronic AFIB  · Hematoma of abominal wall  · Other problems as enumerated below    Patient Active Problem List    Diagnosis Date Noted    Abdominal wall hematoma 01/05/2018    CAD (coronary artery disease) 01/05/2018    Hypercholesterolemia 01/05/2018    Hypertension 01/05/2018    Hx of myocardial infarction 01/05/2018    Encephalopathy, unspecified 04/12/2013    Abdominal pain, other specified site 05/25/2012    Atrial fibrillation ([facility] [address] 05/25/2012    Constipation 05/25/2012      Amos Queen MD  Past Medical History:   Diagnosis Date    Arthritis     Atrial fibrillation ([address]     CAD (coronary artery disease)     Edema     Gastrointestinal disorder     High cholesterol     Hypertension     Hypoglycemia     Hypoglycemia     IBS (irritable bowel syndrome)     MI, old       Past Surgical History:   Procedure Laterality Date    HX CHOLECYSTECTOMY      HX KNEE REPLACEMENT      HX ORTHOPAEDIC      Left TKR     Allergies   Allergen Reactions    Amoxicillin Rash      History reviewed. No pertinent family history. Current Facility-Administered Medications   Medication Dose Route Frequency    acetaminophen (TYLENOL) tablet 650 mg  650 mg Oral Q4H PRN    HYDROcodone-acetaminophen (NORCO) 5-325 mg per tablet 1 Tab  1 Tab Oral Q6H PRN    ondansetron (ZOFRAN) injection 4 mg  4 mg IntraVENous Q4H PRN    0.9% sodium chloride infusion  50 mL/hr IntraVENous CONTINUOUS         Review of Symptoms:  Review of Systems  Gen: No fever, chills, malaise, weight loss/gain.    Heent: No headache, rhinorrhea, epistaxis, ear pain, hearing loss, sinus pain, neck pain/stiffness, sore throat. Heart: No chest pain, palpitations, NEIL, pnd, or orthopnea. Resp: No cough, hemoptysis, wheezing and shortness of breath. GI: No nausea, vomiting, diarrhea, constipation, melena or hematochezia. Reports abdominal pain  : No urinary obstruction, dysuria or hematuria. Derm: No rash, new skin lesion or pruritis. Musc/skeletal: Reports right knee pain  Vasc: No edema, cyanosis or claudication. Endo: No heat/cold intolerance, no polyuria,polydipsia or polyphagia. Neuro: No unilateral weakness, numbness, tingling. No seizures. Heme: No easy bruising or bleeding. Subjective:  Thiago Julien is a 80 y.o. female who has a history of HTN, atrial fibrillation, CAD, hypercholesterolemia, hx of MI, episodes of hypoglycemia, and PSHx of cholecystectomy and knee replacement presents to the ED today with complaints of abdominal pain. She states the pain has actually improved over the last day or so, but has been present since around Barbara. The patient states that this morning she awoke and went to use the lavatory and one way or another, she does not recall, ended up on the ground. She denies LOC, did not hit her head. She states her son, who lives with her, assisted her up off of the floor, and the patient then called EMS to transport her to the ED. In the ED, she was found to have an abdominal wall hematoma. The patient admits to occasional drinking, denies smoking. Complaints of abdominal pain and right knee pain. Denies any vomiting, constipation, diarrhea, chills, SOB. Her AFIB rate is controlled. She has been on warfarin for AFIB indications. Today her INR was elevated above therapeutic range. .  Cardiac risk factors: extant.       Physical Exam    Visit Vitals    /62 (BP 1 Location: Left arm, BP Patient Position: At rest)    Pulse 76    Temp 98 °F (36.7 °C)    Resp 14  SpO2 98%       General Appearance:  Well developed, well nourished,alert and oriented x 3, and individual in no acute distress. Ears/Nose/Mouth/Throat:   Hearing grossly normal.         Neck: Supple. Chest:   Lungs clear to auscultation bilaterally. Cardiovascular:  Irregular rate and rhythm, S1, S2 normal, no murmur. Abdomen:   Soft, non-tender, bowel sounds are active. Extremities: No edema bilaterally. Skin: Warm and dry. Cardiographics    Telemetry: AFIB  ECG: AFIB, no acute changes  Echocardiogram: Not done    Recent radiology, intake/output and wt reviewed    Labs:   Recent Results (from the past 48 hour(s))   CBC WITH AUTOMATED DIFF    Collection Time: 01/05/18  9:24 AM   Result Value Ref Range    WBC 13.6 (H) 4.6 - 13.2 K/uL    RBC 3.64 (L) 4.20 - 5.30 M/uL    HGB 11.1 (L) 12.0 - 16.0 g/dL    HCT 33.8 (L) 35.0 - 45.0 %    MCV 92.9 74.0 - 97.0 FL    MCH 30.5 24.0 - 34.0 PG    MCHC 32.8 31.0 - 37.0 g/dL    RDW 13.7 11.6 - 14.5 %    PLATELET 913 698 - 297 K/uL    MPV 10.7 9.2 - 11.8 FL    NEUTROPHILS 76 (H) 40 - 73 %    LYMPHOCYTES 19 (L) 21 - 52 %    MONOCYTES 5 3 - 10 %    EOSINOPHILS 0 0 - 5 %    BASOPHILS 0 0 - 2 %    ABS. NEUTROPHILS 10.3 (H) 1.8 - 8.0 K/UL    ABS. LYMPHOCYTES 2.6 0.9 - 3.6 K/UL    ABS. MONOCYTES 0.7 0.05 - 1.2 K/UL    ABS. EOSINOPHILS 0.0 0.0 - 0.4 K/UL    ABS.  BASOPHILS 0.0 0.0 - 0.06 K/UL    DF AUTOMATED     PROTHROMBIN TIME + INR    Collection Time: 01/05/18 10:00 AM   Result Value Ref Range    Prothrombin time 42.3 (H) 11.5 - 15.2 sec    INR 4.6 (H) 0.8 - 1.2     EKG, 12 LEAD, INITIAL    Collection Time: 01/05/18 10:17 AM   Result Value Ref Range    Ventricular Rate 65 BPM    Atrial Rate 47 BPM    QRS Duration 88 ms    Q-T Interval 394 ms    QTC Calculation (Bezet) 409 ms    Calculated R Axis -35 degrees    Calculated T Axis 116 degrees    Diagnosis       Atrial fibrillation  Left axis deviation  Anteroseptal infarct , age undetermined  Nonspecific ST and T wave abnormality  Abnormal ECG  Confirmed by Karey Alvarenga MD, Jerry Hillman ([id number]) on 1/5/2018 6:04:07 PM     LIPASE    Collection Time: 01/05/18 10:29 AM   Result Value Ref Range    Lipase 47 (L) 73 - 393 U/L   CARDIAC PANEL,(CK, CKMB & TROPONIN)    Collection Time: 01/05/18 10:29 AM   Result Value Ref Range    CK 53 26 - 192 U/L    CK - MB <1.0 <3.6 ng/ml    CK-MB Index  0.0 - 4.0 %     CALCULATION NOT PERFORMED WHEN RESULT IS BELOW LINEAR LIMIT    Troponin-I, Qt. <0.02 0.00 - 6.92 NG/ML   METABOLIC PANEL, COMPREHENSIVE    Collection Time: 01/05/18 10:29 AM   Result Value Ref Range    Sodium 138 136 - 145 mmol/L    Potassium 5.3 3.5 - 5.5 mmol/L    Chloride 104 100 - 108 mmol/L    CO2 24 21 - 32 mmol/L    Anion gap 10 3.0 - 18 mmol/L    Glucose 164 (H) 74 - 99 mg/dL    BUN 20 (H) 7.0 - 18 MG/DL    Creatinine 1.22 0.6 - 1.3 MG/DL    BUN/Creatinine ratio 16 12 - 20      GFR est AA 51 (L) >60 ml/min/1.73m2    GFR est non-AA 42 (L) >60 ml/min/1.73m2    Calcium 9.7 8.5 - 10.1 MG/DL    Bilirubin, total 0.7 0.2 - 1.0 MG/DL    ALT (SGPT) 17 13 - 56 U/L    AST (SGOT) 14 (L) 15 - 37 U/L    Alk.  phosphatase 85 45 - 117 U/L    Protein, total 6.1 (L) 6.4 - 8.2 g/dL    Albumin 3.2 (L) 3.4 - 5.0 g/dL    Globulin 2.9 2.0 - 4.0 g/dL    A-G Ratio 1.1 0.8 - 1.7             John Lopez MD

## 2018-01-06 NOTE — PROGRESS NOTES
Pt did okay over night and pain has not increased. Exam would suggest no increase in size of hematoma. H/H is stable   Regular diet. Continue ice  ?  D/c\"ing Coumadin

## 2018-01-07 LAB
ANION GAP SERPL CALC-SCNC: 6 MMOL/L (ref 3–18)
BASOPHILS # BLD: 0 K/UL (ref 0–0.06)
BASOPHILS NFR BLD: 0 % (ref 0–2)
BUN SERPL-MCNC: 15 MG/DL (ref 7–18)
BUN/CREAT SERPL: 17 (ref 12–20)
CALCIUM SERPL-MCNC: 8.9 MG/DL (ref 8.5–10.1)
CHLORIDE SERPL-SCNC: 110 MMOL/L (ref 100–108)
CO2 SERPL-SCNC: 26 MMOL/L (ref 21–32)
CREAT SERPL-MCNC: 0.89 MG/DL (ref 0.6–1.3)
DIFFERENTIAL METHOD BLD: ABNORMAL
EOSINOPHIL # BLD: 0.1 K/UL (ref 0–0.4)
EOSINOPHIL NFR BLD: 1 % (ref 0–5)
ERYTHROCYTE [DISTWIDTH] IN BLOOD BY AUTOMATED COUNT: 14 % (ref 11.6–14.5)
GLUCOSE SERPL-MCNC: 99 MG/DL (ref 74–99)
HCT VFR BLD AUTO: 23.1 % (ref 35–45)
HGB BLD-MCNC: 7.6 G/DL (ref 12–16)
INR PPP: 3.6 (ref 0.8–1.2)
LYMPHOCYTES # BLD: 1.9 K/UL (ref 0.9–3.6)
LYMPHOCYTES NFR BLD: 31 % (ref 21–52)
MAGNESIUM SERPL-MCNC: 1.9 MG/DL (ref 1.6–2.6)
MCH RBC QN AUTO: 30.6 PG (ref 24–34)
MCHC RBC AUTO-ENTMCNC: 32.9 G/DL (ref 31–37)
MCV RBC AUTO: 93.1 FL (ref 74–97)
MONOCYTES # BLD: 0.6 K/UL (ref 0.05–1.2)
MONOCYTES NFR BLD: 10 % (ref 3–10)
NEUTS SEG # BLD: 3.5 K/UL (ref 1.8–8)
NEUTS SEG NFR BLD: 58 % (ref 40–73)
PLATELET # BLD AUTO: 189 K/UL (ref 135–420)
PMV BLD AUTO: 10.2 FL (ref 9.2–11.8)
POTASSIUM SERPL-SCNC: 4.5 MMOL/L (ref 3.5–5.5)
PROTHROMBIN TIME: 35.2 SEC (ref 11.5–15.2)
RBC # BLD AUTO: 2.48 M/UL (ref 4.2–5.3)
SODIUM SERPL-SCNC: 142 MMOL/L (ref 136–145)
WBC # BLD AUTO: 6.1 K/UL (ref 4.6–13.2)

## 2018-01-07 PROCEDURE — 85610 PROTHROMBIN TIME: CPT | Performed by: PHYSICIAN ASSISTANT

## 2018-01-07 PROCEDURE — 80048 BASIC METABOLIC PNL TOTAL CA: CPT | Performed by: PHYSICIAN ASSISTANT

## 2018-01-07 PROCEDURE — 74011250637 HC RX REV CODE- 250/637: Performed by: FAMILY MEDICINE

## 2018-01-07 PROCEDURE — 99218 HC RM OBSERVATION: CPT

## 2018-01-07 PROCEDURE — 65660000000 HC RM CCU STEPDOWN

## 2018-01-07 PROCEDURE — 83735 ASSAY OF MAGNESIUM: CPT | Performed by: PHYSICIAN ASSISTANT

## 2018-01-07 PROCEDURE — 36415 COLL VENOUS BLD VENIPUNCTURE: CPT | Performed by: PHYSICIAN ASSISTANT

## 2018-01-07 PROCEDURE — 74011250637 HC RX REV CODE- 250/637: Performed by: PHYSICIAN ASSISTANT

## 2018-01-07 PROCEDURE — 97116 GAIT TRAINING THERAPY: CPT

## 2018-01-07 PROCEDURE — 85025 COMPLETE CBC W/AUTO DIFF WBC: CPT | Performed by: PHYSICIAN ASSISTANT

## 2018-01-07 RX ADMIN — LOSARTAN POTASSIUM 25 MG: 25 TABLET ORAL at 08:45

## 2018-01-07 RX ADMIN — ATORVASTATIN CALCIUM 10 MG: 10 TABLET, FILM COATED ORAL at 08:45

## 2018-01-07 RX ADMIN — OMEPRAZOLE 20 MG: 20 CAPSULE, DELAYED RELEASE ORAL at 08:45

## 2018-01-07 RX ADMIN — HYDROCODONE BITARTRATE AND ACETAMINOPHEN 1 TABLET: 5; 325 TABLET ORAL at 20:05

## 2018-01-07 RX ADMIN — POLYETHYLENE GLYCOL 3350 17 G: 17 POWDER, FOR SOLUTION ORAL at 08:45

## 2018-01-07 NOTE — ROUTINE PROCESS
Bedside shift change report given to Christianne Stahl RN (oncoming nurse) by Sky Vang RN (offgoing nurse). Report included the following information SBAR, Kardex and MAR.

## 2018-01-07 NOTE — PROGRESS NOTES
Problem: Falls - Risk of  Goal: *Absence of Falls  Document Steve Fall Risk and appropriate interventions in the flowsheet.    Outcome: Progressing Towards Goal  Fall Risk Interventions:  Mobility Interventions: Assess mobility with egress test, OT consult for ADLs, Patient to call before getting OOB, PT Consult for mobility concerns, PT Consult for assist device competence, Utilize walker, cane, or other assitive device         Medication Interventions: Assess postural VS orthostatic hypotension, Patient to call before getting OOB, Teach patient to arise slowly    Elimination Interventions: Call light in reach, Patient to call for help with toileting needs, Toilet paper/wipes in reach, Toileting schedule/hourly rounds    History of Falls Interventions: Consult care management for discharge planning, Door open when patient unattended, Investigate reason for fall

## 2018-01-07 NOTE — PROGRESS NOTES
Cardiology Progress Note      1/7/2018 2:37 PM    Admit Date: 1/5/2018    Admit Diagnosis: Abdominal wall hematoma      Subjective:     Kylah Wan denies chest pain, chest pressure/discomfort. Visit Vitals    /68    Pulse 82    Temp 98.5 °F (36.9 °C)    Resp 16    Wt 75.7 kg (166 lb 14.2 oz)    SpO2 99%    BMI 30.52 kg/m2     Current Facility-Administered Medications   Medication Dose Route Frequency    polyethylene glycol (MIRALAX) packet 17 g  17 g Oral DAILY    docusate sodium (COLACE) capsule 100 mg  100 mg Oral DAILY PRN    omeprazole (PRILOSEC) capsule 20 mg  20 mg Oral DAILY    atorvastatin (LIPITOR) tablet 10 mg  10 mg Oral DAILY    losartan (COZAAR) tablet 25 mg  25 mg Oral DAILY    acetaminophen (TYLENOL) tablet 650 mg  650 mg Oral Q4H PRN    HYDROcodone-acetaminophen (NORCO) 5-325 mg per tablet 1 Tab  1 Tab Oral Q6H PRN    ondansetron (ZOFRAN) injection 4 mg  4 mg IntraVENous Q4H PRN    0.9% sodium chloride infusion  50 mL/hr IntraVENous CONTINUOUS         Objective:      Physical Exam:  Visit Vitals    /68    Pulse 82    Temp 98.5 °F (36.9 °C)    Resp 16    Wt 75.7 kg (166 lb 14.2 oz)    SpO2 99%    BMI 30.52 kg/m2     General Appearance:  Well developed, well nourished,alert and oriented x 3, and individual in no acute distress. Ears/Nose/Mouth/Throat:   Hearing grossly normal.         Neck: Supple. Chest:   Lungs clear to auscultation bilaterally. Cardiovascular:  Irregular rate and rhythm, S1, S2 normal, no murmur. Abdomen:   Soft, non-tender, bowel sounds are active. Abd wall hematoma. Extremities: No edema bilaterally. Skin: Warm and dry.                Data Review:   Labs:    Recent Results (from the past 24 hour(s))   METABOLIC PANEL, BASIC    Collection Time: 01/07/18  3:46 AM   Result Value Ref Range    Sodium 142 136 - 145 mmol/L    Potassium 4.5 3.5 - 5.5 mmol/L    Chloride 110 (H) 100 - 108 mmol/L    CO2 26 21 - 32 mmol/L    Anion gap 6 3.0 - 18 mmol/L    Glucose 99 74 - 99 mg/dL    BUN 15 7.0 - 18 MG/DL    Creatinine 0.89 0.6 - 1.3 MG/DL    BUN/Creatinine ratio 17 12 - 20      GFR est AA >60 >60 ml/min/1.73m2    GFR est non-AA >60 >60 ml/min/1.73m2    Calcium 8.9 8.5 - 10.1 MG/DL   MAGNESIUM    Collection Time: 01/07/18  3:46 AM   Result Value Ref Range    Magnesium 1.9 1.6 - 2.6 mg/dL   CBC WITH AUTOMATED DIFF    Collection Time: 01/07/18  3:46 AM   Result Value Ref Range    WBC 6.1 4.6 - 13.2 K/uL    RBC 2.48 (L) 4.20 - 5.30 M/uL    HGB 7.6 (L) 12.0 - 16.0 g/dL    HCT 23.1 (L) 35.0 - 45.0 %    MCV 93.1 74.0 - 97.0 FL    MCH 30.6 24.0 - 34.0 PG    MCHC 32.9 31.0 - 37.0 g/dL    RDW 14.0 11.6 - 14.5 %    PLATELET 852 853 - 100 K/uL    MPV 10.2 9.2 - 11.8 FL    NEUTROPHILS 58 40 - 73 %    LYMPHOCYTES 31 21 - 52 %    MONOCYTES 10 3 - 10 %    EOSINOPHILS 1 0 - 5 %    BASOPHILS 0 0 - 2 %    ABS. NEUTROPHILS 3.5 1.8 - 8.0 K/UL    ABS. LYMPHOCYTES 1.9 0.9 - 3.6 K/UL    ABS. MONOCYTES 0.6 0.05 - 1.2 K/UL    ABS. EOSINOPHILS 0.1 0.0 - 0.4 K/UL    ABS. BASOPHILS 0.0 0.0 - 0.06 K/UL    DF AUTOMATED     PROTHROMBIN TIME + INR    Collection Time: 01/07/18  3:46 AM   Result Value Ref Range    Prothrombin time 35.2 (H) 11.5 - 15.2 sec    INR 3.6 (H) 0.8 - 1.2         Telemetry: AFIB      Assessment:     Principal Problem:    Abdominal wall hematoma (1/5/2018)    Active Problems:    Atrial fibrillation ([address] (5/25/2012)      CAD (coronary artery disease) (1/5/2018)      Hypercholesterolemia (1/5/2018)      Hypertension (1/5/2018)      Hx of myocardial infarction (1/5/2018)      Elevated INR (1/6/2018)        Plan:     Stable from the cardiac standpoint. H and H has decreased. Follow.      Loraine Lorenzana MD

## 2018-01-07 NOTE — ROUTINE PROCESS
Bedside and Verbal shift change report given to Nydia Park RN (oncoming nurse) by Byron Peck RN (offgoing nurse). Report included the following information SBAR, Intake/Output, MAR, cardiac rhythm Afib and Recent Results.

## 2018-01-07 NOTE — ROUTINE PROCESS
Assumed care of pt. Pt is resting no sign of distress. Call light within reach. Pt had uneventful shift.

## 2018-01-07 NOTE — PROGRESS NOTES
1900:  Bedside and Verbal shift change report received from [employer] (offgoing nurse) to Barbara Angel RN (oncoming nurse). Report included the following information SBAR, Kardex, Intake/Output, MAR, Recent Results and Cardiac Rhythm Afib. Pt resting in bed. Appears to be in no distress at this time. Call bell within reach. Zone phone number given to pt. Pt instructed to call zone phone or call bell if needed. Pt instructed to call for assistance before ambulating. Shift summary:  Pt had uneventful shift.

## 2018-01-07 NOTE — PROGRESS NOTES
Hospitalist Progress Note    Patient: Laurent Jain MRN: [id number]  CSN: [id number]    YOB: 1930  Age: 80 y.o. Sex: female    DOA: 1/5/2018 LOS:  LOS: 0 days          Chief Complaint: Abdominal wall hematoma           Assessment/Plan     Disposition :  Patient Active Problem List   Diagnosis Code    Abdominal pain, other specified site R10.9    Atrial fibrillation ([address] I48.91    Constipation K59.00    Encephalopathy, unspecified G93.40    Abdominal wall hematoma S30. 1XXA    CAD (coronary artery disease) I25.10    Hypercholesterolemia E78.00    Hypertension I10    Hx of myocardial infarction I25.2    Elevated INR R79.1     Abdominal Wall Hematoma: Persistent. 2nd to long term coumadin use and elevated INR. CT: Moderately large left-sided rectus sheath hematoma with small amount of  contiguous intrapelvic, extraperitoneal blood products. Anemia: patient's hemoglobin decreased from 10 yesterday to 7.6 today without evidence of blood loss. Repeat H/H to further evaluate.     Atrial Fibrillation, Chronic: rate controlled.      Elevated INR: Off the coumadin, INR 3.6 today.      Chronic Anticoagulation stopped by cardiology 2nd to hematoma.      CAD: stable. On ARB.      Hypertension: Controlled. On ARB.    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 Tylenol prn for pain     Remain hospital care     Anticipate to home in 1 days. Subjective:    \"My abdominal is still pretty tender; I have not noticed any evidence of blood loss. \"    Review of systems:    Constitutional: denies fevers, chills, myalgias  Respiratory: denies SOB, cough  Cardiovascular: denies chest pain, palpitations  Gastrointestinal: denies nausea, vomiting, diarrhea      Vital signs/Intake and Output:  Visit Vitals    /66 (BP 1 Location: Left arm, BP Patient Position: At rest)    Pulse 74    Temp 98.7 °F (37.1 °C)    Resp 16    Wt 75.7 kg (166 lb 14.2 oz)    SpO2 97%    BMI 30.52 kg/m2     Current Shift:     Last three shifts:  01/05 1901 - 01/07 0700  In: 1924.2 [P.O.:630; I.V.:1294.2]  Out: 1200 [Urine:1200]    Exam:    General: Well developed, alert, NAD, OX3  Head/Neck: NCAT, supple, No masses, No lymphadenopathy  CVS:Regular rate and rhythm, no M/R/G, S1/S2 heard, no thrill  Lungs:Clear to auscultation bilaterally, no wheezes, rhonchi, or rales  Abdomen: Soft, Nontender, No distention, Normal Bowel sounds, No hepatomegaly  Extremities: No C/C/E, pulses palpable 2+  Skin:normal texture and turgor, no rashes, no lesions  Neuro:grossly normal , follows commands  Psych:appropriate                Labs: Results:       Chemistry Recent Labs      01/07/18 0346 01/06/18 0208 01/05/18   1029   GLU  99  103*  164*   NA  142  141  138   K  4.5  4.2  5.3   CL  110*  107  104   CO2  26  24  24   BUN  15  19*  20*   CREA  0.89  0.91  1.22   CA  8.9  9.3  9.7   AGAP  6  10  10   BUCR  17  21*  16   AP   --    --   85   TP   --    --   6.1*   ALB   --    --   3.2*   GLOB   --    --   2.9   AGRAT   --    --   1.1      CBC w/Diff Recent Labs      01/07/18 0346 01/06/18   0208 01/05/18   0924   WBC  6.1  8.5  13.6*   RBC  2.48*  3.25*  3.64*   HGB  7.6*  10.1*  11.1*   HCT  23.1*  30.2*  33.8*   PLT  189  212  273   GRANS  58  69  76*   LYMPH  31  20*  19*   EOS  1  1  0      Cardiac Enzymes Recent Labs      01/05/18   1029   CPK  53   CKND1  CALCULATION NOT PERFORMED WHEN RESULT IS BELOW LINEAR LIMIT      Coagulation Recent Labs      01/07/18 0346 01/06/18   0208   PTP  35.2*  38.9*   INR  3.6*  4.1*       Lipid Panel Lab Results   Component Value Date/Time    Cholesterol, total 147 04/12/2013 09:30 PM    HDL Cholesterol 56 04/12/2013 09:30 PM    LDL, calculated 75.6 04/12/2013 09:30 PM    VLDL, calculated 15.4 04/12/2013 09:30 PM    Triglyceride 77 04/12/2013 09:30 PM    CHOL/HDL Ratio 2.6 04/12/2013 09:30 PM      BNP No results for input(s): BNPP in the last 72 hours.    Liver Enzymes Recent Labs      01/05/18   1029   TP  6.1*   ALB  3.2*   AP  85   SGOT  14*      Thyroid Studies Lab Results   Component Value Date/Time    TSH 5.51 12/10/2014 08:53 PM        Procedures/imaging: see electronic medical records for all procedures/Xrays and details which were not copied into this note but were reviewed prior to creation of Felix Martinez MD

## 2018-01-07 NOTE — ROUTINE PROCESS
Assumed care of pt. Pt is alert no sign of distress. Call light within reach.   Pt had uneventful shift

## 2018-01-07 NOTE — PROGRESS NOTES
Problem: Mobility Impaired (Adult and Pediatric)  Goal: *Acute Goals and Plan of Care (Insert Text)  Physical Therapy Goals LT/ST  Initiated 1/6/2018 and to be accomplished within 3-5 day(s)  1. Patient will move from supine <> sit with S/mod I in prep for out of bed activity and change of position. 2.  Patient will perform sit<> stand with S/mod I with LRAD in prep for transfers/ambulation. 3.  Patient will transfer from bed <> chair with S/mod I with LRAD for time up in chair for completion of ADL activity. 4.  Patient will ambulate 150 feet with S/mod I with LRAD for improved functional mobility/safe discharge. .   5.  Patient will ascend/descend 3-5 stairs with handrail(s) with minimal assistance/contact guard assist for home re-entry as needed. Outcome: Progressing Towards Goal  physical Therapy TREATMENT    Patient: Ann Marie Simpson (39 y.o. female)  Date: 1/7/2018  Diagnosis: Abdominal wall hematoma Abdominal wall hematoma       Precautions:     Chart, physical therapy assessment, plan of care and goals were reviewed. ASSESSMENT:  Patient is progressing well with ambulatory mobility. Patient performed side lying to sit with SBA, requiring additional time due to pain. Patient able to stand up to RW and ambulate into the hallway with SBA. Gait is slow with symmetrical step pattern. No LOB noted. Patient ambulated about 90 feet and took a seated rest break. Reporting minor dizziness. After resting the patient ambulated back towards her room for another 90 feet. Patient able to return herself to bed with SBA, with cues for technique. Will continue PT to further improve functional independence. Recommend home health at this time.   Progression toward goals:  [x]      Improving appropriately and progressing toward goals  []      Improving slowly and progressing toward goals  []      Not making progress toward goals and plan of care will be adjusted     PLAN:  Patient continues to benefit from skilled intervention to address the above impairments. Continue treatment per established plan of care. Discharge Recommendations:  Home Health  Further Equipment Recommendations for Discharge:  Rolling walker     SUBJECTIVE:   Patient stated I have a little bit of pain, about a 7/10.     OBJECTIVE DATA SUMMARY:   Critical Behavior:  Neurologic State: Alert, Appropriate for age  Orientation Level: Oriented X4  Cognition: Appropriate decision making, Appropriate for age attention/concentration, Appropriate safety awareness, Follows commands     Functional Mobility Training:  Bed Mobility:  Supine to Sit: Stand-by asssistance; Additional time  Sit to Supine: Stand-by asssistance; Additional time  Transfers:  Sit to Stand: Stand-by asssistance; Additional time  Stand to Sit: Stand-by asssistance; Additional time  Balance:  Sitting: Intact  Standing: Intact; With support  Ambulation/Gait Training:  Distance (ft): 180 Feet (ft) (90+90)  Assistive Device: Gait belt;Walker, rolling  Ambulation - Level of Assistance: Stand-by asssistance  Gait Abnormalities: Decreased step clearance  Speed/Tangela: Slow  Step Length: Right shortened;Left shortened  Interventions: Verbal cues; Safety awareness training    Pain:  Pain Scale 1: Numeric (0 - 10) 7/10  Pain Intensity 1: 0  Activity Tolerance:   Fair  Please refer to the flowsheet for vital signs taken during this treatment.   After treatment:   [] Patient left in no apparent distress sitting up in chair  [x] Patient left in no apparent distress in bed  [x] Call bell left within reach  [x] Nursing notified  [] Caregiver present  [] Bed alarm activated      Hector Grayson   Time Calculation: 23 mins

## 2018-01-08 VITALS
SYSTOLIC BLOOD PRESSURE: 132 MMHG | OXYGEN SATURATION: 97 % | WEIGHT: 165.9 LBS | RESPIRATION RATE: 16 BRPM | DIASTOLIC BLOOD PRESSURE: 75 MMHG | HEART RATE: 66 BPM | BODY MASS INDEX: 30.34 KG/M2 | TEMPERATURE: 98.2 F

## 2018-01-08 LAB
ANION GAP SERPL CALC-SCNC: 9 MMOL/L (ref 3–18)
BASOPHILS # BLD: 0 K/UL (ref 0–0.06)
BASOPHILS NFR BLD: 0 % (ref 0–2)
BUN SERPL-MCNC: 13 MG/DL (ref 7–18)
BUN/CREAT SERPL: 16 (ref 12–20)
CALCIUM SERPL-MCNC: 8.9 MG/DL (ref 8.5–10.1)
CHLORIDE SERPL-SCNC: 107 MMOL/L (ref 100–108)
CO2 SERPL-SCNC: 26 MMOL/L (ref 21–32)
CREAT SERPL-MCNC: 0.8 MG/DL (ref 0.6–1.3)
DIFFERENTIAL METHOD BLD: ABNORMAL
EOSINOPHIL # BLD: 0.1 K/UL (ref 0–0.4)
EOSINOPHIL NFR BLD: 2 % (ref 0–5)
ERYTHROCYTE [DISTWIDTH] IN BLOOD BY AUTOMATED COUNT: 14.1 % (ref 11.6–14.5)
GLUCOSE SERPL-MCNC: 98 MG/DL (ref 74–99)
HCT VFR BLD AUTO: 24.7 % (ref 35–45)
HGB BLD-MCNC: 8.1 G/DL (ref 12–16)
INR PPP: 2.1 (ref 0.8–1.2)
LYMPHOCYTES # BLD: 1.5 K/UL (ref 0.9–3.6)
LYMPHOCYTES NFR BLD: 32 % (ref 21–52)
MAGNESIUM SERPL-MCNC: 2 MG/DL (ref 1.6–2.6)
MCH RBC QN AUTO: 30.8 PG (ref 24–34)
MCHC RBC AUTO-ENTMCNC: 32.8 G/DL (ref 31–37)
MCV RBC AUTO: 93.9 FL (ref 74–97)
MONOCYTES # BLD: 0.4 K/UL (ref 0.05–1.2)
MONOCYTES NFR BLD: 9 % (ref 3–10)
NEUTS SEG # BLD: 2.8 K/UL (ref 1.8–8)
NEUTS SEG NFR BLD: 57 % (ref 40–73)
PLATELET # BLD AUTO: 209 K/UL (ref 135–420)
PMV BLD AUTO: 10.2 FL (ref 9.2–11.8)
POTASSIUM SERPL-SCNC: 4.3 MMOL/L (ref 3.5–5.5)
PROTHROMBIN TIME: 22.5 SEC (ref 11.5–15.2)
RBC # BLD AUTO: 2.63 M/UL (ref 4.2–5.3)
SODIUM SERPL-SCNC: 142 MMOL/L (ref 136–145)
WBC # BLD AUTO: 4.8 K/UL (ref 4.6–13.2)

## 2018-01-08 PROCEDURE — 85610 PROTHROMBIN TIME: CPT | Performed by: PHYSICIAN ASSISTANT

## 2018-01-08 PROCEDURE — 74011250637 HC RX REV CODE- 250/637: Performed by: FAMILY MEDICINE

## 2018-01-08 PROCEDURE — 36415 COLL VENOUS BLD VENIPUNCTURE: CPT | Performed by: PHYSICIAN ASSISTANT

## 2018-01-08 PROCEDURE — 83735 ASSAY OF MAGNESIUM: CPT | Performed by: PHYSICIAN ASSISTANT

## 2018-01-08 PROCEDURE — 85025 COMPLETE CBC W/AUTO DIFF WBC: CPT | Performed by: PHYSICIAN ASSISTANT

## 2018-01-08 PROCEDURE — 97110 THERAPEUTIC EXERCISES: CPT

## 2018-01-08 PROCEDURE — 80048 BASIC METABOLIC PNL TOTAL CA: CPT | Performed by: PHYSICIAN ASSISTANT

## 2018-01-08 PROCEDURE — 97116 GAIT TRAINING THERAPY: CPT

## 2018-01-08 RX ORDER — GUAIFENESIN 100 MG/5ML
81 LIQUID (ML) ORAL DAILY
Qty: 30 TAB | Refills: 0 | Status: SHIPPED | OUTPATIENT
Start: 2018-01-08 | End: 2018-07-18

## 2018-01-08 RX ADMIN — OMEPRAZOLE 20 MG: 20 CAPSULE, DELAYED RELEASE ORAL at 09:36

## 2018-01-08 RX ADMIN — LOSARTAN POTASSIUM 25 MG: 25 TABLET ORAL at 09:36

## 2018-01-08 RX ADMIN — POLYETHYLENE GLYCOL 3350 17 G: 17 POWDER, FOR SOLUTION ORAL at 09:36

## 2018-01-08 RX ADMIN — ATORVASTATIN CALCIUM 10 MG: 10 TABLET, FILM COATED ORAL at 09:36

## 2018-01-08 NOTE — PROGRESS NOTES
[facility]  care  2005- Assessment completed, Pt requested [place] for left abdomen pain  2100- reassessment completed, Pt resting with no complaints. Denies pain, ice pack to the left abdomen.

## 2018-01-08 NOTE — PROGRESS NOTES
7126 Assumed patient care from off going nurse Iban Silva RN. Patient is alert x 4 resting in bed and appears to be in no sign of distress. Bed left in lowest position with bed alarm set, call bell left within reach.

## 2018-01-08 NOTE — PROGRESS NOTES
Cardiology Progress Note      1/8/2018 12:45 PM    Admit Date: 1/5/2018    Admit Diagnosis: Abdominal wall hematoma  Abdominal wall hematoma      Subjective:     Elodia Yuan denies chest pain, chest pressure/discomfort, dyspnea, palpitations. Visit Vitals    /75 (BP 1 Location: Left arm, BP Patient Position: At rest;Supine; Head of bed elevated (Comment degrees))    Pulse 66    Temp 98.2 °F (36.8 °C)    Resp 16    Wt 75.3 kg (165 lb 14.4 oz)    SpO2 97%    BMI 30.34 kg/m2     Current Facility-Administered Medications   Medication Dose Route Frequency    polyethylene glycol (MIRALAX) packet 17 g  17 g Oral DAILY    docusate sodium (COLACE) capsule 100 mg  100 mg Oral DAILY PRN    omeprazole (PRILOSEC) capsule 20 mg  20 mg Oral DAILY    atorvastatin (LIPITOR) tablet 10 mg  10 mg Oral DAILY    losartan (COZAAR) tablet 25 mg  25 mg Oral DAILY    acetaminophen (TYLENOL) tablet 650 mg  650 mg Oral Q4H PRN    HYDROcodone-acetaminophen (NORCO) 5-325 mg per tablet 1 Tab  1 Tab Oral Q6H PRN    ondansetron (ZOFRAN) injection 4 mg  4 mg IntraVENous Q4H PRN    0.9% sodium chloride infusion  50 mL/hr IntraVENous CONTINUOUS         Objective:      Physical Exam:  Visit Vitals    /75 (BP 1 Location: Left arm, BP Patient Position: At rest;Supine; Head of bed elevated (Comment degrees))    Pulse 66    Temp 98.2 °F (36.8 °C)    Resp 16    Wt 75.3 kg (165 lb 14.4 oz)    SpO2 97%    BMI 30.34 kg/m2     General Appearance:  Well developed, well nourished,alert and oriented x 3, and individual in no acute distress. Ears/Nose/Mouth/Throat:   Hearing grossly normal.         Neck: Supple. Chest:   Lungs clear to auscultation bilaterally. Cardiovascular:  Irregular rate and rhythm, S1, S2 normal, no murmur. Abdomen:   Soft, non-tender, bowel sounds are active. Hematoma of abd wall. Extremities: No edema bilaterally. Skin: Warm and dry.                Data Review:   Labs:    Recent Results (from the past 24 hour(s))   METABOLIC PANEL, BASIC    Collection Time: 01/08/18  4:23 AM   Result Value Ref Range    Sodium 142 136 - 145 mmol/L    Potassium 4.3 3.5 - 5.5 mmol/L    Chloride 107 100 - 108 mmol/L    CO2 26 21 - 32 mmol/L    Anion gap 9 3.0 - 18 mmol/L    Glucose 98 74 - 99 mg/dL    BUN 13 7.0 - 18 MG/DL    Creatinine 0.80 0.6 - 1.3 MG/DL    BUN/Creatinine ratio 16 12 - 20      GFR est AA >60 >60 ml/min/1.73m2    GFR est non-AA >60 >60 ml/min/1.73m2    Calcium 8.9 8.5 - 10.1 MG/DL   MAGNESIUM    Collection Time: 01/08/18  4:23 AM   Result Value Ref Range    Magnesium 2.0 1.6 - 2.6 mg/dL   CBC WITH AUTOMATED DIFF    Collection Time: 01/08/18  4:23 AM   Result Value Ref Range    WBC 4.8 4.6 - 13.2 K/uL    RBC 2.63 (L) 4.20 - 5.30 M/uL    HGB 8.1 (L) 12.0 - 16.0 g/dL    HCT 24.7 (L) 35.0 - 45.0 %    MCV 93.9 74.0 - 97.0 FL    MCH 30.8 24.0 - 34.0 PG    MCHC 32.8 31.0 - 37.0 g/dL    RDW 14.1 11.6 - 14.5 %    PLATELET 431 221 - 610 K/uL    MPV 10.2 9.2 - 11.8 FL    NEUTROPHILS 57 40 - 73 %    LYMPHOCYTES 32 21 - 52 %    MONOCYTES 9 3 - 10 %    EOSINOPHILS 2 0 - 5 %    BASOPHILS 0 0 - 2 %    ABS. NEUTROPHILS 2.8 1.8 - 8.0 K/UL    ABS. LYMPHOCYTES 1.5 0.9 - 3.6 K/UL    ABS. MONOCYTES 0.4 0.05 - 1.2 K/UL    ABS. EOSINOPHILS 0.1 0.0 - 0.4 K/UL    ABS. BASOPHILS 0.0 0.0 - 0.06 K/UL    DF AUTOMATED     PROTHROMBIN TIME + INR    Collection Time: 01/08/18  4:23 AM   Result Value Ref Range    Prothrombin time 22.5 (H) 11.5 - 15.2 sec    INR 2.1 (H) 0.8 - 1.2         Telemetry: AFIB      Assessment:     Principal Problem:    Abdominal wall hematoma (1/5/2018)    Active Problems:    Atrial fibrillation ([address] (5/25/2012)      CAD (coronary artery disease) (1/5/2018)      Hypercholesterolemia (1/5/2018)      Hypertension (1/5/2018)      Hx of myocardial infarction (1/5/2018)      Elevated INR (1/6/2018)        Plan:     Stable from cardiac standpoint. Home on ASA 81 mg every day. Follow up in office.      Karey Forbes Anyi Gibbons MD

## 2018-01-08 NOTE — ROUTINE PROCESS
Bedside shift change report given to Nichole Fox (oncoming nurse) by Lacey Johnson RN (offgoing nurse). Report included the following information SBAR, Kardex and MAR.

## 2018-01-08 NOTE — ROUTINE PROCESS
Bedside and Verbal shift change report given to Stevo Lucas RN (oncoming nurse) by Sreekanth Garza RN (offgoing nurse). Report included the following information SBAR and Kardex.

## 2018-01-08 NOTE — PROGRESS NOTES
Problem: Mobility Impaired (Adult and Pediatric)  Goal: *Acute Goals and Plan of Care (Insert Text)  Physical Therapy Goals LT/ST  Initiated 1/6/2018 and to be accomplished within 3-5 day(s)  1. Patient will move from supine <> sit with S/mod I in prep for out of bed activity and change of position. 2.  Patient will perform sit<> stand with S/mod I with LRAD in prep for transfers/ambulation. 3.  Patient will transfer from bed <> chair with S/mod I with LRAD for time up in chair for completion of ADL activity. 4.  Patient will ambulate 150 feet with S/mod I with LRAD for improved functional mobility/safe discharge. .   5.  Patient will ascend/descend 3-5 stairs with handrail(s) with minimal assistance/contact guard assist for home re-entry as needed. Outcome: Progressing Towards Goal  physical Therapy TREATMENT    Patient: Khushboo Diaz (21 y.o. female)  Date: 1/8/2018  Diagnosis: Abdominal wall hematoma  Abdominal wall hematoma Abdominal wall hematoma       Precautions:     Chart, physical therapy assessment, plan of care and goals were reviewed. ASSESSMENT:  Pt seen in bed prior to session w/ telemonitor and B/L SCDs donned. Pt very motivated to participate in session. Pt able to ambulate 100 ft w/ RW/GB but continues to demosntrate decrease tram and requires VCing to maintain body inside walking while ambulating. Pt reported decrease activity tolerance while ambulating and was transferred back to room where pt perform therex activity. Pt educated on proper sequencing w/ RW to ensure proper hand placement w/ RW to ensure stability while performing transfers sit<>stand. Encouraged pt to sit in upright chair after session however pt requested to transfer back to bed. Pt left in supine after session, call bell and tray in reach, B/L SCDs donned, nurse notified after session.    Progression toward goals:  [x]      Improving appropriately and progressing toward goals  []      Improving slowly and progressing toward goals  []      Not making progress toward goals and plan of care will be adjusted     PLAN:  Patient continues to benefit from skilled intervention to address the above impairments. Continue treatment per established plan of care. Discharge Recommendations:  Home Health  Further Equipment Recommendations for Discharge:  rolling walker     SUBJECTIVE:   Patient stated I feel okay today.     OBJECTIVE DATA SUMMARY:   Critical Behavior:  Neurologic State: Alert, Appropriate for age, Eyes open spontaneously  Orientation Level: Oriented X4  Cognition: Appropriate decision making, Appropriate for age attention/concentration, Appropriate safety awareness, Follows commands  Functional Mobility Training:  Bed Mobility:  Supine to Sit: Supervision  Sit to Supine: Supervision  Transfers:  Sit to Stand: Supervision;Stand-by asssistance  Stand to Sit: Supervision;Stand-by asssistance  Balance:  Sitting: Intact  Standing: Intact; With support  Ambulation/Gait Training:  Distance (ft): 100 Feet (ft)  Assistive Device: Gait belt;Walker, rolling  Ambulation - Level of Assistance: Supervision;Stand-by asssistance  Gait Abnormalities: Decreased step clearance  Speed/Tangela: Slow  Step Length: Left shortened;Right shortened  Interventions: Safety awareness training; Tactile cues; Verbal cues  Therapeutic Exercises:       EXERCISE   Sets   Reps   Active Active Assist   Passive Self ROM   Comments   Ankle Pumps 1 10  [x] [] [] []    Quad Sets/Glut Sets   [] [] [] []    Hamstring Sets   [] [] [] []    Short Arc Quads   [] [] [] []    Heel Slides   [] [] [] []    Straight Leg Raises   [] [] [] []    Hip Abd/Add 2 10 [x] [] [] []    Long Arc Quads 2 10 [x] [] [] []    Seated Marching 2 10 [x] [] [] []    Standing Marching   [] [] [] []       [] [] [] []      Pain:  Pain Scale 1: Numeric (0 - 10)  Pain Intensity 1: 0  Activity Tolerance:   Good  Please refer to the flowsheet for vital signs taken during this treatment.   After treatment:   [] Patient left in no apparent distress sitting up in chair  [x] Patient left in no apparent distress in bed  [x] Call bell left within reach  [x] Nursing notified  [] Caregiver present  [] Bed alarm activated      Anju Thorne, PT   Time Calculation: 25 mins

## 2018-01-08 NOTE — DISCHARGE SUMMARY
Discharge Summary    Patient: Fco Thomas MRN: [id number]  CSN: [id number]    YOB: 1930  Age: 80 y.o. Sex: female    DOA: 1/5/2018 LOS:  LOS: 1 day   Discharge Date:      Primary Care Provider:  Rafaela Frye MD    Admission Diagnoses: Abdominal wall hematoma  Abdominal wall hematoma    Discharge Diagnoses:    Problem List as of 1/8/2018  Date Reviewed: 1/6/2018          Codes Class Noted - Resolved    Elevated INR ICD-10-CM: R79.1  ICD-9-CM: 790.92  1/6/2018 - Present        * (Principal)Abdominal wall hematoma ICD-10-CM: S30. 1XXA  ICD-9-CM: 922.2  1/5/2018 - Present        CAD (coronary artery disease) ICD-10-CM: I25.10  ICD-9-CM: 414.00  1/5/2018 - Present        Hypercholesterolemia ICD-10-CM: E78.00  ICD-9-CM: 272.0  1/5/2018 - Present        Hypertension ICD-10-CM: I10  ICD-9-CM: 401.9  1/5/2018 - Present        Hx of myocardial infarction ICD-10-CM: I25.2  ICD-9-CM: 829  1/5/2018 - Present        Encephalopathy, unspecified ICD-10-CM: G93.40  ICD-9-CM: 348.30  4/12/2013 - Present        Abdominal pain, other specified site ICD-10-CM: R10.9  ICD-9-CM: 789.09  5/25/2012 - Present        Atrial fibrillation ([facility] [address] ICD-10-CM: I48.91  ICD-9-CM: 427.31  5/25/2012 - Present        Constipation ICD-10-CM: K59.00  ICD-9-CM: 564.00  5/25/2012 - Present              Discharge Medications:     Current Discharge Medication List      START taking these medications    Details   aspirin 81 mg chewable tablet Take 1 Tab by mouth daily. Qty: 30 Tab, Refills: 0         CONTINUE these medications which have NOT CHANGED    Details   omeprazole (PRILOSEC) 20 mg capsule Take 20 mg by mouth daily. METOCLOPRAMIDE HCL (REGLAN PO) Take 10 mg by mouth daily. ATORVASTATIN CALCIUM (LIPITOR PO) Take 10 mg by mouth daily. HYDROcodone-acetaminophen (NORCO) 5-325 mg per tablet Take 1 tablet by mouth every eight (8) hours as needed for Pain.   Qty: 12 tablet, Refills: 0      TRIAMTERENE PO Take  by mouth.      VALSARTAN (DIOVAN PO) Take 320 mg by mouth daily. PROMETHAZINE HCL (PROMETHAZINE PO) Take 12.5 mg by mouth every six (6) hours as needed. STOP taking these medications       warfarin (COUMADIN) 2.5 mg tablet Comments:   Reason for Stopping:               Discharge Condition: Stable    Procedures : None    Consults: Cardiology and General Surgery      PHYSICAL EXAM    Visit Vitals    /75 (BP 1 Location: Left arm, BP Patient Position: At rest;Supine; Head of bed elevated (Comment degrees))    Pulse 66    Temp 98.2 °F (36.8 °C)    Resp 16    Wt 75.3 kg (165 lb 14.4 oz)    SpO2 97%    BMI 30.34 kg/m2     General: Awake, cooperative, no acute distress    HEENT: NC, Atraumatic. PERRLA, EOMI. Anicteric sclerae. Lungs:  CTA Bilaterally. No Wheezing/Rhonchi/Rales. Heart:  Irregular rhythm,  No murmur, No Rubs, No Gallops  Abdomen: Soft, Non distended, LUQ/LLQ tender to palpation. +Bowel sounds,   Extremities: No c/c/e  Psych:   Not anxious or agitated. Neurologic:  No acute neurological deficits. Admission HPI : Yfn Wheeler is a 80 y.o. female who has a history of HTN, atrial fibrillation, CAD, hypercholesterolemia, hx of MI, episodes of hypoglycemia, and PSHx of cholecystectomy and knee replacement presents to the ED today with complaints of abdominal pain. She states the pain has actually improved over the last day or so, but has been present since around Barbara. The patient states that this morning she awoke and went to use the lavatory and one way or another, she does not recall, ended up on the ground. She denies LOC, did not hit her head. She states her son, who lives with her, assisted her up off of the floor, and the patient then called EMS to transport her to the ED. In the ED, she was found to have an abdominal wall hematoma, and medicine has been asked to admit for further care.  The patient admits to occasional drinking, denies smoking. Complaints of abdominal pain and right knee pain. Denies any vomiting, constipation, diarrhea, chills, SOB. Hospital Course : This patient was admitted to medical services on January 5, 2017 for a spontaneous abdominal wall hematoma. At the time of admission, it was also noted that she had a supratherapeutic INR, as she is chronically on coumadin. She was admitted and general surgery was consulted in regards to the hematoma. Dr Moose Garcia saw this patient and opted for conservative management of the hematoma at this time. Cardiology was consulted in regards to this patient's needs for coumadin given her spontaneous hematoma and advanced age. Dr Willy Sanchez saw the patient, and ultimately recommended 81mg of aspirin daily, with discontinuation of her coumadin. Activity: Activity as tolerated    Diet: Regular Diet    Follow-up: This patient was instructed to follow up with her PCP in regards to her hospitalization. She was also instructed to follow up with her cardiologist for continued care. Disposition: This patient will be discharged home in stable condition. She had an uneventful hospitalization and showed no signs of bleeding during her stay. Minutes spent on discharge: 32 minutes       Labs: Results:       Chemistry Recent Labs      01/08/18   0423  01/07/18   0346  01/06/18   0208   GLU  98  99  103*   NA  142  142  141   K  4.3  4.5  4.2   CL  107  110*  107   CO2  26  26  24   BUN  13  15  19*   CREA  0.80  0.89  0.91   CA  8.9  8.9  9.3   AGAP  9  6  10   BUCR  16  17  21*      CBC w/Diff Recent Labs      01/08/18   0423  01/07/18   0346  01/06/18   0208   WBC  4.8  6.1  8.5   RBC  2.63*  2.48*  3.25*   HGB  8.1*  7.6*  10.1*   HCT  24.7*  23.1*  30.2*   PLT  209  189  212   GRANS  57  58  69   LYMPH  32  31  20*   EOS  2  1  1      Cardiac Enzymes No results for input(s): CPK, CKND1, ZACARIAS in the last 72 hours.     No lab exists for component: CKRMB, TROIP   Coagulation Recent Labs      01/08/18 0423  01/07/18   0346   PTP  22.5*  35.2*   INR  2.1*  3.6*       Lipid Panel Lab Results   Component Value Date/Time    Cholesterol, total 147 04/12/2013 09:30 PM    HDL Cholesterol 56 04/12/2013 09:30 PM    LDL, calculated 75.6 04/12/2013 09:30 PM    VLDL, calculated 15.4 04/12/2013 09:30 PM    Triglyceride 77 04/12/2013 09:30 PM    CHOL/HDL Ratio 2.6 04/12/2013 09:30 PM      BNP No results for input(s): BNPP in the last 72 hours. Liver Enzymes No results for input(s): TP, ALB, TBIL, AP, SGOT, GPT in the last 72 hours. No lab exists for component: DBIL   Thyroid Studies Lab Results   Component Value Date/Time    TSH 5.51 12/10/2014 08:53 PM            Significant Diagnostic Studies: Xr Knee Rt Min 4 V    Result Date: 1/5/2018  Right knee, 4 views COMPARISON: None. INDICATION: Fall with right knee pain FINDINGS: AP, lateral, and oblique views of the right knee were obtained. No acute fracture or dislocation. Moderately severe tricompartmental degenerative joint space narrowing and marginal osteophytosis. Medial and lateral femorotibial compartment chondrocalcinosis, most often seen with CPPD. Mild superficial infrapatellar soft tissue swelling. Small superior patellar enthesophyte involving distal quadriceps tendon insertion. No right knee joint effusion. IMPRESSION: No acute osseous abnormality. Superficial infrapatellar soft tissue contusion. Degenerative changes, as described. Ct Abd Pelv Wo Cont    Result Date: 1/5/2018  EXAM: CT of the abdomen and pelvis INDICATION: Pelvic pain, radiating to the back. COMPARISON: 1/17/2015 TECHNIQUE: Axial CT imaging of the abdomen and pelvis was performed without oral or intravenous contrast. Multiplanar reformats were generated. One or more dose reduction techniques were used on this CT: automated exposure control, adjustment of the mAs and/or kVp according to patient's size, and iterative reconstruction techniques.  The specific techniques utilized on this CT exam have been documented in the patient's electronic medical record. _______________ FINDINGS: LOWER CHEST: Minor dependent atelectasis is present without evidence of focal pneumonic opacity or pleural effusion. Cardiac size is normal. There is no pericardial effusion. LIVER, BILIARY: The unenhanced appearance of the liver is within normal limits. No intrahepatic biliary ductal dilatation. Postop changes of cholecystectomy with reservoir effect redemonstrated involving the common bile duct, similar to slightly decreased in magnitude from prior abdominal pelvic CT exam. PANCREAS: Mild diffuse fatty infiltration of the pancreas is present. SPLEEN: Normal. ADRENALS: Normal. KIDNEYS/URETERS/BLADDER: No hydronephrosis or nephrolithiasis. PELVIC ORGANS: Uterus has an unremarkable CT appearance. Hypoattenuating oval right adnexal nodule redemonstrated, measuring approximately 6.3 cm in size. VASCULATURE: Diffuse aortobiiliac atherosclerotic calcification is present without evidence of aneurysmal dilatation. LYMPH NODES: There are scattered subcentimeter mesenteric and retroperitoneal lymph nodes, without evidence of abdominal or pelvic adenopathy. GASTROINTESTINAL TRACT: A small hiatal hernia is present. Small intestine and large intestine are normal in course and caliber. No bowel obstruction. No free intraperitoneal gas. Extensive colonic diverticulosis is present without evidence to suggest associated diverticulitis. BONES: No acute or aggressive osseous abnormalities identified. Levorotatory scoliosis is redemonstrated. Transitional lumbosacral anatomy is present, with lumbarization of the first sacral segment. There is anterolisthesis of L5 on S1, unchanged, with multilevel spot spondylosis and lower lumbar facet osteoarthrosis present. OTHER: Within the substance of the left rectus abdominis musculature, there is a hematoma present which measures approximately 10.0 x 6.5 x 16 cm.  There is a small amount of contiguous extraperitoneal blood products present within the superior and left lateral portions of the pelvis, with mild mass effect on the adjacent bladder. _______________     IMPRESSION: 1. Moderately large left-sided rectus sheath hematoma with small amount of contiguous intrapelvic, extraperitoneal blood products. 2. Small hiatal hernia. Diverticulosis without evidence of diverticulitis. 3. Right adnexal cyst, slightly enlarged in the interval from prior exam of 1/17/2015. As previously, nonemergent outpatient ultrasound follow-up is recommended if not performed in the interval from prior CT exam. 4. Prior cholecystectomy and common bile duct reservoir effect. Xr Chest Port    Result Date: 1/5/2018  Chest, single view Indication: Chest and upper abdominal pain Comparison: 1/17/2015 Findings:  Portable upright AP view of the chest was obtained. The cardiomediastinal silhouette is within normal limits. Tortuous and atherosclerotic thoracic aorta. No central pulmonary vascular congestion. Lungs are hyperinflated. No acute airspace opacity. Mild central bronchiectasis. Minimal left basilar atelectasis/scarring. No pleural effusion nor pneumothorax. No acute osseous abnormality. Impression: No radiographic evidence of an acute abnormality. No results found for this or any previous visit.         CC: Jabari Napier MD

## 2018-01-08 NOTE — ROUTINE PROCESS
Dual AVS reviewed with Henry Sánchez RN. All medications reviewed individually with patient. Opportunities for questions and concerns provided. Patient discharged via (mode of transport ie. Car, ambulance or air transport) car with patient. Patient's arm band appropriately discarded.

## 2018-01-08 NOTE — DISCHARGE INSTRUCTIONS
DISCHARGE SUMMARY from Nurse    PATIENT INSTRUCTIONS:    After general anesthesia or intravenous sedation, for 24 hours or while taking prescription Narcotics:  · Limit your activities  · Do not drive and operate hazardous machinery  · Do not make important personal or business decisions  · Do  not drink alcoholic beverages  · If you have not urinated within 8 hours after discharge, please contact your surgeon on call. Report the following to your surgeon:  · Excessive pain, swelling, redness or odor of or around the surgical area  · Temperature over 100.5  · Nausea and vomiting lasting longer than 4 hours or if unable to take medications  · Any signs of decreased circulation or nerve impairment to extremity: change in color, persistent  numbness, tingling, coldness or increase pain  · Any questions    What to do at Home:  Recommended activity: Activity as tolerated. Please follow up with primary care provider. *  Please give a list of your current medications to your Primary Care Provider. *  Please update this list whenever your medications are discontinued, doses are      changed, or new medications (including over-the-counter products) are added. *  Please carry medication information at all times in case of emergency situations. These are general instructions for a healthy lifestyle:    No smoking/ No tobacco products/ Avoid exposure to second hand smoke  Surgeon General's Warning:  Quitting smoking now greatly reduces serious risk to your health.     Obesity, smoking, and sedentary lifestyle greatly increases your risk for illness    A healthy diet, regular physical exercise & weight monitoring are important for maintaining a healthy lifestyle    You may be retaining fluid if you have a history of heart failure or if you experience any of the following symptoms:  Weight gain of 3 pounds or more overnight or 5 pounds in a week, increased swelling in our hands or feet or shortness of breath while lying flat in bed. Please call your doctor as soon as you notice any of these symptoms; do not wait until your next office visit. Recognize signs and symptoms of STROKE:    F-face looks uneven    A-arms unable to move or move unevenly    S-speech slurred or non-existent    T-time-call 911 as soon as signs and symptoms begin-DO NOT go       Back to bed or wait to see if you get better-TIME IS BRAIN. Warning Signs of HEART ATTACK     Call 911 if you have these symptoms:   Chest discomfort. Most heart attacks involve discomfort in the center of the chest that lasts more than a few minutes, or that goes away and comes back. It can feel like uncomfortable pressure, squeezing, fullness, or pain.  Discomfort in other areas of the upper body. Symptoms can include pain or discomfort in one or both arms, the back, neck, jaw, or stomach.  Shortness of breath with or without chest discomfort.  Other signs may include breaking out in a cold sweat, nausea, or lightheadedness. Don't wait more than five minutes to call 911 - MINUTES MATTER! Fast action can save your life. Calling 911 is almost always the fastest way to get lifesaving treatment. Emergency Medical Services staff can begin treatment when they arrive -- up to an hour sooner than if someone gets to the hospital by car. The discharge information has been reviewed with the patient. The patient verbalized understanding. Discharge medications reviewed with the patient and appropriate educational materials and side effects teaching were provided. ___________________________________________________________________________________________________________________________________     Taking Aspirin to Prevent Heart Attack and Stroke: Care Instructions  Your Care Instructions    Aspirin acts as a \"blood thinner. \" It prevents blood clots from forming. When taken during and after a heart attack, it can reduce your chance of dying.  And it's used if you have a stent in your coronary artery. Also, aspirin helps certain people lower their risk of a heart attack or stroke. Be sure you know what dose of aspirin to take and how often to take it. Low-dose aspirin is typically 81 mg. But the dose for daily aspirin can range from 81 mg to 325 mg. Taking aspirin every day can cause bleeding. It may not be safe if you have stomach ulcers. And it may not be safe if you have high blood pressure that is not controlled. If you take aspirin pills every day, do not take ones that have other ingredients such as caffeine or sodium. Before you start to take aspirin, tell your doctor all the medicines, vitamins, herbal products, and supplements you take. Follow-up care is a key part of your treatment and safety. Be sure to make and go to all appointments, and call your doctor if you are having problems. It's also a good idea to know your test results and keep a list of the medicines you take. How can you care for yourself at home? · Take aspirin with a full glass of water unless your doctor tells you not to. Do not lie down right after you take it. · If you have a stent in your coronary artery, take your aspirin as your heart doctor says to. If another doctor says to stop taking the aspirin for any reason, talk to your heart doctor before you stop. · Do not chew or crush the coated or sustained-release forms of aspirin. · Ask your doctor if you can drink alcohol while you take aspirin. And ask how much you can drink. Too much alcohol with aspirin can cause stomach bleeding. · Do not take aspirin if you are pregnant, unless your doctor says it is okay. · Keep all aspirin out of children's reach. · Throw aspirin away if it starts to smell like vinegar. · Do not take aspirin if you have gout or if you take prescription blood thinners, unless your doctor has told you to.   · Do not take prescription or over-the-counter medicines, vitamins, herbal products, or supplements without talking to your doctor first. Amy Esparzaons the label before you take another over-the-counter medicine. Many contain aspirin. So they could cause you to take too much aspirin. · Talk with your doctor before you take a pain medicine. Ask which type of medicine you can take and how to take it safely with aspirin. · Tell your doctor or dentist before a surgery or procedure that you take aspirin. He or she will tell you if you should stop taking aspirin before your surgery or procedure. Make sure that you understand exactly what your doctor wants you to do. Where can you learn more? Go to http://juan jose[employer] Health Dialogcindy.info/. Enter U371 in the search box to learn more about \"Taking Aspirin to Prevent Heart Attack and Stroke: Care Instructions. \"  Current as of: September 21, 2016  Content Version: 11.4  © 7123-4716 [employer]. Care instructions adapted under license by [employer] (which disclaims liability or warranty for this information). If you have questions about a medical condition or this instruction, always ask your healthcare professional. [place] [address] any warranty or liability for your use of this information. Learning About Your Stroke Risk When You Have Atrial Fibrillation  How does atrial fibrillation affect your stroke risk? Normally, the heart beats in a strong, steady rhythm. In atrial fibrillation, the two upper parts of the heart (the atria) quiver, or fibrillate, and the heart does not beat in a regular rhythm. Your heart rate also may be faster than normal.  This can be dangerous because if the heartbeat isn't strong and steady, blood can collect, or pool, in the heart. And pooled blood is more likely to form clots. Clots can travel to the brain, block blood flow, and cause a stroke. A stroke can cause sudden numbness or weakness of the face, arm, or leg, especially on one side of the body.  Strokes can also cause sudden confusion, trouble speaking or understanding, or even trouble seeing in one or both eyes. Strokes can even cause death. Atrial fibrillation increases your stroke risk. But not everyone with atrial fibrillation has the same stroke risk. How do you know what your stroke risk is? Your doctor can help you know your risk based on your age, sex, and health. Things that raise your risk are called risk factors. The more risk factors you have, the higher your risk. Risk factors for stroke include:  · Age. Being older than 72 raises your risk. · Being female. Women are at higher risk. · Other health problems. You may have other health problems that raise your risk. These include:  ¨ Heart failure. ¨ High blood pressure. ¨ A previous stroke or transient ischemic attack (TIA). ¨ Heart attack, peripheral arterial disease, or other blood vessel disease. ¨ Diabetes. Your doctor will use a kind of scorecard to add up your risk factors and estimate your risk for stroke. This score can help you and your doctor decide how to lower your risk. Should you take medicine to lower your stroke risk? When you know what your stroke risk is, you and your doctor can talk about your options. You'll decide whether or not to take medicine-aspirin or anticoagulants-to help prevent blood clots. These medicines are often called blood thinners, but they don't actually thin your blood. Instead, they increase the time it takes for a blood clot to form. Blood thinners lower the risk of stroke in people who have atrial fibrillation. But how much your risk will be lowered depends on how high your risk was to start with. There are risks to taking a blood thinner. When your blood clots more slowly, you have a higher risk of bleeding problems. These problems include bleeding problems in and around the brain, bleeding in the stomach and intestines, bruising and bleeding if you are hurt, and serious skin rash.   You and your doctor can compare your risk of stroke with your risk of bleeding from the medicine. You can also discuss how you feel about taking medicine every day. Follow-up care is a key part of your treatment and safety. Be sure to make and go to all appointments, and call your doctor if you are having problems. It's also a good idea to know your test results and keep a list of the medicines you take. Where can you learn more? Go to http://juan jose-cindy.info/. Enter X280 in the search box to learn more about \"Learning About Your Stroke Risk When You Have Atrial Fibrillation. \"  Current as of: September 21, 2016  Content Version: 11.4  © 4812-8859 [employer]. Care instructions adapted under license by [employer] (which disclaims liability or warranty for this information). If you have questions about a medical condition or this instruction, always ask your healthcare professional. [place] [address] any warranty or liability for your use of this information. High INR Test Result: Care Instructions  Your Care Instructions  You had a blood test to check how long it takes your blood to clot. This test is called a PT or prothrombin time test. The result of the test is called the INR level. A high INR level can happen when you take warfarin (Coumadin). Warfarin helps prevent blood clots. To do this, it slows the amount of time it takes for your blood to clot. This raises your INR level. The INR goal for people who take warfarin is usually from 2 to 3.5. A value higher than 3.5 increases the risk of bleeding problems. Many things can affect the way warfarin works. Some natural health products and other medicines can make warfarin work too well. That can raise the risk of bleeding. If you drink a lot of alcohol, that may raise your INR. And severe diarrhea or vomiting can also raise your INR. The best way to lower your INR will depend on several things.  In some cases, the doctor may have you stop taking warfarin for a few days. You may also be given other medicines to take. You will need to be tested often to make sure your INR level is going down. You will also need to watch for signs of bleeding. The doctor has checked you carefully, but problems can develop later. If you notice any problems or new symptoms, get medical treatment right away. Follow-up care is a key part of your treatment and safety. Be sure to make and go to all appointments, and call your doctor if you are having problems. It's also a good idea to know your test results and keep a list of the medicines you take. How can you care for yourself at home? Be careful with medicines and foods  · Don't start or stop taking any medicines, vitamins, or natural remedies unless you first talk to your doctor. · Keep the amount of vitamin K in your diet about the same from day to day. Do not suddenly eat a lot more or a lot less food that is rich in vitamin K than you usually do. Vitamin K affects how warfarin works and how your blood clots. · Avoid cranberry juice and other cranberry products. They can increase the effects of warfarin. · Limit your use of alcohol. Avoid bleeding by preventing falls  · Wear slippers or shoes with nonskid soles. · Remove throw rugs and clutter. · Rearrange furniture and electrical cords to keep them out of walking paths. · Keep stairways, porches, and outside walkways well lit. Use night-lights in hallways and bathrooms. · Be extra careful when you work with sharp tools or knives. When should you call for help? Call 911 anytime you think you may need emergency care. For example, call if:  · You have a sudden, severe headache that is different from past headaches. Call your doctor now or seek immediate medical care if:  · You have any abnormal bleeding, such as:  ¨ Nosebleeds.   ¨ Vaginal bleeding that is different (heavier, more frequent, at a different time of the month) than what you are used to.  ¨ Bloody or black stools, or rectal bleeding. ¨ Bloody or pink urine. Watch closely for changes in your health, and be sure to contact your doctor if you have any problems. Where can you learn more? Go to Tarari.be  Enter C178 in the search box to learn more about \"High INR Test Result: Care Instructions. \"   © 2539-2218 [employer]. Care instructions adapted under license by [facility] (which disclaims liability or warranty for this information). This care instruction is for use with your licensed healthcare professional. If you have questions about a medical condition or this instruction, always ask your healthcare professional. [place] [address] any warranty or liability for your use of this information.   Content Version: 97.8.872705; Current as of: November 20, 2015    Patient armband removed and shredded

## 2018-01-08 NOTE — PROGRESS NOTES
Chart reviewed. Pt admitted to hospital for abdominal wall hematoma. Pt has PMH of MI, A Fib, HTN, GI disorder. CM will be available for discharge planning, as needed. 1034  Met with patient at bedside. Pt states she live at her home with her son, Azul Hong. Pt states her son drives her to her appts. Pt states she does not drive. Pt denies using any DME. Pt states she has no trouble ambulating. Pt offered [facility] for HH. Pt refusing HH at this time. PCP MD Raffaele Lizarraga confirmed with pt. No needs identified at this time. CM will cont to follow. 1220  Met with patient and pts son at bedside. Offered HH again to pt. Pt aware PT recommending HH. Pt cont to refuse HH. Pts son at questions regarding plan of care. MD [place] to bedside. CM will cont to follow. Discharge Reassessment Plan:  Low Risk    RRAT Score:  1 - 12     Low Risk Care Transition Interventions:  1. Discharge transition plan:  Home with [facility] vs. Home with MD follow up  2. Involved patient/caregiver in assessment, planning, education and implement of intervention. 3. CM daily patient care huddles/interdisciplinary rounds were completed. 4. PCP/Specialist appointment within 5 days made prior to discharge. Date/Time  5. Facilitated transportation and logistics for follow-up appointments. 6. Handoff to [facility] Nurse Navigator or PCP practice. Care Management Interventions  PCP Verified by CM:  Yes  Transition of Care Consult (CM Consult): Discharge Planning

## 2018-07-18 ENCOUNTER — APPOINTMENT (OUTPATIENT)
Dept: CT IMAGING | Age: 83
DRG: 065 | End: 2018-07-18
Attending: PSYCHIATRY & NEUROLOGY
Payer: MEDICARE

## 2018-07-18 ENCOUNTER — HOSPITAL ENCOUNTER (INPATIENT)
Age: 83
LOS: 10 days | Discharge: HOME HEALTH | DRG: 065 | End: 2018-07-28
Attending: EMERGENCY MEDICINE | Admitting: INTERNAL MEDICINE
Payer: MEDICARE

## 2018-07-18 ENCOUNTER — APPOINTMENT (OUTPATIENT)
Dept: GENERAL RADIOLOGY | Age: 83
DRG: 065 | End: 2018-07-18
Attending: EMERGENCY MEDICINE
Payer: MEDICARE

## 2018-07-18 ENCOUNTER — APPOINTMENT (OUTPATIENT)
Dept: NON INVASIVE DIAGNOSTICS | Age: 83
DRG: 065 | End: 2018-07-18
Attending: INTERNAL MEDICINE
Payer: MEDICARE

## 2018-07-18 ENCOUNTER — APPOINTMENT (OUTPATIENT)
Dept: MRI IMAGING | Age: 83
DRG: 065 | End: 2018-07-18
Attending: INTERNAL MEDICINE
Payer: MEDICARE

## 2018-07-18 ENCOUNTER — APPOINTMENT (OUTPATIENT)
Dept: CT IMAGING | Age: 83
DRG: 065 | End: 2018-07-18
Attending: EMERGENCY MEDICINE
Payer: MEDICARE

## 2018-07-18 DIAGNOSIS — I48.91 ATRIAL FIBRILLATION WITH SLOW VENTRICULAR RESPONSE (HCC): Primary | ICD-10-CM

## 2018-07-18 DIAGNOSIS — R47.81 SLURRED SPEECH: ICD-10-CM

## 2018-07-18 DIAGNOSIS — R29.810 FACIAL DROOP: ICD-10-CM

## 2018-07-18 PROBLEM — I63.9 ACUTE CVA (CEREBROVASCULAR ACCIDENT) (HCC): Status: ACTIVE | Noted: 2018-07-18

## 2018-07-18 PROBLEM — I49.5 SSS (SICK SINUS SYNDROME) (HCC): Status: ACTIVE | Noted: 2018-07-18

## 2018-07-18 LAB
ALBUMIN SERPL-MCNC: 3.3 G/DL (ref 3.4–5)
ALBUMIN/GLOB SERPL: 1 {RATIO} (ref 0.8–1.7)
ALP SERPL-CCNC: 80 U/L (ref 45–117)
ALT SERPL-CCNC: 18 U/L (ref 13–56)
ANION GAP SERPL CALC-SCNC: 6 MMOL/L (ref 3–18)
APTT PPP: 29.5 SEC (ref 23–36.4)
AST SERPL-CCNC: 16 U/L (ref 15–37)
BASOPHILS # BLD: 0 K/UL (ref 0–0.1)
BASOPHILS NFR BLD: 0 % (ref 0–2)
BILIRUB SERPL-MCNC: 0.5 MG/DL (ref 0.2–1)
BUN SERPL-MCNC: 13 MG/DL (ref 7–18)
BUN/CREAT SERPL: 12 (ref 12–20)
CALCIUM SERPL-MCNC: 9.6 MG/DL (ref 8.5–10.1)
CHLORIDE SERPL-SCNC: 104 MMOL/L (ref 100–108)
CK MB CFR SERPL CALC: NORMAL % (ref 0–4)
CK MB SERPL-MCNC: <1 NG/ML (ref 5–25)
CK SERPL-CCNC: 29 U/L (ref 26–192)
CO2 SERPL-SCNC: 26 MMOL/L (ref 21–32)
CREAT SERPL-MCNC: 1.09 MG/DL (ref 0.6–1.3)
DIFFERENTIAL METHOD BLD: ABNORMAL
DIGOXIN SERPL-MCNC: <0.2 NG/ML (ref 0.9–2)
ECHO AO ARCH DIAM: 2.76 CM
ECHO AO ASC DIAM: 3.34 CM
ECHO AO ROOT DIAM: 3 CM
ECHO AV AREA PEAK VELOCITY: 2.2 CM2
ECHO AV AREA VTI: 2.3 CM2
ECHO AV AREA/BSA PEAK VELOCITY: 1.3 CM2/M2
ECHO AV AREA/BSA VTI: 1.4 CM2/M2
ECHO AV MEAN GRADIENT: 7.5 MMHG
ECHO AV PEAK GRADIENT: 12.9 MMHG
ECHO AV PEAK VELOCITY: 179.44 CM/S
ECHO AV VTI: 36.38 CM
ECHO IVC SNIFF: 1.36 CM
ECHO LA MAJOR AXIS: 3.65 CM
ECHO LA VOL 2C: 71.53 ML (ref 22–52)
ECHO LA VOL 4C: 64.13 ML (ref 22–52)
ECHO LA VOL BP: 76 ML (ref 22–52)
ECHO LA VOL/BSA BIPLANE: 46.01 ML/M2
ECHO LA VOLUME INDEX A2C: 43.3 ML/M2
ECHO LA VOLUME INDEX A4C: 38.82 ML/M2
ECHO LV EDV A2C: 49.7 ML
ECHO LV EDV A4C: 82 ML
ECHO LV EDV BP: 66.1 ML (ref 56–104)
ECHO LV EDV INDEX A4C: 49.6 ML/M2
ECHO LV EDV INDEX BP: 40 ML/M2
ECHO LV EDV NDEX A2C: 30.1 ML/M2
ECHO LV EJECTION FRACTION A2C: 75 %
ECHO LV EJECTION FRACTION A4C: 73 %
ECHO LV EJECTION FRACTION BIPLANE: 74.2 % (ref 55–100)
ECHO LV ESV A2C: 12.4 ML
ECHO LV ESV A4C: 22.2 ML
ECHO LV ESV BP: 17 ML (ref 19–49)
ECHO LV ESV INDEX A2C: 7.5 ML/M2
ECHO LV ESV INDEX A4C: 13.4 ML/M2
ECHO LV ESV INDEX BP: 10.3 ML/M2
ECHO LV INTERNAL DIMENSION DIASTOLIC: 4.08 CM (ref 3.9–5.3)
ECHO LV INTERNAL DIMENSION SYSTOLIC: 2.15 CM
ECHO LV IVSD: 0.93 CM (ref 0.6–0.9)
ECHO LV MASS 2D: 134.6 G (ref 67–162)
ECHO LV MASS INDEX 2D: 81.5 G/M2
ECHO LV POSTERIOR WALL DIASTOLIC: 0.94 CM (ref 0.6–0.9)
ECHO LVOT DIAM: 2.05 CM
ECHO LVOT PEAK GRADIENT: 5.9 MMHG
ECHO LVOT PEAK VELOCITY: 121.08 CM/S
ECHO LVOT VTI: 25.57 CM
ECHO RA AREA 4C: 19.8 CM2
ECHO RV INTERNAL DIMENSION: 3.75 CM
ECHO RV TAPSE: 2.1 CM (ref 1.5–2)
ECHO TV REGURGITANT MAX VELOCITY: 310.41 CM/S
ECHO TV REGURGITANT PEAK GRADIENT: 38.5 MMHG
EOSINOPHIL # BLD: 0.1 K/UL (ref 0–0.4)
EOSINOPHIL NFR BLD: 2 % (ref 0–5)
ERYTHROCYTE [DISTWIDTH] IN BLOOD BY AUTOMATED COUNT: 12.8 % (ref 11.6–14.5)
GLOBULIN SER CALC-MCNC: 3.3 G/DL (ref 2–4)
GLUCOSE BLD STRIP.AUTO-MCNC: 116 MG/DL (ref 70–110)
GLUCOSE SERPL-MCNC: 129 MG/DL (ref 74–99)
HBA1C MFR BLD: 5.2 % (ref 4.5–5.6)
HCT VFR BLD AUTO: 42.1 % (ref 35–45)
HGB BLD-MCNC: 13.9 G/DL (ref 12–16)
INR PPP: 1.1 (ref 0.8–1.2)
LYMPHOCYTES # BLD: 1.2 K/UL (ref 0.9–3.6)
LYMPHOCYTES NFR BLD: 17 % (ref 21–52)
MCH RBC QN AUTO: 29.9 PG (ref 24–34)
MCHC RBC AUTO-ENTMCNC: 33 G/DL (ref 31–37)
MCV RBC AUTO: 90.5 FL (ref 74–97)
MONOCYTES # BLD: 0.6 K/UL (ref 0.05–1.2)
MONOCYTES NFR BLD: 9 % (ref 3–10)
NEUTS SEG # BLD: 5 K/UL (ref 1.8–8)
NEUTS SEG NFR BLD: 72 % (ref 40–73)
PLATELET # BLD AUTO: 245 K/UL (ref 135–420)
PMV BLD AUTO: 10.3 FL (ref 9.2–11.8)
POTASSIUM SERPL-SCNC: 4 MMOL/L (ref 3.5–5.5)
PROT SERPL-MCNC: 6.6 G/DL (ref 6.4–8.2)
PROTHROMBIN TIME: 13.8 SEC (ref 11.5–15.2)
RBC # BLD AUTO: 4.65 M/UL (ref 4.2–5.3)
SODIUM SERPL-SCNC: 136 MMOL/L (ref 136–145)
TROPONIN I SERPL-MCNC: <0.02 NG/ML (ref 0–0.06)
WBC # BLD AUTO: 6.9 K/UL (ref 4.6–13.2)

## 2018-07-18 PROCEDURE — 70551 MRI BRAIN STEM W/O DYE: CPT

## 2018-07-18 PROCEDURE — 74011250636 HC RX REV CODE- 250/636: Performed by: INTERNAL MEDICINE

## 2018-07-18 PROCEDURE — 80162 ASSAY OF DIGOXIN TOTAL: CPT | Performed by: EMERGENCY MEDICINE

## 2018-07-18 PROCEDURE — 65610000006 HC RM INTENSIVE CARE

## 2018-07-18 PROCEDURE — 96374 THER/PROPH/DIAG INJ IV PUSH: CPT

## 2018-07-18 PROCEDURE — 85610 PROTHROMBIN TIME: CPT | Performed by: EMERGENCY MEDICINE

## 2018-07-18 PROCEDURE — 74011250636 HC RX REV CODE- 250/636

## 2018-07-18 PROCEDURE — 70450 CT HEAD/BRAIN W/O DYE: CPT

## 2018-07-18 PROCEDURE — 74011250637 HC RX REV CODE- 250/637

## 2018-07-18 PROCEDURE — 94762 N-INVAS EAR/PLS OXIMTRY CONT: CPT

## 2018-07-18 PROCEDURE — 82550 ASSAY OF CK (CPK): CPT | Performed by: EMERGENCY MEDICINE

## 2018-07-18 PROCEDURE — 82962 GLUCOSE BLOOD TEST: CPT

## 2018-07-18 PROCEDURE — 80053 COMPREHEN METABOLIC PANEL: CPT | Performed by: EMERGENCY MEDICINE

## 2018-07-18 PROCEDURE — 92526 ORAL FUNCTION THERAPY: CPT

## 2018-07-18 PROCEDURE — 70498 CT ANGIOGRAPHY NECK: CPT

## 2018-07-18 PROCEDURE — 85025 COMPLETE CBC W/AUTO DIFF WBC: CPT | Performed by: EMERGENCY MEDICINE

## 2018-07-18 PROCEDURE — 74011250637 HC RX REV CODE- 250/637: Performed by: PSYCHIATRY & NEUROLOGY

## 2018-07-18 PROCEDURE — 74011250636 HC RX REV CODE- 250/636: Performed by: EMERGENCY MEDICINE

## 2018-07-18 PROCEDURE — C9113 INJ PANTOPRAZOLE SODIUM, VIA: HCPCS | Performed by: INTERNAL MEDICINE

## 2018-07-18 PROCEDURE — 71045 X-RAY EXAM CHEST 1 VIEW: CPT

## 2018-07-18 PROCEDURE — 85730 THROMBOPLASTIN TIME PARTIAL: CPT | Performed by: EMERGENCY MEDICINE

## 2018-07-18 PROCEDURE — 92610 EVALUATE SWALLOWING FUNCTION: CPT

## 2018-07-18 PROCEDURE — 74011000250 HC RX REV CODE- 250: Performed by: EMERGENCY MEDICINE

## 2018-07-18 PROCEDURE — 74011636320 HC RX REV CODE- 636/320: Performed by: EMERGENCY MEDICINE

## 2018-07-18 PROCEDURE — 99285 EMERGENCY DEPT VISIT HI MDM: CPT

## 2018-07-18 PROCEDURE — 83036 HEMOGLOBIN GLYCOSYLATED A1C: CPT | Performed by: INTERNAL MEDICINE

## 2018-07-18 PROCEDURE — 93005 ELECTROCARDIOGRAM TRACING: CPT

## 2018-07-18 RX ORDER — GUAIFENESIN 100 MG/5ML
81 LIQUID (ML) ORAL DAILY
COMMUNITY
End: 2018-07-28

## 2018-07-18 RX ORDER — GUAIFENESIN 100 MG/5ML
324 LIQUID (ML) ORAL
Status: DISCONTINUED | OUTPATIENT
Start: 2018-07-18 | End: 2018-07-18 | Stop reason: SDUPTHER

## 2018-07-18 RX ORDER — LATANOPROST 50 UG/ML
1 SOLUTION/ DROPS OPHTHALMIC
COMMUNITY

## 2018-07-18 RX ORDER — CALCIUM GLUCONATE 94 MG/ML
1 INJECTION, SOLUTION INTRAVENOUS
Status: DISCONTINUED | OUTPATIENT
Start: 2018-07-18 | End: 2018-07-18 | Stop reason: ALTCHOICE

## 2018-07-18 RX ORDER — SODIUM CHLORIDE 0.9 % (FLUSH) 0.9 %
5-10 SYRINGE (ML) INJECTION AS NEEDED
Status: DISCONTINUED | OUTPATIENT
Start: 2018-07-18 | End: 2018-07-28 | Stop reason: HOSPADM

## 2018-07-18 RX ORDER — LOSARTAN POTASSIUM 100 MG/1
100 TABLET ORAL DAILY
COMMUNITY

## 2018-07-18 RX ORDER — DOPAMINE HYDROCHLORIDE 160 MG/100ML
0-20 INJECTION, SOLUTION INTRAVENOUS
Status: DISCONTINUED | OUTPATIENT
Start: 2018-07-18 | End: 2018-07-19

## 2018-07-18 RX ORDER — DILTIAZEM HYDROCHLORIDE 180 MG/1
180 CAPSULE, COATED, EXTENDED RELEASE ORAL DAILY
COMMUNITY
End: 2018-07-28

## 2018-07-18 RX ORDER — CALCIUM GLUCONATE 94 MG/ML
1 INJECTION, SOLUTION INTRAVENOUS
Status: COMPLETED | OUTPATIENT
Start: 2018-07-18 | End: 2018-07-18

## 2018-07-18 RX ORDER — ASPIRIN 300 MG/1
300 SUPPOSITORY RECTAL
Status: COMPLETED | OUTPATIENT
Start: 2018-07-18 | End: 2018-07-18

## 2018-07-18 RX ORDER — SODIUM CHLORIDE 9 MG/ML
100 INJECTION, SOLUTION INTRAVENOUS ONCE
Status: COMPLETED | OUTPATIENT
Start: 2018-07-18 | End: 2018-07-19

## 2018-07-18 RX ORDER — SODIUM CHLORIDE 0.9 % (FLUSH) 0.9 %
5-10 SYRINGE (ML) INJECTION EVERY 8 HOURS
Status: DISCONTINUED | OUTPATIENT
Start: 2018-07-18 | End: 2018-07-28 | Stop reason: HOSPADM

## 2018-07-18 RX ORDER — ATROPINE SULFATE 1 MG/ML
0.5 INJECTION, SOLUTION INTRAVENOUS
Status: COMPLETED | OUTPATIENT
Start: 2018-07-18 | End: 2018-07-18

## 2018-07-18 RX ORDER — SODIUM CHLORIDE 9 MG/ML
15 INJECTION INTRAMUSCULAR; INTRAVENOUS; SUBCUTANEOUS ONCE
Status: COMPLETED | OUTPATIENT
Start: 2018-07-18 | End: 2018-07-18

## 2018-07-18 RX ORDER — AMLODIPINE BESYLATE 5 MG/1
10 TABLET ORAL DAILY
COMMUNITY
End: 2018-07-28

## 2018-07-18 RX ORDER — HEPARIN SODIUM 5000 [USP'U]/ML
5000 INJECTION, SOLUTION INTRAVENOUS; SUBCUTANEOUS EVERY 8 HOURS
Status: DISCONTINUED | OUTPATIENT
Start: 2018-07-18 | End: 2018-07-23

## 2018-07-18 RX ORDER — WARFARIN 2.5 MG/1
2.5 TABLET ORAL DAILY
COMMUNITY
End: 2018-07-28

## 2018-07-18 RX ORDER — ATORVASTATIN CALCIUM 20 MG/1
40 TABLET, FILM COATED ORAL DAILY
Status: DISCONTINUED | OUTPATIENT
Start: 2018-07-18 | End: 2018-07-20

## 2018-07-18 RX ORDER — LEVETIRACETAM 10 MG/ML
1000 INJECTION INTRAVASCULAR EVERY 12 HOURS
Status: DISCONTINUED | OUTPATIENT
Start: 2018-07-18 | End: 2018-07-18 | Stop reason: CLARIF

## 2018-07-18 RX ORDER — ATROPINE SULFATE 0.1 MG/ML
INJECTION INTRAVENOUS
Status: COMPLETED
Start: 2018-07-18 | End: 2018-07-18

## 2018-07-18 RX ORDER — SODIUM CHLORIDE 9 MG/ML
100 INJECTION, SOLUTION INTRAVENOUS CONTINUOUS
Status: DISPENSED | OUTPATIENT
Start: 2018-07-18 | End: 2018-07-19

## 2018-07-18 RX ORDER — GUAIFENESIN 100 MG/5ML
81 LIQUID (ML) ORAL DAILY
Status: DISCONTINUED | OUTPATIENT
Start: 2018-07-19 | End: 2018-07-19

## 2018-07-18 RX ORDER — ASPIRIN 300 MG/1
SUPPOSITORY RECTAL
Status: COMPLETED
Start: 2018-07-18 | End: 2018-07-18

## 2018-07-18 RX ADMIN — SODIUM CHLORIDE 75 ML/HR: 900 INJECTION, SOLUTION INTRAVENOUS at 13:53

## 2018-07-18 RX ADMIN — HEPARIN SODIUM 5000 UNITS: 5000 INJECTION, SOLUTION INTRAVENOUS; SUBCUTANEOUS at 22:00

## 2018-07-18 RX ADMIN — ATROPINE SULFATE 1 MG: 0.1 INJECTION PARENTERAL at 09:13

## 2018-07-18 RX ADMIN — Medication 10 ML: at 16:41

## 2018-07-18 RX ADMIN — DOPAMINE HYDROCHLORIDE IN DEXTROSE 5 MCG/KG/MIN: 1.6 INJECTION, SOLUTION INTRAVENOUS at 10:37

## 2018-07-18 RX ADMIN — SODIUM CHLORIDE 15 ML: 9 INJECTION, SOLUTION INTRAMUSCULAR; INTRAVENOUS; SUBCUTANEOUS at 15:38

## 2018-07-18 RX ADMIN — CALCIUM GLUCONATE 1 G: 94 INJECTION, SOLUTION INTRAVENOUS at 10:40

## 2018-07-18 RX ADMIN — ATROPINE SULFATE 0.5 MG: 1 INJECTION, SOLUTION INTRAMUSCULAR; INTRAVENOUS; SUBCUTANEOUS at 09:26

## 2018-07-18 RX ADMIN — ATORVASTATIN CALCIUM 40 MG: 20 TABLET, FILM COATED ORAL at 16:50

## 2018-07-18 RX ADMIN — ASPIRIN 300 MG: 300 SUPPOSITORY RECTAL at 09:50

## 2018-07-18 RX ADMIN — HEPARIN SODIUM 5000 UNITS: 5000 INJECTION, SOLUTION INTRAVENOUS; SUBCUTANEOUS at 16:50

## 2018-07-18 RX ADMIN — PANTOPRAZOLE SODIUM 40 MG: 40 INJECTION, POWDER, FOR SOLUTION INTRAVENOUS at 16:51

## 2018-07-18 RX ADMIN — SODIUM CHLORIDE 100 ML/HR: 900 INJECTION, SOLUTION INTRAVENOUS at 09:30

## 2018-07-18 RX ADMIN — ATROPINE SULFATE 0.5 MG: 1 INJECTION, SOLUTION INTRAMUSCULAR; INTRAVENOUS; SUBCUTANEOUS at 09:11

## 2018-07-18 RX ADMIN — IOPAMIDOL 100 ML: 755 INJECTION, SOLUTION INTRAVENOUS at 15:48

## 2018-07-18 NOTE — PROGRESS NOTES
Occupational Therapy Screening:  Services are indicated. Evaluate and Treat Order is requested. [facility] therapy based on results obtained during the nursing admission assessment. The patients chart was reviewed and the patient is appropriate for a skilled therapy evaluation. Patient admitted d/t L sided weakness and slurred speech. Pt normally walks and performs ADLs. (Pt lives w/ son). PLEASE use Stroke order set for therapy to assess patient. Thank you.   Tierney Montaño, OTR/L

## 2018-07-18 NOTE — CONSULTS
NEUROLOGY ER CONSULTATION NOTE    Patient: Riki Dos Santos MRN: [id number]  CSN: [id number]    YOB: 1930  Age: 80 y.o. Sex: female    DOA: 7/18/2018 LOS:  LOS: 0 days        Requesting Physician: Dr. Murtaza Weems  Reason for Consultation: stroke         HISTORY OF PRESENT ILLNESS:   Riki Dos Santos is a 70-year-old right-handed female with history of hypertension, hyperlipidemia, CAD/MI and atrial fibrillation (on aspirin only), who presented with left sided weakness. The patient seems to have woken up with the symptoms. The onset is not clear but may be yesterday evening. According to her daughter and granddaughter, the symptoms were recognized in the morning hours. The patient had left-sided upper and lower extremity weakness, slurred speech and left facial droop. She was seen by teleneurology was not regarded to be a tPA candidate. Her head CT didnt show any acute events. PAST MEDICAL HISTORY:  Past Medical History:   Diagnosis Date    Arthritis     Atrial fibrillation ([address]     CAD (coronary artery disease)     Edema     Gastrointestinal disorder     High cholesterol     Hypertension     Hypoglycemia     Hypoglycemia     IBS (irritable bowel syndrome)     MI, old      PAST SURGICAL HISTORY:  Past Surgical History:   Procedure Laterality Date    HX CHOLECYSTECTOMY      HX KNEE REPLACEMENT      HX ORTHOPAEDIC      Left TKR     FAMILY HISTORY:  History reviewed. No pertinent family history.   SOCIAL HISTORY:  Social History     Social History    Marital status:      Spouse name: N/A    Number of children: N/A    Years of education: N/A     Social History Main Topics    Smoking status: Never Smoker    Smokeless tobacco: None    Alcohol use 0.5 oz/week     1 Glasses of wine per week      Comment: every few weeks    Drug use: None    Sexual activity: Not Asked     Other Topics Concern    None     Social History Narrative     MEDICATIONS:  Current Facility-Administered Medications   Medication Dose Route Frequency    DOPamine (INTROPIN) 400 mg in dextrose 5% 250 mL infusion  0-20 mcg/kg/min IntraVENous TITRATE    sodium chloride (NS) flush 5-10 mL  5-10 mL IntraVENous Q8H    sodium chloride (NS) flush 5-10 mL  5-10 mL IntraVENous PRN    0.9% sodium chloride infusion  75 mL/hr IntraVENous CONTINUOUS    [START ON 7/19/2018] aspirin chewable tablet 81 mg  81 mg Oral DAILY    heparin (porcine) injection 5,000 Units  5,000 Units SubCUTAneous Q8H     Current Outpatient Prescriptions   Medication Sig    dilTIAZem CD (CARDIZEM CD) 180 mg ER capsule Take 180 mg by mouth daily.  amLODIPine (NORVASC) 5 mg tablet Take 10 mg by mouth daily.  losartan (COZAAR) 100 mg tablet Take 100 mg by mouth daily.  aspirin 81 mg chewable tablet Take 81 mg by mouth daily.  latanoprost (XALATAN) 0.005 % ophthalmic solution Administer 1 Drop to both eyes nightly.  omeprazole (PRILOSEC) 20 mg capsule Take 20 mg by mouth daily.  METOCLOPRAMIDE HCL (REGLAN PO) Take 10 mg by mouth daily.  ATORVASTATIN CALCIUM (LIPITOR PO) Take 10 mg by mouth daily.  warfarin (COUMADIN) 2.5 mg tablet Take 2.5 mg by mouth daily. Prior to Admission medications    Medication Sig Start Date End Date Taking? Authorizing Provider   dilTIAZem CD (CARDIZEM CD) 180 mg ER capsule Take 180 mg by mouth daily. Yes Reinaldo Other, MD   amLODIPine (NORVASC) 5 mg tablet Take 10 mg by mouth daily. Yes Phys Other, MD   losartan (COZAAR) 100 mg tablet Take 100 mg by mouth daily. Yes Phys Other, MD   aspirin 81 mg chewable tablet Take 81 mg by mouth daily. Yes Phys Other, MD   latanoprost (XALATAN) 0.005 % ophthalmic solution Administer 1 Drop to both eyes nightly. Yes Phys Other, MD   omeprazole (PRILOSEC) 20 mg capsule Take 20 mg by mouth daily. Yes Historical Provider   METOCLOPRAMIDE HCL (REGLAN PO) Take 10 mg by mouth daily.    Yes Phys Other, MD   ATORVASTATIN CALCIUM (LIPITOR PO) Take 10 mg by mouth daily. Yes Phys Other, MD   warfarin (COUMADIN) 2.5 mg tablet Take 2.5 mg by mouth daily. Phys Other, MD       ALLERGIES:  Allergies   Allergen Reactions    Amoxicillin Rash       Review of Systems  GENERAL: No fevers or chills. HEENT: No change in vision, earache, tinnitus, sore throat or sinus congestion. NECK: No pain or stiffness. CARDIOVASCULAR: No chest pain or pressure. No palpitations. PULMONARY: No shortness of breath, cough or wheeze. GASTROINTESTINAL: No abdominal pain, nausea, vomiting or diarrhea. GENITOURINARY: No urinary frequency, urgency, hesitancy or dysuria. MUSCULOSKELETAL: No joint or muscle pain, no back pain, no recent trauma. DERMATOLOGIC: No rash, no itching, no lesions. ENDOCRINE: No polyuria, polydipsia, no heat or cold intolerance. No recent change in weight. HEMATOLOGICAL: No anemia or easy bruising or bleeding. NEUROLOGIC: No headache, seizures,. See HPI. PHYSICAL EXAMINATION:     Visit Vitals    /67    Pulse 62    Temp 98 °F (36.7 °C)    Resp 13    Ht 5' (1.524 m)    Wt 68 kg (150 lb)    SpO2 96%    BMI 29.29 kg/m2      O2 Device: Room air  GENERAL: Pleasant, in no apparent distress. HEENT: Moist mucous membranes, sclerae anicteric, scalp is atraumatic. CVS: Regular rate and rhythm, no murmurs or gallops. No carotid bruits. PULMONARY: Clear to auscultation bilaterally. No rales or rhonchi. No wheezing. EXTREMITIES: Normal range of motion at all sites. No deformities. ABDOMEN: Soft, nontender. SKIN: No rashes or ecchymoses. Warm and dry. NEUROLOGIC: Alert and oriented x3. Speech is dysarthric. There is no aphasia. She's able to name the objects and talk intelligibly. There seems to be left-sided neglect to double simultaneous stimuli  Cranial nerves: Face: left facial weakness Facial sensation is intact. Extraocular movements are intact with no nystagmus. Visual fields are full to confrontation. PERRL. Tongue is midline.  Palate elevates symmetrically. Hearing intact to speech. Sternocleidomastoid and trapezius strengths are full bilaterally. Motor: Left arm>>leg weakness, orbitting on left. Full strength on the left side  Sensory: decreased sensation to pinprick on the left side. There is some neglect on the left. Coordination: Intact coordination with finger-nose-finger bilaterally. Normal fine movements. No bradykinesia detected. Deep tendon reflexes: 2+ at biceps, brachioradialis, patella and ankles bilaterally. Labs: Results:       Chemistry Recent Labs      07/18/18   0850   GLU  129*   NA  136   K  4.0   CL  104   CO2  26   BUN  13   CREA  1.09   CA  9.6   AGAP  6   BUCR  12   AP  80   TP  6.6   ALB  3.3*   GLOB  3.3   AGRAT  1.0      CBC w/Diff Recent Labs      07/18/18   0850   WBC  6.9   RBC  4.65   HGB  13.9   HCT  42.1   PLT  245   GRANS  72   LYMPH  17*   EOS  2      Cardiac Enzymes Recent Labs      07/18/18   0850   CPK  29   CKND1  CALCULATION NOT PERFORMED WHEN RESULT IS BELOW LINEAR LIMIT      Coagulation Recent Labs      07/18/18   0850   PTP  13.8   INR  1.1   APTT  29.5       Lipid Panel Lab Results   Component Value Date/Time    Cholesterol, total 147 04/12/2013 09:30 PM    HDL Cholesterol 56 04/12/2013 09:30 PM    LDL, calculated 75.6 04/12/2013 09:30 PM    VLDL, calculated 15.4 04/12/2013 09:30 PM    Triglyceride 77 04/12/2013 09:30 PM    CHOL/HDL Ratio 2.6 04/12/2013 09:30 PM      BNP No results for input(s): BNPP in the last 72 hours. Liver Enzymes Recent Labs      07/18/18   0850   TP  6.6   ALB  3.3*   AP  80   SGOT  16      Thyroid Studies Lab Results   Component Value Date/Time    TSH 5.51 (H) 12/10/2014 08:53 PM          Radiology:  Ct Head Wo Cont    Result Date: 7/18/2018  EXAM: CT head INDICATION: Left-sided weakness, slurred speech since 8:00 AM. CODE S. COMPARISON: 04/12/13.  TECHNIQUE: Axial CT imaging of the head  was obtained from skull base to vertex without intravenous contrast. One or more dose reduction techniques were used on this CT: automated exposure control, adjustment of the mAs and/or kVp according to patient's size, and iterative reconstruction techniques. The specific techniques utilized on this CT exam have been documented in the patient's electronic medical record. _______________ FINDINGS: BRAIN AND POSTERIOR FOSSA: The sulci, folia, ventricles and basal cisterns are mildly prominent. Patchy periventricular, subcortical and deep white matter hypodensities. Stable well-circumscribed hypodensity in the right basal ganglia. . No acute intracranial hemorrhage, cortical infarct or mass effect/mass lesion. EXTRA-AXIAL SPACES AND MENINGES: There are no abnormal extra-axial fluid collections. CALVARIUM: No acute osseous abnormality. Eriberto Plough SINUSES: The visualized portions of the paranasal sinuses and mastoid air cells are well aerated. OTHER: None. _______________     IMPRESSION: 1. Progressive white matter findings, nonspecific but typically reflecting progressive chronic ischemic change in a patient of this age. Stable right basal ganglia lacunar infarct. Mild parenchymal volume loss/atrophy. 2. No CT evidence of acute intracranial hematoma, cortical infarct, or mass effect/mass lesion. Please note that noncontrast head CT can be negative in the setting of acute/subacute ischemia/infarct, and in the high index of clinical suspicion, MRI could best further assess. Critical result call to Dr. Katy Aviles in ER, at 8:50 AM on 07/18/18, as per stroke alert/CODE S protocol. Xr Chest Port    Result Date: 7/18/2018  CLINICAL: Left-sided weakness. Cardiac arrhythmia. COMPARISON: January 5, 2018. A portable view of the chest from 0954 hours: Lung fields appear clear. Minimal blunting left costophrenic angle. No focal airspace disease. Thoracolumbar scoliosis. Mediastinal silhouette stable. Atherosclerotic calcifications in the aortic arch . Impression: Possible small left pleural effusion.  Otherwise unremarkable    ASSESSMENT/IMPRESSION:  The clinical presentation is suggestive of right hemispheric stroke, possibly involving the parietal lobe. Looks to be secondary to cardioembolic etiology. The patient needs to be on aspirin for now until the patient undergoes full stroke workup    RECOMMENDATIONS:  1. Aspirin 325mg po now, and then Aspirin 81mg po daily afterwards. 2. Admit to ICU with tele monitoring  3. Neuro checks per stroke protocol  4. Permissive hypertension (do not treat if BP is not >200/110, prefer prn labetolol 5mg)  5. IV normal saline continuous infusion 100-125 ml/h  6. Euglycemia with sliding scale insulin  7. Euthermia with acetaminophen 650mg Q6h prn for fever  8. Avoid urinary catheters please. 9. MRI without contrast  10. CTA of head and neck  11. Echocardiogram with bubble study  12. Lipid panel  13. Hemoglobin A1c  14. SCDs and DVT prophylaxis    I will follow the patient.  Please do not hesitate to return with any questions.    ------------------------------------  Charli Gibson MD  7/18/2018  2:20 PM

## 2018-07-18 NOTE — CONSULTS
[facility]  Pulmonary, Critical Care, and Sleep Medicine    Initial Patient Consult    Name: Bryson Lynch MRN: [id number]   : 1930 Hospital: [facility] [place]   Date: 2018  Room: [place] [address]     Subjective: This patient has been seen and evaluated at the request of Dr. Genevieve Butler for icu admission for stroke patient; slow Afib; need for pressor. Patient is a 80 y. o. female with hx of HTN, hyperlipidemia, CAD/MI and atrial fibrillation (on aspirin only, not taking coumadin), who presented with left sided upper and lower extremity weakness, slurred speech and left facial droop. The patient was evaluated by tele neurology, onset was not clear and not given tPA. CT head nil acute. No cough or SOB or chest pains. Patient was bradycardic with slow Afib and hypotensive - started on dopamine which is at 5 mcg/kg/min.       Past Medical History:   Diagnosis Date    Arthritis     Atrial fibrillation (HCC)     CAD (coronary artery disease)     Edema     Gastrointestinal disorder     High cholesterol     Hypertension     Hypoglycemia     Hypoglycemia     IBS (irritable bowel syndrome)     MI, old       Past Surgical History:   Procedure Laterality Date    HX CHOLECYSTECTOMY      HX KNEE REPLACEMENT      HX ORTHOPAEDIC      Left TKR      Prior to Admission medications    Medication Sig Start Date End Date Taking? Authorizing Provider   dilTIAZem CD (CARDIZEM CD) 180 mg ER capsule Take 180 mg by mouth daily. Yes Reinaldo Cordero MD   amLODIPine (NORVASC) 5 mg tablet Take 10 mg by mouth daily. Yes Reinaldo Cordero MD   losartan (COZAAR) 100 mg tablet Take 100 mg by mouth daily. Yes Reinaldo Cordero MD   aspirin 81 mg chewable tablet Take 81 mg by mouth daily. Yes Reinaldo Cordero MD   latanoprost (XALATAN) 0.005 % ophthalmic solution Administer 1 Drop to both eyes nightly. Yes Reinaldo Cordero MD   omeprazole (PRILOSEC) 20 mg capsule Take 20 mg by mouth daily.    Yes Historical Provider   METOCLOPRAMIDE HCL (REGLAN PO) Take 10 mg by mouth daily. Yes Reinaldo Other, MD   ATORVASTATIN CALCIUM (LIPITOR PO) Take 10 mg by mouth daily. Yes Reinaldo Other, MD   warfarin (COUMADIN) 2.5 mg tablet Take 2.5 mg by mouth daily. Phys Other, MD     Allergies   Allergen Reactions    Amoxicillin Rash      Social History   Substance Use Topics    Smoking status: Never Smoker    Smokeless tobacco: Not on file    Alcohol use 0.5 oz/week     1 Glasses of wine per week      Comment: every few weeks      History reviewed. No pertinent family history.      Current Facility-Administered Medications   Medication Dose Route Frequency    DOPamine (INTROPIN) 400 mg in dextrose 5% 250 mL infusion  0-20 mcg/kg/min IntraVENous TITRATE    sodium chloride (NS) flush 5-10 mL  5-10 mL IntraVENous Q8H    0.9% sodium chloride infusion  75 mL/hr IntraVENous CONTINUOUS    [START ON 2018] aspirin chewable tablet 81 mg  81 mg Oral DAILY    heparin (porcine) injection 5,000 Units  5,000 Units SubCUTAneous Q8H    atorvastatin (LIPITOR) tablet 40 mg  40 mg Oral DAILY    0.9% NaCl bacteriostatic (NORMAL SALINE) 0.9 % injection 15 mL  15 mL IntraVENous ONCE       Latest lactic acid:   Lactic acid   Date Value Ref Range Status   2012 1.3 0.4 - 2.0 MMOL/L Final       Review of Systems:  Limited due to patient condition     Objective:   Vital Signs:    Visit Vitals    /73    Pulse 66    Temp 98 °F (36.7 °C)    Resp 17    Ht 5' (1.524 m)    Wt 68 kg (150 lb)    SpO2 96%    BMI 29.29 kg/m2       O2 Device: Room air       Temp (24hrs), Av °F (36.7 °C), Min:98 °F (36.7 °C), Max:98 °F (36.7 °C)       Intake/Output:   Last shift:         Last 3 shifts:    No intake or output data in the 24 hours ending 18 1440        Physical Exam:   Comfortable; on room air; acyanotic  HEENT: pupils not dilated, reactive, no scleral jaundice  Neck: No adenopathy or thyroid swelling  CVS: S1S2 no murmurs; JVD not elevated  RS: Mod air entry bilaterally, no wheezes or crackles  Abd: soft, non tender, no hepatosplenomegaly, no abd distension  Neuro: awake, alert, left facial droop and mild left sided neglect; left UE strength 3+ and left LE strength 4+  Extrm: no leg edema or swelling or clubbing  Skin: no rash  Lymphatic: no cervical or supraclavicular adenopathy  Psych: mildly anxious    Telemetry: AFIB rate 60s      Data review:     Recent Results (from the past 24 hour(s))   CBC WITH AUTOMATED DIFF    Collection Time: 07/18/18  8:50 AM   Result Value Ref Range    WBC 6.9 4.6 - 13.2 K/uL    RBC 4.65 4.20 - 5.30 M/uL    HGB 13.9 12.0 - 16.0 g/dL    HCT 42.1 35.0 - 45.0 %    MCV 90.5 74.0 - 97.0 FL    MCH 29.9 24.0 - 34.0 PG    MCHC 33.0 31.0 - 37.0 g/dL    RDW 12.8 11.6 - 14.5 %    PLATELET 782 063 - 026 K/uL    MPV 10.3 9.2 - 11.8 FL    NEUTROPHILS 72 40 - 73 %    LYMPHOCYTES 17 (L) 21 - 52 %    MONOCYTES 9 3 - 10 %    EOSINOPHILS 2 0 - 5 %    BASOPHILS 0 0 - 2 %    ABS. NEUTROPHILS 5.0 1.8 - 8.0 K/UL    ABS. LYMPHOCYTES 1.2 0.9 - 3.6 K/UL    ABS. MONOCYTES 0.6 0.05 - 1.2 K/UL    ABS. EOSINOPHILS 0.1 0.0 - 0.4 K/UL    ABS. BASOPHILS 0.0 0.0 - 0.1 K/UL    DF AUTOMATED     METABOLIC PANEL, COMPREHENSIVE    Collection Time: 07/18/18  8:50 AM   Result Value Ref Range    Sodium 136 136 - 145 mmol/L    Potassium 4.0 3.5 - 5.5 mmol/L    Chloride 104 100 - 108 mmol/L    CO2 26 21 - 32 mmol/L    Anion gap 6 3.0 - 18 mmol/L    Glucose 129 (H) 74 - 99 mg/dL    BUN 13 7.0 - 18 MG/DL    Creatinine 1.09 0.6 - 1.3 MG/DL    BUN/Creatinine ratio 12 12 - 20      GFR est AA 57 (L) >60 ml/min/1.73m2    GFR est non-AA 47 (L) >60 ml/min/1.73m2    Calcium 9.6 8.5 - 10.1 MG/DL    Bilirubin, total 0.5 0.2 - 1.0 MG/DL    ALT (SGPT) 18 13 - 56 U/L    AST (SGOT) 16 15 - 37 U/L    Alk.  phosphatase 80 45 - 117 U/L    Protein, total 6.6 6.4 - 8.2 g/dL    Albumin 3.3 (L) 3.4 - 5.0 g/dL    Globulin 3.3 2.0 - 4.0 g/dL    A-G Ratio 1.0 0.8 - 1.7 PTT    Collection Time: 07/18/18  8:50 AM   Result Value Ref Range    aPTT 29.5 23.0 - 36.4 SEC   PROTHROMBIN TIME + INR    Collection Time: 07/18/18  8:50 AM   Result Value Ref Range    Prothrombin time 13.8 11.5 - 15.2 sec    INR 1.1 0.8 - 1.2     CARDIAC PANEL,(CK, CKMB & TROPONIN)    Collection Time: 07/18/18  8:50 AM   Result Value Ref Range    CK 29 26 - 192 U/L    CK - MB <1.0 <3.6 ng/ml    CK-MB Index  0.0 - 4.0 %     CALCULATION NOT PERFORMED WHEN RESULT IS BELOW LINEAR LIMIT    Troponin-I, Qt. <0.02 0.00 - 0.06 NG/ML   DIGOXIN    Collection Time: 07/18/18  8:50 AM   Result Value Ref Range    Digoxin level <0.2 (L) 0.9 - 2.0 NG/ML   EKG, 12 LEAD, INITIAL    Collection Time: 07/18/18  8:58 AM   Result Value Ref Range    Ventricular Rate 43 BPM    Atrial Rate 74 BPM    QRS Duration 90 ms    Q-T Interval 472 ms    QTC Calculation (Bezet) 398 ms    Calculated R Axis -32 degrees    Calculated T Axis 133 degrees    Diagnosis       Atrial fibrillation with slow ventricular response with premature ventricular   or aberrantly conducted complexes  Left axis deviation  Anteroseptal infarct (cited on or before 08-FEB-2013)  ST & T wave abnormality, consider lateral ischemia or digitalis effect  Abnormal ECG  When compared with ECG of 05-JAN-2018 10:17,  Vent. rate has decreased BY  22 BPM  T wave inversion more evident in Lateral leads             No results for input(s): FIO2I, IFO2, HCO3I, IHCO3, HCOPOC, PCO2I, PCOPOC, IPHI, PHI, PHPOC, PO2I, PO2POC in the last 72 hours. No lab exists for component: IPOC2    All Micro Results     None            ECHO pending      Imaging:  [x]I have personally reviewed the patients chest radiographs images and report       Results from Hospital Encounter encounter on 07/18/18   XR CHEST PORT   Narrative CLINICAL: Left-sided weakness. Cardiac arrhythmia. COMPARISON: January 5, 2018. A portable view of the chest from 0954 hours:    Lung fields appear clear.  Minimal blunting left costophrenic angle. No focal  airspace disease. Thoracolumbar scoliosis. Mediastinal silhouette stable. Atherosclerotic calcifications in the aortic arch . Impression Impression:    Possible small left pleural effusion. Otherwise unremarkable          Results from Hospital Encounter encounter on 07/18/18   CT HEAD WO CONT   Narrative EXAM: CT head     INDICATION: Left-sided weakness, slurred speech since 8:00 AM. CODE S.    COMPARISON: 04/12/13. TECHNIQUE: Axial CT imaging of the head  was obtained from skull base to vertex  without intravenous contrast.    One or more dose reduction techniques were used on this CT: automated exposure  control, adjustment of the mAs and/or kVp according to patient's size, and  iterative reconstruction techniques. The specific techniques utilized on this CT  exam have been documented in the patient's electronic medical record.    _______________    FINDINGS:    BRAIN AND POSTERIOR FOSSA: The sulci, folia, ventricles and basal cisterns are  mildly prominent. Patchy periventricular, subcortical and deep white matter  hypodensities. Stable well-circumscribed hypodensity in the right basal  ganglia. . No acute intracranial hemorrhage, cortical infarct or mass effect/mass  lesion. EXTRA-AXIAL SPACES AND MENINGES: There are no abnormal extra-axial fluid  collections. CALVARIUM: No acute osseous abnormality. Kent Rm SINUSES: The visualized portions of the paranasal sinuses and mastoid air cells  are well aerated. OTHER: None.    _______________         Impression IMPRESSION:    1. Progressive white matter findings, nonspecific but typically reflecting  progressive chronic ischemic change in a patient of this age. Stable right basal  ganglia lacunar infarct. Mild parenchymal volume loss/atrophy. 2. No CT evidence of acute intracranial hematoma, cortical infarct, or mass  effect/mass lesion.      Please note that noncontrast head CT can be negative in the setting of  acute/subacute ischemia/infarct, and in the high index of clinical suspicion,  MRI could best further assess. Critical result call to Dr. Navdeep Burnett in ER, at 8:50 AM on 07/18/18, as per stroke  alert/CODE S protocol. IMPRESSION:   · Principal Problem:  ·   Acute CVA (cerebrovascular accident) ([facility] (7/18/2018)  ·   · Active Problems:  ·   CAD (coronary artery disease) (1/5/2018)  ·   ·   Hypercholesterolemia (1/5/2018)  ·   ·   Atrial fibrillation with slow ventricular response ([facility] (7/18/2018)  ·   ·   SSS (sick sinus syndrome) ([facility] (7/18/2018)  ·   · HTN      RECOMMENDATIONS:   · Pulm: stable respirations; watch for aspirations; elevate head end up  · Cardiac: permissive HTN per Neurology and keep SBP>120/60 mmhg; currently on dopamine drip; may need central line  · Neuro: stroke work up in progress; asa has been given; on statin  · Labs: INR normal; Dig level negative  · Fluids: NS 75/hr  · ID: no active issues  · Renal: watch IOs and renal fn  · GI: NPO for now; SLP eval per stroke protocol  · Hem: stable Hb and platelets  · Endo: poc glucose  · Proph:  DVT proph SCDs; GI proph PPI  · D/w family - daughter, grand daughter and patient - updated; awaiting icu bed  Will defer respective systems problem management to primary and other consultant and follow patient in ICU with primary and other medical team  Further recommendations will be based on the patient's response to recommended treatment and results of the investigation ordered. Quality Care: PPI, DVT prophylaxis, HOB elevated, Infection control all reviewed and addressed.   PAIN AND SEDATION: none   · Skin/Wound: no active issues  · Nutrition: NPO for now  · Prophylaxis: DVT and GI Prophylaxis reviewed  · Restraints: none  · PT/OT eval and treat: as needed  · Lines/Tubes: PIVs  ADVANCE DIRECTIVE: Full Code    · Thank you for the consult Dr Clover Croft     Mod-High complexity decision making was performed in this consultation and evaluation of this patient who is at high risk for decompensation with multiple organ involvement         Doris Garcia MD

## 2018-07-18 NOTE — H&P
History & Physical 
 
Patient: Real Alejandra MRN: [id number]  CSN: [id number] YOB: 1930  Age: 80 y.o. Sex: female DOA: 7/18/2018 Primary Care Provider:  Ryan Galindo MD 
 
 
Assessment/Plan Patient Active Problem List  
Diagnosis Code  Abdominal pain, other specified site R10.9  Atrial fibrillation (HCC) I48.91  
 Constipation K59.00  
 Encephalopathy, unspecified G93.40  Abdominal wall hematoma S30. [facility] [place]  CAD (coronary artery disease) I25.10  Hypercholesterolemia E78.00  Hypertension I10  
 Hx of myocardial infarction I25.2  Elevated INR R79.1  Atrial fibrillation with slow ventricular response [facility]) I48.91  
 
 
81 y/o female here with CVA and some degree of bradycardia. Hx of atrial fibrillation and anticoagulation. INR 1.1. Currently with left sided weakness, decreased sensation, and dysarthric speech. Cardiology and neurology following. Started on Dopamine infusion. Family present. Plan of care reviewed. 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 Atrial fibrillation. Cnronic anticoagulation with subtherapeutic INR. HTN Dyslipidmia. CAD Resp: Stable. Inf: No ID issues. Cardio: Bradycardia. SSS? Slow afib? Per cardio. Might need PPM. Heme: Warfarin sub-therapeutic. Dvt px in meantime with heparin. Await AC recs. Resume Coumadin? ? 
[place]: No metabolic issues. Svetlana: SLP eval.  Await diet recs. Neuro: CVA. Neuro consult.  
 
 
-Admit with CVA order sets. 
-Neurology consultation. Await antiplatelet and anticoagulation recs. -CVA w/u. 
-IVF. 
-PT/OT/SLP 
-Cardiology w/u for bradycardia. R/o SSS. -DVT px. 
 
 
55 mins of critical care time spent in the direct evaluation and treatment of this patient with multiple potentially life threatening medical issues. High risk for decline and or death. To be admitted to the icu for care. High complexity decision making utilized. CC: slurred speech HPI: Sandra Díaz is a 80 y.o. female who presents to the ER with left sided weakness, slurred speech and left side facial droop. Hx a-fib. Unclear when sx started. Might have woken up with sx. Heart rate noted to be low. Code S. Started on Dopamine gtt. Neuro/Cardio consulted. Asked to admit to the icu for continuation of care. Seen in ER 1. Facial droop, left weakness and slurred speech remain the same. Past Medical History:  
Diagnosis Date  Arthritis  Atrial fibrillation ([address]  CAD (coronary artery disease)  Edema  Gastrointestinal disorder  High cholesterol  Hypertension  Hypoglycemia  Hypoglycemia  IBS (irritable bowel syndrome)  MI, old Past Surgical History:  
Procedure Laterality Date  HX CHOLECYSTECTOMY  HX KNEE REPLACEMENT    
 HX ORTHOPAEDIC Left TKR History reviewed. No pertinent family history. Social History Social History  Marital status:  Spouse name: N/A  
 Number of children: N/A  
 Years of education: N/A Social History Main Topics  Smoking status: Never Smoker  Smokeless tobacco: None  Alcohol use 0.5 oz/week 1 Glasses of wine per week Comment: every few weeks  Drug use: None  Sexual activity: Not Asked Other Topics Concern  None Social History Narrative Prior to Admission medications Medication Sig Start Date End Date Taking? Authorizing Provider  
dilTIAZem CD (CARDIZEM CD) 180 mg ER capsule Take 180 mg by mouth daily. Yes Reinaldo Cordero MD  
amLODIPine (NORVASC) 5 mg tablet Take 10 mg by mouth daily. Yes Reinaldo Cordero MD  
losartan (COZAAR) 100 mg tablet Take 100 mg by mouth daily. Yes Reinaldo Cordero MD  
aspirin 81 mg chewable tablet Take 81 mg by mouth daily. Yes Reinaldo Cordero MD  
latanoprost (XALATAN) 0.005 % ophthalmic solution Administer 1 Drop to both eyes nightly.    Yes Reinaldo Cordero MD  
omeprazole (PRILOSEC) 20 mg capsule Take 20 mg by mouth daily. Yes Historical Provider METOCLOPRAMIDE HCL (REGLAN PO) Take 10 mg by mouth daily. Yes Reinaldo Cordero MD  
ATORVASTATIN CALCIUM (LIPITOR PO) Take 10 mg by mouth daily. Yes Reinaldo Cordero MD  
warfarin (COUMADIN) 2.5 mg tablet Take 2.5 mg by mouth daily. Reinaldo Cordero MD  
 
 
Allergies Allergen Reactions  Amoxicillin Rash Review of Systems Gen: No fever, chills, malaise, weight loss/gain. Heent: No headache, rhinorrhea, epistaxis, ear pain, hearing loss, sinus pain, neck pain/stiffness, sore throat. Heart: No chest pain, palpitations, NEIL, pnd, or orthopnea. Resp: No cough, hemoptysis, wheezing and shortness of breath. GI: No nausea, vomiting, diarrhea, constipation, melena or hematochezia. : No urinary obstruction, dysuria or hematuria. Derm: No rash, new skin lesion or pruritis. Musc/skeletal: no bone or joint complains. Vasc: No edema, cyanosis or claudication. Endo: No heat/cold intolerance, no polyuria,polydipsia or polyphagia. Neuro: see hpi Heme: No easy bruising or bleeding. Physical Exam:  
 
Physical Exam: 
Visit Vitals  /67  Pulse 62  Temp 98 °F (36.7 °C)  Resp 13  Ht 5' (1.524 m)  Wt 68 kg (150 lb)  SpO2 96%  BMI 29.29 kg/m2 O2 Device: Room air Temp (24hrs), Av °F (36.7 °C), Min:98 °F (36.7 °C), Max:98 °F (36.7 °C) General:  Awake, cooperative, no distress. Head:  Normocephalic, without obvious abnormality, atraumatic. Eyes:  Conjunctivae/corneas clear, sclera anicteric, PERRL, EOMs intact. Nose: Nares normal. No drainage or sinus tenderness. Throat: Lips, mucosa, and tongue normal.   
Neck: Supple, symmetrical, trachea midline, no adenopathy. Lungs:   Clear to auscultation bilaterally. Heart:  s1s2 sofia Abdomen: Soft, non-tender. Bowel sounds normal. No masses,  No organomegaly. Extremities: Extremities normal, atraumatic, no cyanosis or edema.  Capillary refill normal.  
Pulses: 2+ and symmetric all extremities. Skin: Skin color pink, turgor normal. No rashes or lesions Neurologic: Left facial droop. Left arm and leg weaknesss 4/5. Right side 5/5. Sensation diminished on left side though present. No pronator drift. Awake and alert. Oriented. Labs Reviewed: All lab results for the last 24 hours reviewed. Recent Results (from the past 24 hour(s)) CBC WITH AUTOMATED DIFF Collection Time: 07/18/18  8:50 AM  
Result Value Ref Range WBC 6.9 4.6 - 13.2 K/uL  
 RBC 4.65 4.20 - 5.30 M/uL  
 HGB 13.9 12.0 - 16.0 g/dL HCT 42.1 35.0 - 45.0 % MCV 90.5 74.0 - 97.0 FL  
 MCH 29.9 24.0 - 34.0 PG  
 MCHC 33.0 31.0 - 37.0 g/dL  
 RDW 12.8 11.6 - 14.5 % PLATELET 860 571 - 520 K/uL MPV 10.3 9.2 - 11.8 FL  
 NEUTROPHILS 72 40 - 73 % LYMPHOCYTES 17 (L) 21 - 52 % MONOCYTES 9 3 - 10 % EOSINOPHILS 2 0 - 5 % BASOPHILS 0 0 - 2 %  
 ABS. NEUTROPHILS 5.0 1.8 - 8.0 K/UL  
 ABS. LYMPHOCYTES 1.2 0.9 - 3.6 K/UL  
 ABS. MONOCYTES 0.6 0.05 - 1.2 K/UL  
 ABS. EOSINOPHILS 0.1 0.0 - 0.4 K/UL  
 ABS. BASOPHILS 0.0 0.0 - 0.1 K/UL  
 DF AUTOMATED METABOLIC PANEL, COMPREHENSIVE Collection Time: 07/18/18  8:50 AM  
Result Value Ref Range Sodium 136 136 - 145 mmol/L Potassium 4.0 3.5 - 5.5 mmol/L Chloride 104 100 - 108 mmol/L  
 CO2 26 21 - 32 mmol/L Anion gap 6 3.0 - 18 mmol/L Glucose 129 (H) 74 - 99 mg/dL BUN 13 7.0 - 18 MG/DL Creatinine 1.09 0.6 - 1.3 MG/DL  
 BUN/Creatinine ratio 12 12 - 20 GFR est AA 57 (L) >60 ml/min/1.73m2 GFR est non-AA 47 (L) >60 ml/min/1.73m2 Calcium 9.6 8.5 - 10.1 MG/DL Bilirubin, total 0.5 0.2 - 1.0 MG/DL  
 ALT (SGPT) 18 13 - 56 U/L  
 AST (SGOT) 16 15 - 37 U/L Alk. phosphatase 80 45 - 117 U/L Protein, total 6.6 6.4 - 8.2 g/dL Albumin 3.3 (L) 3.4 - 5.0 g/dL Globulin 3.3 2.0 - 4.0 g/dL A-G Ratio 1.0 0.8 - 1.7 PTT  Collection Time: 07/18/18  8:50 AM  
Result Value Ref Range aPTT 29.5 23.0 - 36.4 SEC PROTHROMBIN TIME + INR Collection Time: 07/18/18  8:50 AM  
Result Value Ref Range Prothrombin time 13.8 11.5 - 15.2 sec INR 1.1 0.8 - 1.2 CARDIAC PANEL,(CK, CKMB & TROPONIN) Collection Time: 07/18/18  8:50 AM  
Result Value Ref Range CK 29 26 - 192 U/L  
 CK - MB <1.0 <3.6 ng/ml CK-MB Index  0.0 - 4.0 % CALCULATION NOT PERFORMED WHEN RESULT IS BELOW LINEAR LIMIT Troponin-I, Qt. <0.02 0.00 - 0.06 NG/ML  
DIGOXIN Collection Time: 07/18/18  8:50 AM  
Result Value Ref Range Digoxin level <0.2 (L) 0.9 - 2.0 NG/ML  
EKG, 12 LEAD, INITIAL Collection Time: 07/18/18  8:58 AM  
Result Value Ref Range Ventricular Rate 43 BPM  
 Atrial Rate 74 BPM  
 QRS Duration 90 ms Q-T Interval 472 ms QTC Calculation (Bezet) 398 ms Calculated R Axis -32 degrees Calculated T Axis 133 degrees Diagnosis Atrial fibrillation with slow ventricular response with premature ventricular 
 or aberrantly conducted complexes Left axis deviation Anteroseptal infarct (cited on or before 08-FEB-2013) ST & T wave abnormality, consider lateral ischemia or digitalis effect Abnormal ECG When compared with ECG of 05-JAN-2018 10:17, 
Vent. rate has decreased BY  22 BPM 
T wave inversion more evident in Lateral leads CC: Cj Lane MD

## 2018-07-18 NOTE — ED NOTES
Pt hourly rounding competed. Safety   Pt (x) resting on stretcher with side rails up and call bell in reach. () in chair    () in parents arms. Toileting   Pt offered (x)Bedpan     ()Assistance to Restroom     ()Urinal  Ongoing Updates  Updated on plan of care and status of test results. Pain Management  Inquired as to comfort and offered comfort measures:    (x) warm blankets   () dimmed lights  Family remain at the bedside, speech therapy at bedside as well.

## 2018-07-18 NOTE — ED NOTES
Assumed care of pt at this time. Pt resting comfortably in stretcher, IV fluids and meds infusing per MAR orders. VS stable, pt expresses no pain and no needs at this time. Family at the bedside, updated on POC. Will continue to monitor and assess.

## 2018-07-18 NOTE — PROGRESS NOTES
65 Pt arrived from ED and was safely transferred to ICU bed 10  1615 Bedside report given by Sarah Navarro RN. We reviewed SBAR, labs, meds, and plan of care for pt  1620 Admission assessment completed  1745 Accompanied pt for MRI  1840 Returned with pt to ICU bed 10, safely. VS remained stable and pt exhibited no S/S of distress  1920 Completed shift report and transferred care to DARRELL Polk RN. We reviewed SBAR, labs, meds, and plan of care.

## 2018-07-18 NOTE — IP AVS SNAPSHOT
[address] [address] 
[phone] Patient: Ita Maharaj MRN: [id number] About your hospitalization You were admitted on:  July 18, 2018 You last received care in the:  [address] [place] [address] You were discharged on:  July 28, 2018 Why you were hospitalized Your primary diagnosis was:  Acute Cva (Cerebrovascular Accident) (Hcc) Your diagnoses also included:  Atrial Fibrillation With Slow Ventricular Response (Hcc), Sss (Sick Sinus Syndrome) (Hcc), Cad (Coronary Artery Disease), Hypercholesterolemia Follow-up Information Follow up With Details Comments Contact Info Kimani 18 to continue managing your healthcare needs. [phone] Leon Lawrence MD  pt/inr on Monday. [address] 
[phone] Dr. Julito Redmond office is closed Friday afternoon and Case Management is unable to schedule an appointment for patient to have pt/inr drawn. CMS spoke to patient's son and he is aware of the importance of having this done. He stated that he will take patient to physician's office on Monday. Leon Lawrence MD Schedule an appointment as soon as possible for a visit in 2 days  [address] [facility] [address] [place] [address] 
[phone] Discharge Orders None A check julissa indicates which time of day the medication should be taken. My Medications CHANGE how you take these medications Instructions Each Dose to Equal  
 Morning Noon Evening Bedtime  
 atorvastatin 80 mg tablet Commonly known as:  LIPITOR Start taking on:  7/29/2018 What changed:   
- medication strength 
- how much to take Your next dose is:  7/29/18 am 
  
   
 Take 1 Tab by mouth daily. 80 mg  
    
  
   
   
   
  
 warfarin 2 mg tablet Commonly known as:  COUMADIN What changed:   
- medication strength 
- how much to take Your next dose is:  7/28/18 pm 
  
   
 Take 1 Tab by mouth daily. 2 mg CONTINUE taking these medications Instructions Each Dose to Equal  
 Morning Noon Evening Bedtime  
 latanoprost 0.005 % ophthalmic solution Commonly known as:  Tonye Rist Your next dose is:  7/28/18 pm 
  
   
 Administer 1 Drop to both eyes nightly. 1 Drop  
    
   
   
   
  
  
 losartan 100 mg tablet Commonly known as:  COZAAR Your next dose is:  7/29/18 am 
  
   
 Take 100 mg by mouth daily. 100 mg  
    
  
   
   
   
  
 omeprazole 20 mg capsule Commonly known as:  PRILOSEC Your next dose is:  7/29/18 am  
   
 Take 20 mg by mouth daily. 20 mg  
    
  
   
   
   
  
  
STOP taking these medications   
 amLODIPine 5 mg tablet Commonly known as:  NORVASC  
   
  
 aspirin 81 mg chewable tablet CARDIZEM  mg ER capsule Generic drug:  dilTIAZem CD  
   
  
 REGLAN PO Where to Get Your Medications Information on where to get these meds will be given to you by the nurse or doctor. ! Ask your nurse or doctor about these medications  
  atorvastatin 80 mg tablet  
 warfarin 2 mg tablet Discharge Instructions DISCHARGE SUMMARY from Nurse PATIENT INSTRUCTIONS: 
 
What to do at Home: 
 
Recommended activity: Activity as tolerated. Please follow up with Primary care physician. *  Please give a list of your current medications to your Primary Care Provider. *  Please update this list whenever your medications are discontinued, doses are 
    changed, or new medications (including over-the-counter products) are added. *  Please carry medication information at all times in case of emergency situations. These are general instructions for a healthy lifestyle: No smoking/ No tobacco products/ Avoid exposure to second hand smoke Surgeon General's Warning:  Quitting smoking now greatly reduces serious risk to your health. Obesity, smoking, and sedentary lifestyle greatly increases your risk for illness A healthy diet, regular physical exercise & weight monitoring are important for maintaining a healthy lifestyle You may be retaining fluid if you have a history of heart failure or if you experience any of the following symptoms:  Weight gain of 3 pounds or more overnight or 5 pounds in a week, increased swelling in our hands or feet or shortness of breath while lying flat in bed. Please call your doctor as soon as you notice any of these symptoms; do not wait until your next office visit. Recognize signs and symptoms of STROKE: 
 
F-face looks uneven A-arms unable to move or move unevenly S-speech slurred or non-existent T-time-call 911 as soon as signs and symptoms begin-DO NOT go Back to bed or wait to see if you get better-TIME IS BRAIN. Warning Signs of HEART ATTACK Call 911 if you have these symptoms: 
? Chest discomfort. Most heart attacks involve discomfort in the center of the chest that lasts more than a few minutes, or that goes away and comes back. It can feel like uncomfortable pressure, squeezing, fullness, or pain. ? Discomfort in other areas of the upper body. Symptoms can include pain or discomfort in one or both arms, the back, neck, jaw, or stomach. ? Shortness of breath with or without chest discomfort. ? Other signs may include breaking out in a cold sweat, nausea, or lightheadedness. Don't wait more than five minutes to call [address]! Fast action can save your life. Calling 911 is almost always the fastest way to get lifesaving treatment. Emergency Medical Services staff can begin treatment when they arrive  up to an hour sooner than if someone gets to the hospital by car. The discharge information has been reviewed with the patient. The patient verbalized understanding.  
Discharge medications reviewed with the patient and appropriate educational materials and side effects teaching were provided. Patient armband removed and shredded 
 
 
 
___________________________________________________________________________________________________________________________________ Introducing [facility]! Dear Dina Mock: Thank you for requesting a [employer] account. Our records indicate that you already have an active [employer] account. You can access your account anytime at [email or web address] Did you know that you can access your hospital and ER discharge instructions at any time in [employer]? You can also review all of your test results from your hospital stay or ER visit. Additional Information If you have questions, please visit the Frequently Asked Questions section of the [employer] website at [email or web address] Remember, [employer] is NOT to be used for urgent needs. For medical emergencies, dial 911. Now available from your iPhone and Android! Introducing Douglas Harris As a [facility] patient, I wanted to make you aware of our electronic visit tool called Douglas [facility]. [facility] 24/7 allows you to connect within minutes with a medical provider 24 hours a day, seven days a week via a mobile device or tablet or logging into a secure website from your computer. You can access Douglas Harris from anywhere in the [place]. A virtual visit might be right for you when you have a simple condition and feel like you just dont want to get out of bed, or cant get away from work for an appointment, when your regular [facility] provider is not available (evenings, weekends or holidays), or when youre out of town and need minor care. Electronic visits cost only $49 and if the [facility] 24/7 provider determines a prescription is needed to treat your condition, one can be electronically transmitted to a nearby pharmacy*. Please take a moment to enroll today if you have not already done so. The enrollment process is free and takes just a few minutes. To enroll, please download the [employer] 24/7 gurvinder to your tablet or phone, or visit [email or web address] to enroll on your computer. And, as an [address] [place] [address] patient with a [employer] account, the results of your visits will be scanned into your electronic medical record and your primary care provider will be able to view the scanned results. We urge you to continue to see your regular [facility] Rosenbaum provider for your ongoing medical care. And while your primary care provider may not be the one available when you seek a [facility] virtual visit, the peace of mind you get from getting a real diagnosis real time can be priceless. For more information on [facility], view our Frequently Asked Questions (FAQs) at [email or web address]. Sincerely, 
 
Terry Hardwick MD 
Chief Medical Officer [address] [place] [address] *:  certain medications cannot be prescribed via [employer] Providers Seen During Your Hospitalization Provider Specialty Primary office phone Al Baig MD Emergency Medicine [phone] Clari Sheets MD Internal Medicine [phone] Your Primary Care Physician (PCP) Primary Care Physician Office Phone Office Fax [address], [address] [facility] [phone] You are allergic to the following Allergen Reactions Amoxicillin Rash Recent Documentation Height Weight BMI OB Status Smoking Status 1.524 m 69 kg 29.72 kg/m2 Menopause Never Smoker Emergency Contacts Name Discharge Info Relation Home Work Mobile [address] CAREGIVER [3] Daughter [21] [phone] Juan Kingston DISCHARGE CAREGIVER [3] Son [22] [phone] Patient Belongings The following personal items are in your possession at time of discharge: Dental Appliances: Uppers  Visual Aid: None Discharge Instructions Attachments/References ISCHEMIC STROKE: GENERAL INFO (ENGLISH) WARFARIN SAFETY TIPS (ENGLISH) Patient Handouts Learning About an Ischemic Stroke What is an ischemic stroke? An ischemic (say \"tyb-KUV-fedn\") stroke occurs when a blood clot blocks a blood vessel in the brain. This means that blood cannot flow to some part of the brain. Without blood and the oxygen it carries, this part of the brain starts to die. The part of the body controlled by the damaged area of the brain cannot work properly. This is different from a hemorrhagic (say \"hfp-bdq-SK-jick\") stroke, which happens when a blood vessel in the brain has burst open or has started to leak. The brain damage from a stroke starts within minutes. Quick treatment can help limit damage to the brain and make recovery more likely. People who have had a stroke may have a hard time talking, understanding things, and making decisions. They may have to relearn daily activities, such as how to eat, bathe, and dress. How well someone recovers from a stroke depends on how quickly the person gets to the hospital, where in the brain the stroke happened, and how severe it was. Training and therapy also make a difference in how well people recover. What are the symptoms? If you have any of these symptoms, call 911 or other emergency services right away: 
· You have symptoms of a stroke. These may include: 
¨ Sudden numbness, tingling, weakness, or loss of movement in your face, arm, or leg, especially on only one side of your body. ¨ Sudden vision changes. ¨ Sudden trouble speaking. ¨ Sudden confusion or trouble understanding simple statements. ¨ Sudden problems with walking or balance. ¨ A sudden, severe headache that is different from past headaches.  
See your doctor if you have symptoms that seem like a stroke, even if they go away quickly. You may have had a transient ischemic attack (TIA), sometimes called a mini-stroke. A TIA is a warning that a stroke may happen soon. Getting early treatment for a TIA can help prevent a stroke. What causes an ischemic stroke? An ischemic stroke is caused by a blood clot that blocks blood flow in the brain. The most common causes of blood clots include: 
· Hardening of the arteries (atherosclerosis). This is caused by high blood pressure, diabetes, high cholesterol, or smoking. · Atrial fibrillation. How is ischemic stroke treated? You may have to take several medicines, depending on what caused your stroke. Ask your doctor if a stroke rehab program is right for you. Rehab increases your chances of getting back some of the abilities you lost. 
How can you prevent another stroke? · Work with your doctor to treat any health problems you have. High blood pressure, high cholesterol, atrial fibrillation, and diabetes all raise your chances of having a stroke. · Be safe with medicines. Take your medicine exactly as prescribed. Call your doctor if you think you are having a problem with your medicine. · Have a healthy lifestyle. ¨ Do not smoke or allow others to smoke around you. If you need help quitting, talk to your doctor about stop-smoking programs and medicines. These can increase your chances of quitting for good. Smoking makes a stroke more likely. ¨ Limit alcohol to 2 drinks a day for men and 1 drink a day for women. ¨ Lose weight if you need to. A healthy weight will help you keep your heart and body healthy. ¨ Be active. Ask your doctor what type and level of activity is safe for you. ¨ Eat heart-healthy foods, like fruits, vegetables, and high-fiber foods. Follow-up care is a key part of your treatment and safety. Be sure to make and go to all appointments, and call your doctor if you are having problems.  It's also a good idea to know your test results and keep a list of the medicines you take. Where can you learn more? Go to http://juan jose-cindy.info/. Enter D161 in the search box to learn more about \"Learning About an Ischemic Stroke. \" Current as of: November 21, 2017 Content Version: 11.7 © 7775-7068 [employer]. Care instructions adapted under license by [employer] (which disclaims liability or warranty for this information). If you have questions about a medical condition or this instruction, always ask your healthcare professional. [address] any warranty or liability for your use of this information. Taking Warfarin Safely: Care Instructions Your Care Instructions Warfarin is a medicine that you take to prevent blood clots. It is often called a blood thinner. Doctors give warfarin (such as Coumadin) to reduce the risk of blood clots. You may be at risk for blood clots if you have atrial fibrillation or deep vein thrombosis. Some other health problems may also put you at risk. Warfarin slows the amount of time it takes for your blood to clot. It can cause bleeding problems. Even if you've been taking warfarin for a while, it's important to know how to take it safely. Foods and other medicines can affect the way warfarin works. Some can make warfarin work too well. This can cause bleeding problems. And some can make it work poorly, so that it does not prevent blood clots very well. You will need regular blood tests to check how long it takes for your blood to form a clot. This test is called a PT or prothrombin time test. The result of the test is called an INR level. Depending on the test results, your doctor or anticoagulation clinic may adjust your dose of warfarin. Follow-up care is a key part of your treatment and safety. Be sure to make and go to all appointments, and call your doctor if you are having problems.  It's also a good idea to know your test results and keep a list of the medicines you take. How can you care for yourself at home? Take warfarin safely · Take your warfarin at the same time each day. · If you miss a dose of warfarin, don't take an extra dose to make up for it. Your doctor can tell you exactly what to do so you don't take too much or too little. · Wear medical alert jewelry that lets others know that you take warfarin. You can buy this at most drugstores. · Don't take warfarin if you are pregnant or planning to get pregnant. Talk to your doctor about how you can prevent getting pregnant while you are taking it. · Don't change your dose or stop taking warfarin unless your doctor tells you to. Effects of medicines and food on warfarin · Don't start or stop taking any medicines, vitamins, or natural remedies unless you first talk to your doctor. Many medicines can affect how warfarin works. These include aspirin and other pain relievers, over-the-counter medicines, multivitamins, dietary supplements, and herbal products. · Tell all of your doctors and pharmacists that you take warfarin. Some prescription medicines can affect how warfarin works. · Keep the amount of vitamin K in your diet about the same from day to day. Do not suddenly eat a lot more or a lot less food that is rich in vitamin K than you usually do. Vitamin K affects how warfarin works and how your blood clots. Talk with your doctor before making big changes in your diet. Foods that have a lot of vitamin K include cooked green vegetables, such as: ¨ Kale, spinach, turnip greens, sai greens, Swiss chard, and mustard greens. ¨ [place] sprouts, broccoli, and asparagus. · Limit your use of alcohol. Avoid bleeding by preventing falls and injuries · Wear slippers or shoes with nonskid soles. · Remove throw rugs and clutter. · Rearrange furniture and electrical cords to keep them out of walking paths. · Keep stairways, porches, and outside walkways well lit.  Use night-lights in hallways and bathrooms. · Be extra careful when you work with sharp tools or knives. When should you call for help? Call 911 anytime you think you may need emergency care. For example, call if: 
  · You have a sudden, severe headache that is different from past headaches.  
 Call your doctor now or seek immediate medical care if: 
  · You have any abnormal bleeding, such as: 
¨ Nosebleeds. ¨ Vaginal bleeding that is different (heavier, more frequent, at a different time of the month) than what you are used to. ¨ Bloody or black stools, or rectal bleeding. ¨ Bloody or pink urine.  
 Watch closely for changes in your health, and be sure to contact your doctor if you have any problems. Where can you learn more? Go to http://juan jose-cindy.info/. Enter B587 in the search box to learn more about \"Taking Warfarin Safely: Care Instructions. \" Current as of: December 6, 2017 Content Version: 11.7 © 1410-7137 [employer]. Care instructions adapted under license by [employer] (which disclaims liability or warranty for this information). If you have questions about a medical condition or this instruction, always ask your healthcare professional. [place] [address] any warranty or liability for your use of this information. Please provide this summary of care documentation to your next provider. Signatures-by signing, you are acknowledging that this After Visit Summary has been reviewed with you and you have received a copy. Patient Signature:  ____________________________________________________________ Date:  ____________________________________________________________  
  
Dusty Rush Provider Signature:  ____________________________________________________________ Date:  ____________________________________________________________

## 2018-07-18 NOTE — ED NOTES
Called ICU and spoke with Cinthia Lopez to let nurse know that SBAR is ready on pt. Echo at bedside. Will take pt to ICU when Echo is done.

## 2018-07-18 NOTE — CONSULTS
[facility] Consult Note      Patient: Bryson Lynch MRN: [id number]  SSN: [id number]    YOB: 1930  Age: 80 y.o. Sex: female    Date of Consultation: 07/18/2018  Referring Physician: Dr. Isabelle Chan  Reason for Consultation: Bradycardia and slow afib    HPI:  I was asked by Dr. Isabelle Chan to see this pleasant patient for bradycardia and symptoms of stroke. Patient have chronic history of Afib and on warfarin. Patient have history of nocturnal bradycardia in past as well. Pt came in hospital with HR of 33/min, slow afib. Patient came with symptom of left sided weakness and diagnosed with stroke. Pt denied CP, SOB, Plapitaion and dizziness. Past Medical History:   Diagnosis Date    Arthritis     Atrial fibrillation (HCC)     CAD (coronary artery disease)     Edema     Gastrointestinal disorder     High cholesterol     Hypertension     Hypoglycemia     Hypoglycemia     IBS (irritable bowel syndrome)     MI, old      Past Surgical History:   Procedure Laterality Date    HX CHOLECYSTECTOMY      HX KNEE REPLACEMENT      HX ORTHOPAEDIC      Left TKR     Current Facility-Administered Medications   Medication Dose Route Frequency    DOPamine (INTROPIN) 400 mg in dextrose 5% 250 mL infusion  0-20 mcg/kg/min IntraVENous TITRATE    sodium chloride (NS) flush 5-10 mL  5-10 mL IntraVENous Q8H    sodium chloride (NS) flush 5-10 mL  5-10 mL IntraVENous PRN    0.9% sodium chloride infusion  75 mL/hr IntraVENous CONTINUOUS    [START ON 7/19/2018] aspirin chewable tablet 81 mg  81 mg Oral DAILY    heparin (porcine) injection 5,000 Units  5,000 Units SubCUTAneous Q8H    atorvastatin (LIPITOR) tablet 40 mg  40 mg Oral DAILY    pantoprazole (PROTONIX) 40 mg in sodium chloride 0.9% 10 mL injection  40 mg IntraVENous DAILY       Allergies and Intolerances: Allergies   Allergen Reactions    Amoxicillin Rash       Family History:   History reviewed. No pertinent family history.     Social History:   She  reports that she has never smoked. She does not have any smokeless tobacco history on file. She  reports that she drinks about 0.5 oz of alcohol per week       Review of Systems:     Review of Systems  Gen: No fever, chills, malaise, weight loss/gain. Heent: No headache, rhinorrhea, epistaxis, ear pain, hearing loss, sinus pain, neck pain/stiffness, sore throat. Heart: No chest pain, palpitations, NEIL, pnd, or orthopnea. Resp: No cough, hemoptysis, wheezing and shortness of breath. GI: No nausea, vomiting, diarrhea, constipation, melena or hematochezia. : No urinary obstruction, dysuria or hematuria. Derm: No rash, new skin lesion or pruritis. Musc/skeletal: no bone or joint complains. Vasc: No edema, cyanosis or claudication. Endo: No heat/cold intolerance, no polyuria,polydipsia or polyphagia. Neuro: +unilateral weakness, numbness, tingling. No seizures. Heme: No easy bruising or bleeding. Physical:   Patient Vitals for the past 6 hrs:   Temp Pulse Resp BP SpO2   07/18/18 1730 - 65 19 (!) 119/105 97 %   07/18/18 1700 - 68 12 134/61 97 %   07/18/18 1630 - 66 11 131/75 97 %   07/18/18 1615 97.4 °F (36.3 °C) 62 12 133/52 97 %   07/18/18 1607 - 68 - 123/46 -   07/18/18 1536 - - - 131/67 -   07/18/18 1533 97.8 °F (36.6 °C) 66 17 132/57 97 %   07/18/18 1445 - 63 12 143/65 97 %   07/18/18 1430 - 66 17 137/73 96 %   07/18/18 1415 - 64 14 122/73 95 %   07/18/18 1400 - 68 14 132/68 97 %   07/18/18 1345 - 67 14 142/64 97 %   07/18/18 1330 - 64 12 142/59 96 %   07/18/18 1315 - 65 11 140/62 96 %   07/18/18 1300 - 62 10 125/68 96 %   07/18/18 1245 - 62 13 131/67 96 %   07/18/18 1230 - 61 15 120/58 97 %   07/18/18 1215 - 69 16 125/53 96 %         Exam:   General Appearance: Comfortable, not using accessory muscles of respiration. HEENT: ANTON. HEAD: Atraumatic  NECK: No JVD, no thyroidomeglay. CAROTIDS:  LUNGS: Clear bilaterally.    HEART: S1 variable +S2     ABD: Non-tender, BS Audible    EXT: No edema, and no cysnosis. VASCULAR EXAM: Pulses are intact. PSYCHIATRIC EXAM: Mood is appropriate. MUSCULOSKELETAL: Grossly no joint deformity. NEUROLOGICAL: oriented in time place person   Review of Data:   LABS:   Lab Results   Component Value Date/Time    WBC 6.9 07/18/2018 08:50 AM    HGB 13.9 07/18/2018 08:50 AM    HCT 42.1 07/18/2018 08:50 AM    PLATELET 556 57/03/7814 08:50 AM     Lab Results   Component Value Date/Time    Sodium 136 07/18/2018 08:50 AM    Potassium 4.0 07/18/2018 08:50 AM    Chloride 104 07/18/2018 08:50 AM    CO2 26 07/18/2018 08:50 AM    Glucose 129 (H) 07/18/2018 08:50 AM    BUN 13 07/18/2018 08:50 AM    Creatinine 1.09 07/18/2018 08:50 AM     Lab Results   Component Value Date/Time    Cholesterol, total 147 04/12/2013 09:30 PM    HDL Cholesterol 56 04/12/2013 09:30 PM    LDL, calculated 75.6 04/12/2013 09:30 PM    Triglyceride 77 04/12/2013 09:30 PM     No results found for: GPT  Lab Results   Component Value Date/Time    Hemoglobin A1c 5.2 07/18/2018 08:50 AM         Cardiology Procedures:   Results for orders placed or performed during the hospital encounter of 07/18/18   EKG, 12 LEAD, INITIAL   Result Value Ref Range    Ventricular Rate 43 BPM    Atrial Rate 74 BPM    QRS Duration 90 ms    Q-T Interval 472 ms    QTC Calculation (Bezet) 398 ms    Calculated R Axis -32 degrees    Calculated T Axis 133 degrees    Diagnosis       Atrial fibrillation with slow ventricular response with premature ventricular   or aberrantly conducted complexes  Left axis deviation  Anteroseptal infarct (cited on or before 08-FEB-2013)  ST & T wave abnormality, consider lateral ischemia or digitalis effect  Abnormal ECG  When compared with ECG of 05-JAN-2018 10:17,  Vent.  rate has decreased BY  22 BPM  T wave inversion more evident in Lateral leads             Impression / Plan:    Patient Active Problem List   Diagnosis Code    Abdominal pain, other specified site R10.9    Atrial fibrillation ([facility] [address] I48.91    Constipation K59.00    Encephalopathy, unspecified G93.40    Abdominal wall hematoma S30. 1XXA    CAD (coronary artery disease) I25.10    Hypercholesterolemia E78.00    Hypertension I10    Hx of myocardial infarction I25.2    Elevated INR R79.1    Atrial fibrillation with slow ventricular response (HCC) I48.91    SSS (sick sinus syndrome) ([facility]) I49.5    Acute CVA (cerebrovascular accident) ([facility] [address] I63.9         A/P  1- Bradycardia-  3- Afib with bradycardia  3- SSS  4- Stroke    Plan:  DC Cardizem  Start dopamine  Continue with supportive treatment. Patient may need PPM in future.   Discussed with patient and family and Dr. Jaren Workman.        Signed By: Inocente Rodriguez MD     July 18, 2018

## 2018-07-18 NOTE — PROGRESS NOTES
Problem: Dysphagia (Adult)  Goal: *Acute Goals and Plan of Care (Insert Text)  Recommendations:  Diet: Puree/thin   Meds: Crushed in puree  Aspiration Precautions  Oral Care TID  Other: Assistance with PO    Goals:  Patient will:  1. Tolerate PO trials with 0 s/s overt distress in 4/5 trials  2. Utilize compensatory swallow strategies/maneuvers (decrease bite/sip, size/rate, alt. liq/sol) with min cues in 4/5 trials  3. Perform oral-motor/laryngeal exercises to increase oropharyngeal swallow function with min cues  4. Complete an objective swallow study (i.e., MBSS) to assess swallow integrity, r/o aspiration, and determine of safest LRD, min A      Outcome: Progressing Towards Goal    Speech LAnguage Pathology bedside swallow   evaluation & TREATMENT     Patient: Paty Ren (51 y.o. female)  Date: 7/18/2018  Primary Diagnosis: Atrial fibrillation with slow ventricular response ([address]        Precautions: Aspiration     PLOF: Living with family  ASSESSMENT :  Based on the objective data described below, the patient presents with moderate oral, mild pharyngeal dysphagia in the setting of L sided weakness and slurred speech. Pt A&Ox4 with daughter and granddaughter at bedside. Oral-motor exam revealed left droop, all other structures with overall weakness. Dysarthric speech noted. Basic cognitive-linguistic functioning appears intact. Pt's daughter reports hx of multiple esophageal dilatations, soft solid/chopped meat diet at baseline. Pt accepted SLP-fed thin liquid via straw and puree; audible swallow, otherwise functional swallow. Solid cracker trial; pt with significantly delayed mastication and pocketing in L buccal cavity, required x3 thin liquid wash to clear. Recommend puree/thin liquid diet with L lingual sweep, aspiration precautions and supervision with all PO.      Skilled therapy initiated; Educated pt and family on aspiration precautions and importance of compensatory swallow techniques to decrease aspiration risk (decrease rate of intake & sip/bite size, upright @HOB for all po intake and ~30 minutes after po); verbalized comprehension. SLP will follow as indicated, full dysarthria evaluation to follow. Patient will benefit from skilled intervention to address the above impairments. Patients rehabilitation potential is considered to be Fair  Factors which may influence rehabilitation potential include:   []            None noted  []            Mental ability/status  []            Medical condition  []            Home/family situation and support systems  [x]            Safety awareness  []            Pain tolerance/management  []            Other:      PLAN :  Recommendations and Planned Interventions:  Puree/thin  Frequency/Duration: Patient will be followed by speech-language pathology 1-2 times per day/4-7 days per week to address goals. Discharge Recommendations: To Be Determined     SUBJECTIVE:   Patient stated Thank you.     OBJECTIVE:     Past Medical History:   Diagnosis Date    Arthritis     Atrial fibrillation ([facility] [address]     CAD (coronary artery disease)     Edema     Gastrointestinal disorder     High cholesterol     Hypertension     Hypoglycemia     Hypoglycemia     IBS (irritable bowel syndrome)     MI, old      Past Surgical History:   Procedure Laterality Date    HX CHOLECYSTECTOMY      HX KNEE REPLACEMENT      HX ORTHOPAEDIC      Left TKR     Prior Level of Function/Home Situation: Living with family     Diet prior to admission: Soft solids/, hopped meat, thin liquid  Current Diet:  Puree/thin   Cognitive and Communication Status:  Neurologic State: Alert  Orientation Level: Oriented X4  Cognition: Follows commands, Appropriate safety awareness  Perception: Cues to attend left visual field  Perseveration: No perseveration noted  Safety/Judgement: Awareness of environment  Oral Assessment:  Oral Assessment  Labial: Left droop  Dentition: Upper & lower dentures  Oral Hygiene: Good  Lingual: Decreased rate  Velum: Unable to visualize  Mandible: No impairment  P.O. Trials:  Patient Position: HOB 60  Vocal quality prior to P.O.: No impairment  Consistency Presented: Thin liquid; Solid;Puree  How Presented: SLP-fed/presented;Straw;Successive swallows     Bolus Acceptance: No impairment  Bolus Formation/Control: Impaired  Type of Impairment: Incomplete;Mastication  Propulsion: No impairment  Oral Residue: Lingual;10-50% of bolus  Initiation of Swallow: No impairment  Laryngeal Elevation: Functional  Aspiration Signs/Symptoms: None  Pharyngeal Phase Characteristics: Audible swallow  Effective Modifications: Alternate liquids/solids;Small sips and bites  Cues for Modifications: Minimal       Oral Phase Severity: Moderate  Pharyngeal Phase Severity : Mild    GCODESwallowing:  Swallow Current Status CL= 60-79%   Swallow Goal Status CI= 1-19%    The severity rating is based on the following outcomes:  LIDIA Noms Swallow Level 3    Clinical Judgement    PAIN:  Start of Eval/Tx: 0  End of Eval/Tx: 0     After treatment:   []            Patient left in no apparent distress sitting up in chair  [x]            Patient left in no apparent distress in bed  [x]            Call bell left within reach  []            Nursing notified  [x]            Family present  []            Caregiver present  []            Bed alarm activated    COMMUNICATION/EDUCATION:   [x]            Safe swallowing guidelines; compensatory techniques. [x]            Patient/family have participated as able in goal setting and plan of care. [x]            Patient/family agree to work toward stated goals and plan of care. []            Patient understands intent and goals of therapy; neutral about participation. []            Patient unable to participate in goal setting/plan of care; educ ongoing with interdisciplinary staff  []         Posted safety precautions in patient's room.     Thank you for this referral.  Tanja Menon CEDRICK Lucia  Time Calculation: 20 mins  Evaluation Time: 10 minutes   Treatment Time: 10 minutes

## 2018-07-18 NOTE — ED PROVIDER NOTES
EMERGENCY DEPARTMENT HISTORY AND PHYSICAL EXAM    Date: 7/18/2018  Patient Name: Raul Enrique    History of Presenting Illness     Chief Complaint   Patient presents with    Extremity Weakness         History Provided By: Patient's Daughter and EMS    Chief Complaint: Weakness  Duration: 30 Minutes  Timing:  Acute  Location: Left side  Severity: Moderate  Associated Symptoms: slurred speech    Additional History (Context):   8:38 AM  Raul Enrique is a 80 y.o. female with PMHX CAD, MI, A-fib, HTN, HLD, hypoglycemia presents to the emergency department via EMS C/O acute left sided weakness, onset 30 minutes ago. Last known normal last night. Per daughter, son noticed the pt's sxs 30 minutes ago, but does not know if the sxs started during the night or if the pt woke up with the sxs. Associated sxs include slurred speech, left facial droop, and dizziness. Per EMS, FSBS 118. Currently on Coumadin. Pt denies any pain, and any other sxs or complaints. PCP: Beth Mora MD    Current Facility-Administered Medications   Medication Dose Route Frequency Provider Last Rate Last Dose    DOPamine (INTROPIN) 400 mg in dextrose 5% 250 mL infusion  0-20 mcg/kg/min IntraVENous TITRATE Sumit Chau MD 12.8 mL/hr at 07/18/18 1037 5 mcg/kg/min at 07/18/18 1037     Current Outpatient Prescriptions   Medication Sig Dispense Refill    dilTIAZem CD (CARDIZEM CD) 180 mg ER capsule Take 180 mg by mouth daily.  warfarin (COUMADIN) 2.5 mg tablet Take 2.5 mg by mouth daily.  amLODIPine (NORVASC) 5 mg tablet Take 5 mg by mouth daily.  losartan (COZAAR) 100 mg tablet Take 100 mg by mouth daily.  omeprazole (PRILOSEC) 20 mg capsule Take 20 mg by mouth daily.  METOCLOPRAMIDE HCL (REGLAN PO) Take 10 mg by mouth daily.  ATORVASTATIN CALCIUM (LIPITOR PO) Take 10 mg by mouth daily.          Past History     Past Medical History:  Past Medical History:   Diagnosis Date    Arthritis     Atrial fibrillation ([address]     CAD (coronary artery disease)     Edema     Gastrointestinal disorder     High cholesterol     Hypertension     Hypoglycemia     Hypoglycemia     IBS (irritable bowel syndrome)     MI, old        Past Surgical History:  Past Surgical History:   Procedure Laterality Date    HX CHOLECYSTECTOMY      HX KNEE REPLACEMENT      HX ORTHOPAEDIC      Left TKR       Family History:  History reviewed. No pertinent family history. Social History:  Social History   Substance Use Topics    Smoking status: Never Smoker    Smokeless tobacco: None    Alcohol use 0.5 oz/week     1 Glasses of wine per week      Comment: every few weeks       Allergies: Allergies   Allergen Reactions    Amoxicillin Rash         Review of Systems   Review of Systems   Constitutional: Negative for chills and fever. Neurological: Positive for dizziness, facial asymmetry (left), speech difficulty and weakness (left sided). All other systems reviewed and are negative. Physical Exam     Vitals:    07/18/18 1000 07/18/18 1015 07/18/18 1030 07/18/18 1045   BP: 113/58 108/60 122/67 115/42   Pulse: (!) 47  (!) 46 (!) 42   Resp: 13  14 11   Temp:       SpO2: 95%  98% 97%   Weight:       Height:         Physical Exam   Nursing note and vitals reviewed. Constitutional: Well appearing. Elderly, mild distress. Head: Normocephalic, Atraumatic  Eyes: Pupils are equal, round, and reactive to light, EOMI  Neck: Supple, non-tender  Cardiovascular: Bradycardic. Regular rhythm, no murmurs, rubs, or gallops  Chest: Normal work of breathing and chest excursion bilaterally  Lungs: Clear to ausculation bilaterally  Abdomen: Soft, non tender, non distended  Back: No evidence of trauma or deformity  Extremities: No evidence of trauma or deformity. Mild BLE edema. Skin: Warm and dry, normal cap refill  Neuro: Alert and appropriate, slurred speech. Left facial droop that spares the forehead.  Weakness on the left, LLE more than LUE. Psychiatric: Normal mood and affect       Diagnostic Study Results     Labs -     Recent Results (from the past 12 hour(s))   CBC WITH AUTOMATED DIFF    Collection Time: 07/18/18  8:50 AM   Result Value Ref Range    WBC 6.9 4.6 - 13.2 K/uL    RBC 4.65 4.20 - 5.30 M/uL    HGB 13.9 12.0 - 16.0 g/dL    HCT 42.1 35.0 - 45.0 %    MCV 90.5 74.0 - 97.0 FL    MCH 29.9 24.0 - 34.0 PG    MCHC 33.0 31.0 - 37.0 g/dL    RDW 12.8 11.6 - 14.5 %    PLATELET 749 839 - 500 K/uL    MPV 10.3 9.2 - 11.8 FL    NEUTROPHILS 72 40 - 73 %    LYMPHOCYTES 17 (L) 21 - 52 %    MONOCYTES 9 3 - 10 %    EOSINOPHILS 2 0 - 5 %    BASOPHILS 0 0 - 2 %    ABS. NEUTROPHILS 5.0 1.8 - 8.0 K/UL    ABS. LYMPHOCYTES 1.2 0.9 - 3.6 K/UL    ABS. MONOCYTES 0.6 0.05 - 1.2 K/UL    ABS. EOSINOPHILS 0.1 0.0 - 0.4 K/UL    ABS. BASOPHILS 0.0 0.0 - 0.1 K/UL    DF AUTOMATED     METABOLIC PANEL, COMPREHENSIVE    Collection Time: 07/18/18  8:50 AM   Result Value Ref Range    Sodium 136 136 - 145 mmol/L    Potassium 4.0 3.5 - 5.5 mmol/L    Chloride 104 100 - 108 mmol/L    CO2 26 21 - 32 mmol/L    Anion gap 6 3.0 - 18 mmol/L    Glucose 129 (H) 74 - 99 mg/dL    BUN 13 7.0 - 18 MG/DL    Creatinine 1.09 0.6 - 1.3 MG/DL    BUN/Creatinine ratio 12 12 - 20      GFR est AA 57 (L) >60 ml/min/1.73m2    GFR est non-AA 47 (L) >60 ml/min/1.73m2    Calcium 9.6 8.5 - 10.1 MG/DL    Bilirubin, total 0.5 0.2 - 1.0 MG/DL    ALT (SGPT) 18 13 - 56 U/L    AST (SGOT) 16 15 - 37 U/L    Alk.  phosphatase 80 45 - 117 U/L    Protein, total 6.6 6.4 - 8.2 g/dL    Albumin 3.3 (L) 3.4 - 5.0 g/dL    Globulin 3.3 2.0 - 4.0 g/dL    A-G Ratio 1.0 0.8 - 1.7     PTT    Collection Time: 07/18/18  8:50 AM   Result Value Ref Range    aPTT 29.5 23.0 - 36.4 SEC   PROTHROMBIN TIME + INR    Collection Time: 07/18/18  8:50 AM   Result Value Ref Range    Prothrombin time 13.8 11.5 - 15.2 sec    INR 1.1 0.8 - 1.2     CARDIAC PANEL,(CK, CKMB & TROPONIN)    Collection Time: 07/18/18  8:50 AM   Result Value Ref Range    CK 29 26 - 192 U/L    CK - MB <1.0 <3.6 ng/ml    CK-MB Index  0.0 - 4.0 %     CALCULATION NOT PERFORMED WHEN RESULT IS BELOW LINEAR LIMIT    Troponin-I, Qt. <0.02 0.00 - 0.06 NG/ML   EKG, 12 LEAD, INITIAL    Collection Time: 07/18/18  8:58 AM   Result Value Ref Range    Ventricular Rate 43 BPM    Atrial Rate 74 BPM    QRS Duration 90 ms    Q-T Interval 472 ms    QTC Calculation (Bezet) 398 ms    Calculated R Axis -32 degrees    Calculated T Axis 133 degrees    Diagnosis       Atrial fibrillation with slow ventricular response with premature ventricular   or aberrantly conducted complexes  Left axis deviation  Anteroseptal infarct (cited on or before 08-FEB-2013)  ST & T wave abnormality, consider lateral ischemia or digitalis effect  Abnormal ECG  When compared with ECG of 05-JAN-2018 10:17,  Vent. rate has decreased BY  22 BPM  T wave inversion more evident in Lateral leads         Radiologic Studies -   XR CHEST PORT   Final Result      CT HEAD WO CONT   Final Result        CT Results  (Last 48 hours)               07/18/18 0845  CT HEAD WO CONT Final result    Impression:  IMPRESSION:       1. Progressive white matter findings, nonspecific but typically reflecting   progressive chronic ischemic change in a patient of this age. Stable right basal   ganglia lacunar infarct. Mild parenchymal volume loss/atrophy. 2. No CT evidence of acute intracranial hematoma, cortical infarct, or mass   effect/mass lesion. Please note that noncontrast head CT can be negative in the setting of   acute/subacute ischemia/infarct, and in the high index of clinical suspicion,   MRI could best further assess. Critical result call to Dr. Louise Garcia in ER, at 8:50 AM on 07/18/18, as per stroke   alert/CODE S protocol. Narrative:  EXAM: CT head        INDICATION: Left-sided weakness, slurred speech since 8:00 AM. CODE S.       COMPARISON: 04/12/13.        TECHNIQUE: Axial CT imaging of the head  was obtained from skull base to vertex   without intravenous contrast.       One or more dose reduction techniques were used on this CT: automated exposure   control, adjustment of the mAs and/or kVp according to patient's size, and   iterative reconstruction techniques. The specific techniques utilized on this CT   exam have been documented in the patient's electronic medical record.       _______________       FINDINGS:       BRAIN AND POSTERIOR FOSSA: The sulci, folia, ventricles and basal cisterns are   mildly prominent. Patchy periventricular, subcortical and deep white matter   hypodensities. Stable well-circumscribed hypodensity in the right basal   ganglia. . No acute intracranial hemorrhage, cortical infarct or mass effect/mass   lesion. EXTRA-AXIAL SPACES AND MENINGES: There are no abnormal extra-axial fluid   collections. CALVARIUM: No acute osseous abnormality. [place] SINUSES: The visualized portions of the paranasal sinuses and mastoid air cells   are well aerated. OTHER: None.       _______________               CXR Results  (Last 48 hours)               07/18/18 0957  XR CHEST PORT Final result    Impression:  Impression:       Possible small left pleural effusion. Otherwise unremarkable       Narrative:  CLINICAL: Left-sided weakness. Cardiac arrhythmia. COMPARISON: January 5, 2018. A portable view of the chest from 0954 hours:       Lung fields appear clear. Minimal blunting left costophrenic angle. No focal   airspace disease. Thoracolumbar scoliosis. Mediastinal silhouette stable. Atherosclerotic calcifications in the aortic arch . Medical Decision Making   I am the first provider for this patient. I reviewed the vital signs, available nursing notes, past medical history, past surgical history, family history and social history. Vital Signs-Reviewed the patient's vital signs.     Pulse Oximetry Analysis - 96% on RA     Cardiac Monitor:  Rate: 40 bpm  Rhythm: A-fib    EKG interpretation: (Preliminary)  8:58 AM   43 bpm, A-fib with slow ventricular response with premature ventricular or aberrantly conducted complexes, QRS duration 90 ms. EKG read by Reynold Hernandez MD at 9:00 AM     Records Reviewed: Nursing Notes    Procedures:  Procedures    Procedure Note- Peripheral IV Access  10:51 AM  Performed by: Reynold Hernandez MD  Gained IV access using  18 gauge needle because the patient had no vascular access. After cleaning the site with alcohol prep, the Right EJ vein was localized with ultrasound guidance in an anterior approach. Line confirmation was obtained by direct visualization and good blood return. No anaesthetic was used. The line was successfully flushed with normal saline and was secured with transparent tape. The procedure took 1-15 minutes, and pt tolerated well. Written by Gustavo Maya, ED Scribe, as dictated by Reynold Hernandez MD.    ED Course:     8:30 PM Code S called by EMS. 8:38 PM Initial assessment performed. The patients presenting problems have been discussed, and they are in agreement with the care plan formulated and outlined with them. I have encouraged them to ask questions as they arise throughout their visit. 8:46 AM Discussed patient's history, exam, and available diagnostics results with Kristie Josue MD, teleneurology, who agrees to evaluated the pt.     8:52 AM Radiologist called and states CT Head is negative. 8:59 AM Son is at bedside. Updated Dr. Liza Weinstein that son confirms pt was normal at [address] and started slurring her speech when she started her daily devotions. 9:11 AM Discussed patient's history, exam, and available diagnostics results with Dr. Rivera Luna, teleneurology, who does not recommend tPA at this time and would recommend addressing her HR.     9:11 AM Updated pt and family on plan. Will give Atropine for HR. Pt's cardiologist is Dr. Vin Singh.      9:12 AM Discussed patient's history, exam, and available diagnostics results with Ervin Rivero MD, cardiology, who is on his way to evaluate the pt.     9:14 AM Atropine given. HR increased to the mid-50s.    9:27 AM Per RN, HR has decreased back into the 30s. Will give another dose of Atropine. 9:42 AM Dr. Terrance Alva recommends putting pt on a Dopamine drip. 9:47 AM Discussed patient's history, exam, and available diagnostics results with Karolyn Grayson MD, internal medicine, who agrees to admit to ICU.     9:50 AM Discussed patient's history, exam, and available diagnostics results with Eileen Marie MD, neurology, who agrees to consult on pt.     10:17 AM Discussed patient's history, exam, and available diagnostics results with Dr. Shawn Jo, intensivist, who will consult on pt. States he can put in the central line if we can get the pt to the ICU.     10:28 AM Bed in ICU is not ready. Will place central line. 10:40 AM Obtained R EJ, therefore no central line needed at this time. Diagnosis and Disposition       Critical Care Time:   9:48 AM  I have spent 73 minutes of critical care time involved in lab review, consultations with specialist, family decision-making, and documentation. During this entire length of time I was immediately available to the patient. Critical Care: The reason for providing this level of medical care for this critically ill patient was due a critical illness that impaired one or more vital organ systems such that there was a high probability of imminent or life threatening deterioration in the patients condition. This care involved high complexity decision making to assess, manipulate, and support vital system functions, to treat this degreee vital organ system failure and to prevent further life threatening deterioration of the patients condition. Core Measures:  For Hospitalized Patients:    1.  Hospitalization Decision Time:  The decision to hospitalize the patient was made by Patricia Jimenez MD at 9:40 AM on 7/18/2018    2. Aspirin: Aspirin was given    9:48 AM  Patient is being admitted to the hospital by Pallavi Cedillo MD. The results of their tests and reasons for their admission have been discussed with them and/or available family. They convey agreement and understanding for the need to be admitted and for their admission diagnosis. CONDITIONS ON ADMISSION:  Sepsis is not present at the time of admission. Deep Vein Thrombosis is not present at the time of admission. MRSA is not present at the time of admission. Wound infection is not present at the time of admission. Pressure Ulcer is not present at the time of admission. CLINICAL IMPRESSION:    1. Atrial fibrillation with slow ventricular response ([address]    2. Slurred speech    3. Facial droop        Discussion: 80 y.o. female activated as Code S by EMS. Evaluated by teleneurologist. Found to be in very slow A-fib. Unclear if sxs are neurological or cardiac in origin, therefore, teleneurology recommend no tPA. Discussed with cardiology who recommend starting dopamine for cardiovascular support. Will require further ICU evaluation and management. PLAN:  1. ADMIT TO ICU  __________________________   Attestations:     SCRIBE ATTESTATION:  This note is prepared by Mina Rahman, acting as Scribe for Ifeanyi Coffey MD.    PROVIDER ATTESTATION:  Ifeanyi Coffey MD: The scribe's documentation has been prepared under my direction and personally reviewed by me in its entirety.  I confirm that the note above accurately reflects all work, treatment, procedures, and medical decision making performed by me.   _______________________________

## 2018-07-19 LAB
ANION GAP SERPL CALC-SCNC: 8 MMOL/L (ref 3–18)
BUN SERPL-MCNC: 10 MG/DL (ref 7–18)
BUN/CREAT SERPL: 11 (ref 12–20)
CALCIUM SERPL-MCNC: 9.9 MG/DL (ref 8.5–10.1)
CHLORIDE SERPL-SCNC: 103 MMOL/L (ref 100–108)
CHOLEST SERPL-MCNC: 200 MG/DL
CO2 SERPL-SCNC: 26 MMOL/L (ref 21–32)
CREAT SERPL-MCNC: 0.93 MG/DL (ref 0.6–1.3)
ERYTHROCYTE [DISTWIDTH] IN BLOOD BY AUTOMATED COUNT: 12.9 % (ref 11.6–14.5)
EST. AVERAGE GLUCOSE BLD GHB EST-MCNC: 97 MG/DL
GLUCOSE BLD STRIP.AUTO-MCNC: 106 MG/DL (ref 70–110)
GLUCOSE BLD STRIP.AUTO-MCNC: 119 MG/DL (ref 70–110)
GLUCOSE BLD STRIP.AUTO-MCNC: 123 MG/DL (ref 70–110)
GLUCOSE SERPL-MCNC: 117 MG/DL (ref 74–99)
HBA1C MFR BLD: 5 % (ref 4.5–5.6)
HCT VFR BLD AUTO: 44 % (ref 35–45)
HDLC SERPL-MCNC: 62 MG/DL (ref 40–60)
HDLC SERPL: 3.2 {RATIO} (ref 0–5)
HGB BLD-MCNC: 14.4 G/DL (ref 12–16)
LDLC SERPL CALC-MCNC: 119.4 MG/DL (ref 0–100)
LIPID PROFILE,FLP: ABNORMAL
MCH RBC QN AUTO: 29.8 PG (ref 24–34)
MCHC RBC AUTO-ENTMCNC: 32.7 G/DL (ref 31–37)
MCV RBC AUTO: 90.9 FL (ref 74–97)
PLATELET # BLD AUTO: 251 K/UL (ref 135–420)
PMV BLD AUTO: 10.4 FL (ref 9.2–11.8)
POTASSIUM SERPL-SCNC: 3.7 MMOL/L (ref 3.5–5.5)
RBC # BLD AUTO: 4.84 M/UL (ref 4.2–5.3)
SODIUM SERPL-SCNC: 137 MMOL/L (ref 136–145)
TRIGL SERPL-MCNC: 93 MG/DL (ref ?–150)
TSH SERPL DL<=0.05 MIU/L-ACNC: 1.5 UIU/ML (ref 0.36–3.74)
VLDLC SERPL CALC-MCNC: 18.6 MG/DL
WBC # BLD AUTO: 9.7 K/UL (ref 4.6–13.2)

## 2018-07-19 PROCEDURE — 65610000006 HC RM INTENSIVE CARE

## 2018-07-19 PROCEDURE — 97116 GAIT TRAINING THERAPY: CPT

## 2018-07-19 PROCEDURE — 74011250637 HC RX REV CODE- 250/637: Performed by: PSYCHIATRY & NEUROLOGY

## 2018-07-19 PROCEDURE — 80061 LIPID PANEL: CPT | Performed by: INTERNAL MEDICINE

## 2018-07-19 PROCEDURE — 82962 GLUCOSE BLOOD TEST: CPT

## 2018-07-19 PROCEDURE — 74011250636 HC RX REV CODE- 250/636: Performed by: INTERNAL MEDICINE

## 2018-07-19 PROCEDURE — 85027 COMPLETE CBC AUTOMATED: CPT | Performed by: INTERNAL MEDICINE

## 2018-07-19 PROCEDURE — C9113 INJ PANTOPRAZOLE SODIUM, VIA: HCPCS | Performed by: INTERNAL MEDICINE

## 2018-07-19 PROCEDURE — 97535 SELF CARE MNGMENT TRAINING: CPT

## 2018-07-19 PROCEDURE — 80048 BASIC METABOLIC PNL TOTAL CA: CPT | Performed by: INTERNAL MEDICINE

## 2018-07-19 PROCEDURE — 84443 ASSAY THYROID STIM HORMONE: CPT | Performed by: INTERNAL MEDICINE

## 2018-07-19 PROCEDURE — 97162 PT EVAL MOD COMPLEX 30 MIN: CPT

## 2018-07-19 PROCEDURE — 97167 OT EVAL HIGH COMPLEX 60 MIN: CPT

## 2018-07-19 PROCEDURE — 83036 HEMOGLOBIN GLYCOSYLATED A1C: CPT | Performed by: INTERNAL MEDICINE

## 2018-07-19 PROCEDURE — 36415 COLL VENOUS BLD VENIPUNCTURE: CPT | Performed by: INTERNAL MEDICINE

## 2018-07-19 PROCEDURE — 92526 ORAL FUNCTION THERAPY: CPT

## 2018-07-19 RX ORDER — SODIUM CHLORIDE 9 MG/ML
100 INJECTION, SOLUTION INTRAVENOUS CONTINUOUS
Status: DISCONTINUED | OUTPATIENT
Start: 2018-07-19 | End: 2018-07-20

## 2018-07-19 RX ORDER — WARFARIN SODIUM 5 MG/1
5 TABLET ORAL ONCE
Status: COMPLETED | OUTPATIENT
Start: 2018-07-19 | End: 2018-07-19

## 2018-07-19 RX ORDER — DOPAMINE HYDROCHLORIDE 160 MG/100ML
0-20 INJECTION, SOLUTION INTRAVENOUS
Status: DISCONTINUED | OUTPATIENT
Start: 2018-07-19 | End: 2018-07-21

## 2018-07-19 RX ORDER — FAMOTIDINE 20 MG/1
20 TABLET, FILM COATED ORAL DAILY
Status: DISCONTINUED | OUTPATIENT
Start: 2018-07-20 | End: 2018-07-28 | Stop reason: HOSPADM

## 2018-07-19 RX ORDER — FAMOTIDINE 20 MG/1
20 TABLET, FILM COATED ORAL 2 TIMES DAILY
Status: DISCONTINUED | OUTPATIENT
Start: 2018-07-20 | End: 2018-07-19

## 2018-07-19 RX ORDER — GUAIFENESIN 100 MG/5ML
324 LIQUID (ML) ORAL DAILY
Status: DISCONTINUED | OUTPATIENT
Start: 2018-07-19 | End: 2018-07-23

## 2018-07-19 RX ADMIN — PANTOPRAZOLE SODIUM 40 MG: 40 INJECTION, POWDER, FOR SOLUTION INTRAVENOUS at 08:44

## 2018-07-19 RX ADMIN — Medication 10 ML: at 21:22

## 2018-07-19 RX ADMIN — SODIUM CHLORIDE 100 ML/HR: 900 INJECTION, SOLUTION INTRAVENOUS at 21:22

## 2018-07-19 RX ADMIN — HEPARIN SODIUM 5000 UNITS: 5000 INJECTION, SOLUTION INTRAVENOUS; SUBCUTANEOUS at 07:26

## 2018-07-19 RX ADMIN — SODIUM CHLORIDE 100 ML/HR: 900 INJECTION, SOLUTION INTRAVENOUS at 12:04

## 2018-07-19 RX ADMIN — DOPAMINE HYDROCHLORIDE 3 MCG/KG/MIN: 160 INJECTION, SOLUTION INTRAVENOUS at 11:04

## 2018-07-19 RX ADMIN — SODIUM CHLORIDE 100 ML/HR: 900 INJECTION, SOLUTION INTRAVENOUS at 14:37

## 2018-07-19 RX ADMIN — Medication 10 ML: at 01:08

## 2018-07-19 RX ADMIN — HEPARIN SODIUM 5000 UNITS: 5000 INJECTION, SOLUTION INTRAVENOUS; SUBCUTANEOUS at 14:37

## 2018-07-19 RX ADMIN — Medication 10 ML: at 07:28

## 2018-07-19 RX ADMIN — ASPIRIN 81 MG 324 MG: 81 TABLET ORAL at 08:44

## 2018-07-19 RX ADMIN — DOPAMINE HYDROCHLORIDE IN DEXTROSE 4 MCG/KG/MIN: 1.6 INJECTION, SOLUTION INTRAVENOUS at 07:26

## 2018-07-19 RX ADMIN — WARFARIN SODIUM 5 MG: 5 TABLET ORAL at 18:49

## 2018-07-19 RX ADMIN — ATORVASTATIN CALCIUM 40 MG: 20 TABLET, FILM COATED ORAL at 08:44

## 2018-07-19 RX ADMIN — HEPARIN SODIUM 5000 UNITS: 5000 INJECTION, SOLUTION INTRAVENOUS; SUBCUTANEOUS at 21:21

## 2018-07-19 NOTE — PROGRESS NOTES
ARU/IPR REFERRAL CONTACT NOTE  [id number] [facility]    Re: Rosamaria Mathews Consult for IP Rehab Screen received. Presentation is suggestive of right hemispheric stroke, possibly involving the parietal lobe, looks to be secondary to cardioembolic etiology. Workup continues. Will continue follow and advise as appropriate. Thank you for the consult.     Amy Panchal

## 2018-07-19 NOTE — PROGRESS NOTES
INITIAL NUTRITION ASSESSMENT     RECOMMENDATIONS/PLAN:   Continue w/ POC  Monitor labs/lytes, PO intakes, skin integrity, wt, fluid status, BM  REASON FOR ASSESSMENT:        [x] ICU admission  NUTRITION ASSESSMENT:   Client History: 80 yrs old Female admitted with bradycardia and symptoms of stroke. SLP following pt      PMHx: arthritis, a-fib, CAD, edema, gastrointestinal disorder, high cholesterol, HTN, hypoglycemia, IBS, MI   Cultural/Protestant Food Preferences: None Identified    FOOD/NUTRITION HISTORY  Diet History: per SLP pt has had a decrease in appetite and does not like a lot of food. Food Allergies:  [x] NKFA    Pertinent PTA Medications: cardizem, reglan, lipitor, warfarin      NUTRITION INTAKE   Diet Order:  [facility] Soft      Average PO Intake:       Patient Vitals for the past 100 hrs:   % Diet Eaten   07/19/18 0906 1 %      Pertinent Medications:  [x] Reviewed; protonix,   Electrolyte Replacement Protocol: []K  []Mg  []PO4    Insulin:  [] SSI  [] Pre-meal   []  Basal   [] Drip  [] None  Pt expected to meet estimated nutrient needs through next review:          [x]  Yes     [] No;  ANTHROPOMETRICS  Height: 5' (152.4 cm)       Weight: 70 kg (154 lb 5.2 oz)    BMI: 30.1 kg/m^2  -  obese (30%-39.9% BMI)        Weight change: pt has not lost a significant amount of wt since last seen back in January 2018                                   Comparison to Reference Standards:  IBW: 100 lbs      %IBW: 154%      AdjBW: 51.6 kg    NUTRITION-FOCUSED PHYSICAL ASSESSMENT  Skin: No PU. GI: No BM    BIOCHEMICAL DATA & MEDICAL TESTS  Pertinent Labs:  [x] Reviewed; cholestrol-200 LDL-119.4      NUTRITION PRESCRIPTION  Calories: 1687 kcal/day based on [place] x 1.2 x 1.3  Protein: 56-70 g/day based on 0.8-1.0 g/kg  CHO: 211 g/day based on 50% of total energy  Fluid: 1687 ml/day based on 1 kcal/ml      NUTRITION DIAGNOSES:   1. Predicted suboptimal energy intake related to inadequate oral intake as evidence by SLP. NUTRITION INTERVENTIONS:   INTERVENTIONS:        GOALS:  1. Other:Continue w/ POC 1. Encourage PO intake >50% at most meals by next review 4 days     LEARNING NEEDS (Diet, Supplementation, Food/Nutrient-Drug Interaction):   [x] None Identified  [] Inpatient education provided/documented    [] Identified and patient:  [] Declined     [] Was not appropriate/indicated  NUTRITION MONITORING /EVALUATION:   Follow PO intake  Monitor wt  Monitor renal labs, electrolytes, fluid status  Monitor for additional supplement needs    [] Participated in Interdisciplinary Rounds  [x] [address] [place] [address] Reviewed/Documented  DISCHARGE NUTRITION RECOMMENDATIONS ADDRESSED:        [x] To be determined closer to discharge    NUTRITION RISK:     [x]  At risk                     []  Not currently at risk     Will follow-up per policy.   Octavia Ramírez, [id number] [address]

## 2018-07-19 NOTE — PROGRESS NOTES
Pharmacy Dosing Services: Famotidine    Famotidine 20 mg po BID was automatically dose-adjusted to Famotidine 20 mg po DAILY per THE [facility] P&T Committee Protocol, with respect to renal function. Pt Weight:   Wt Readings from Last 1 Encounters:   07/18/18 70 kg (154 lb 5.2 oz)     Serum Creatinine Lab Results   Component Value Date/Time    Creatinine 0.93 07/19/2018 04:13 AM       Creatinine Clearance Estimated Creatinine Clearance: 36.5 mL/min (based on Cr of 0.93). BUN Lab Results   Component Value Date/Time    BUN 10 07/19/2018 04:13 AM         Pharmacy to continue to monitor patient daily. Will make dosage adjustments based upon changing renal function. Signed Jose Antonio Oleary.

## 2018-07-19 NOTE — PROGRESS NOTES
Problem: Mobility Impaired (Adult and Pediatric)  Goal: *Acute Goals and Plan of Care (Insert Text)  Physical Therapy Goals  Initiated 7/19/2018 and to be accomplished within 3-7 day(s)  1. Patient will move from supine to sit and sit to supine  in bed with supervision/set-up. 2.  Patient will transfer from bed to chair and chair to bed with supervision/set-up using the least restrictive device. 3.  Patient will perform sit to stand with supervision/set-up. 4.  Patient will ambulate with supervision/set-up for 150 feet with the least restrictive device. 5.  Patient will ascend/descend 6 stairs with B handrail(s) with minimal assistance/contact guard assist.  Outcome: Progressing Towards Goal  physical Therapy EVALUATION    Patient: Jackie Hdz (57 y.o. female)  Date: 7/19/2018  Primary Diagnosis: Atrial fibrillation with slow ventricular response ([facility] [address]  Precautions:   Fall    ASSESSMENT :  Based on the objective data described below, the patient presents with lower extremity weakness, decreased gait quality and endurance, impaired bed mobility and transfers, and overall limitations in functional mobility. Pt performed supine to sit with Supervision/SBA, sit to stand with CG-Arcadio. Patient ambulated 50 feet with RW, GB applied, CGA. Pt tolerated session well as evidenced by no c/o increased pain, no lightheadedness or dizziness. Patient would benefit from skilled inpatient physical therapy to address deficits, progress as tolerated to achieve long term goals and allow safe discharge. Patient will benefit from skilled intervention to address the above impairments.   Patients rehabilitation potential is considered to be Good  Factors which may influence rehabilitation potential include:   []         None noted  []         Mental ability/status  [x]         Medical condition  []         Home/family situation and support systems  []         Safety awareness  [x]         Pain tolerance/management  [] Other: PLAN :  Recommendations and Planned Interventions:  [x]           Bed Mobility Training             [x]    Neuromuscular Re-Education  [x]           Transfer Training                   []    Orthotic/Prosthetic Training  [x]           Gait Training                          []    Modalities  [x]           Therapeutic Exercises          []    Edema Management/Control  [x]           Therapeutic Activities            [x]    Patient and Family Training/Education  []           Other (comment):    Frequency/Duration: Patient will be followed by physical therapy 1-2 times per day to address goals. Discharge Recommendations: Home Health vs rehab; will continue to progress based on pt progress  Further Equipment Recommendations for Discharge: rolling walker     SUBJECTIVE:   Patient stated I am feeling ok.     OBJECTIVE DATA SUMMARY:     Past Medical History:   Diagnosis Date    Arthritis     Atrial fibrillation ([facility] [address]     CAD (coronary artery disease)     Edema     Gastrointestinal disorder     High cholesterol     Hypertension     Hypoglycemia     Hypoglycemia     IBS (irritable bowel syndrome)     MI, old      Past Surgical History:   Procedure Laterality Date    HX CHOLECYSTECTOMY      HX KNEE REPLACEMENT      HX ORTHOPAEDIC      Left TKR     Barriers to Learning/Limitations: yes;  altered mental status (i.e.Sedation, Confusion)  Compensate with: Visual Cues, Verbal Cues and Tactile Cues  Prior Level of Function/Home Situation: Independent amb s/AD  Home Situation  Home Environment: Private residence  # Steps to Enter: 6  Rails to Enter: Yes  Hand Rails : Left (wide)  One/Two Story Residence: One story  Living Alone: No  Support Systems: Child(nasreen) (son)  Patient Expects to be Discharged to[de-identified] Private residence  Current DME Used/Available at Home: [employer], straight, Shower chair (no AD for ambulation)  Tub or Shower Type: Tub/Shower combination  Critical Behavior:  Neurologic State: Alert  Orientation Level: Oriented X4  Cognition: Appropriate decision making; Appropriate for age attention/concentration; Appropriate safety awareness; Follows commands  Safety/Judgement: Awareness of environment;Good awareness of safety precautions  Psychosocial  Patient Behaviors: Calm; Cooperative  Needs Expressed: Educational  Purposeful Interaction: Yes  Pt Identified Daily Priority: Clinical issues (comment)  Caritas Process: Nurture loving kindness;Enable dara/hope;Establish trust;Nurture spiritual self;Teaching/learning; Attend basic human needs;Create healing environment  Caring Interventions: Reassure  Reassure: Therapeutic listening; Informing; Acceptance; Instilling dara and hope  Therapeutic Modalities: Deep breathing;Humor; Intentional therapeutic touch  Strength:    Strength: Generally decreased, functional  Tone & Sensation:   Tone: Normal  Sensation: Intact  Range Of Motion:  AROM: Generally decreased, functional  PROM: Generally decreased, functional  Functional Mobility:  Bed Mobility:   Supine to Sit: Supervision  Sit to Supine: Supervision  Scooting: Supervision  Transfers:  Sit to Stand: Contact guard assistance  Stand to Sit: Contact guard assistance  Bed to Chair: Contact guard assistance  Balance:   Sitting: Intact  Standing: Intact; With support  Ambulation/Gait Training:  Distance (ft): 50 Feet (ft)  Assistive Device: Gait belt;Walker, rolling  Ambulation - Level of Assistance: Contact guard assistance  Gait Description (WDL): Exceptions to WDL  Gait Abnormalities: Decreased step clearance; Step to gait  Base of Support: Narrowed  Speed/Tangela: Slow  Step Length: Left shortened;Right shortened  Pain:  Pain Scale 1: Numeric (0 - 10)  Pain Intensity 1: 0  Activity Tolerance:   Good  Please refer to the flowsheet for vital signs taken during this treatment.   After treatment:   []         Patient left in no apparent distress sitting up in chair  [x]         Patient left in no apparent distress in bed  [x]         Call bell left within reach  [x]         Nursing notified  []         Caregiver present  []         Bed alarm activated    COMMUNICATION/EDUCATION:   [x]         Fall prevention education was provided and the patient/caregiver indicated understanding. [x]         Patient/family have participated as able in goal setting and plan of care. [x]         Patient/family agree to work toward stated goals and plan of care. []         Patient understands intent and goals of therapy, but is neutral about his/her participation. []         Patient is unable to participate in goal setting and plan of care. Thank you for this referral.  Fantasma Wagner   Time Calculation: 25 mins   Eval Complexity: History: MEDIUM  Complexity : 1-2 comorbidities / personal factors will impact the outcome/ POC Exam:MEDIUM Complexity : 3 Standardized tests and measures addressing body structure, function, activity limitation and / or participation in recreation  Presentation: MEDIUM Complexity : Evolving with changing characteristics  Clinical Decision Making:Medium Complexity amb >30 ft c/RW, required cues for safety and sequencing, postural cues Overall Complexity:MEDIUM Mobility  Current  CK= 40-59%   Goal  CI= 1-19%. The severity rating is based on the Level of Assistance required for Functional Mobility and ADLs.

## 2018-07-19 NOTE — PROGRESS NOTES
[facility]  Pulmonary, Critical Care, and Sleep Medicine    Pulm/CC Note    Name: Radha Muhammad MRN: [id number]   : 1930 Hospital: [facility] [place]   Date: 2018  Room: 110/     Subjective: This patient has been seen and evaluated at the request of Dr. Melva Perez for icu admission for stroke patient; slow Afib; need for pressor. Patient is a 80 y. o. female with hx of HTN, hyperlipidemia, CAD/MI and atrial fibrillation (on aspirin only, not taking coumadin), who presented with left sided upper and lower extremity weakness, slurred speech and left facial droop on 18. The patient was evaluated by tele neurology, onset was not clear and not given tPA. CT head nil acute. Patient was bradycardic with slow Afib and hypotensive - started on dopamine which is at 5 mcg/kg/min. 18  Patient awake, alert  Improved facial weakness and left sided weakness  No cough or SOB or chest pains. Tele: Afib with rate low 60s  Dopamine 3 mcg/kg/min       Past Medical History:   Diagnosis Date    Arthritis     Atrial fibrillation (HCC)     CAD (coronary artery disease)     Edema     Gastrointestinal disorder     High cholesterol     Hypertension     Hypoglycemia     Hypoglycemia     IBS (irritable bowel syndrome)     MI, old       Past Surgical History:   Procedure Laterality Date    HX CHOLECYSTECTOMY      HX KNEE REPLACEMENT      HX ORTHOPAEDIC      Left TKR      Prior to Admission medications    Medication Sig Start Date End Date Taking? Authorizing Provider   dilTIAZem CD (CARDIZEM CD) 180 mg ER capsule Take 180 mg by mouth daily. Yes Reinaldo Cordero MD   amLODIPine (NORVASC) 5 mg tablet Take 10 mg by mouth daily. Yes Reinaldo Cordero MD   losartan (COZAAR) 100 mg tablet Take 100 mg by mouth daily. Yes Reinaldo Cordero MD   aspirin 81 mg chewable tablet Take 81 mg by mouth daily.    Yes Reinaldo Cordero MD   latanoprost (XALATAN) 0.005 % ophthalmic solution Administer 1 Drop to both eyes nightly. Yes Reinaldo Cordero MD   omeprazole (PRILOSEC) 20 mg capsule Take 20 mg by mouth daily. Yes Historical Provider   METOCLOPRAMIDE HCL (REGLAN PO) Take 10 mg by mouth daily. Yes Reinaldo Cordero MD   ATORVASTATIN CALCIUM (LIPITOR PO) Take 10 mg by mouth daily. Yes Reinaldo Cordero MD   warfarin (COUMADIN) 2.5 mg tablet Take 2.5 mg by mouth daily.     Reinaldo Cordero MD     Allergies   Allergen Reactions    Amoxicillin Rash      Social History   Substance Use Topics    Smoking status: Never Smoker    Smokeless tobacco: Not on file    Alcohol use 0.5 oz/week     1 Glasses of wine per week      Comment: every few weeks        Current Facility-Administered Medications   Medication Dose Route Frequency    aspirin chewable tablet 324 mg  324 mg Oral DAILY    DOPamine (INTROPIN) 400 mg in dextrose 5% 250 mL infusion  0-20 mcg/kg/min IntraVENous TITRATE    sodium chloride (NS) flush 5-10 mL  5-10 mL IntraVENous Q8H    0.9% sodium chloride infusion  75 mL/hr IntraVENous CONTINUOUS    heparin (porcine) injection 5,000 Units  5,000 Units SubCUTAneous Q8H    atorvastatin (LIPITOR) tablet 40 mg  40 mg Oral DAILY    pantoprazole (PROTONIX) 40 mg in sodium chloride 0.9% 10 mL injection  40 mg IntraVENous DAILY       Latest lactic acid:   Lactic acid   Date Value Ref Range Status   2012 1.3 0.4 - 2.0 MMOL/L Final       Review of Systems:  Limited due to patient condition     Objective:   Vital Signs:    Visit Vitals    /60    Pulse 86    Temp 97.7 °F (36.5 °C)    Resp 19    Ht 5' (1.524 m)    Wt 70 kg (154 lb 5.2 oz)    SpO2 95%    BMI 30.14 kg/m2       O2 Device: Room air       Temp (24hrs), Av.7 °F (36.5 °C), Min:97.4 °F (36.3 °C), Max:98 °F (36.7 °C)       Intake/Output:   Last shift:         Last 3 shifts:  1901 -  0700  In: 472 [I.V.:472]  Out: -     Intake/Output Summary (Last 24 hours) at 18 1003  Last data filed at 18 0906   Gross per 24 hour   Intake           472.03 ml   Output                0 ml   Net           472.03 ml           Physical Exam:   Comfortable; on room air; acyanotic  HEENT: pupils not dilated, reactive, no scleral jaundice  Neck: No adenopathy or thyroid swelling  CVS: S1S2 no murmurs; JVD not elevated  RS: Mod air entry bilaterally, no wheezes or crackles  Abd: soft, non tender, no hepatosplenomegaly, no abd distension  Neuro: awake, alert, slight left facial droop; no left sided neglect; left UE and LE strength 4+  Extrm: no leg edema or swelling or clubbing  Skin: no rash  Lymphatic: no cervical or supraclavicular adenopathy  Psych: normal mood    Telemetry: AFIB rate 60s      Data review:     Recent Results (from the past 12 hour(s))   LIPID PANEL    Collection Time: 07/19/18  4:13 AM   Result Value Ref Range    LIPID PROFILE          Cholesterol, total 200 (H) <200 MG/DL    Triglyceride 93 <150 MG/DL    HDL Cholesterol 62 (H) 40 - 60 MG/DL    LDL, calculated 119.4 (H) 0 - 100 MG/DL    VLDL, calculated 18.6 MG/DL    CHOL/HDL Ratio 3.2 0 - 5.0     HEMOGLOBIN A1C WITH EAG    Collection Time: 07/19/18  4:13 AM   Result Value Ref Range    Hemoglobin A1c 5.0 4.5 - 5.6 %    Est. average glucose 97 mg/dL   CBC W/O DIFF    Collection Time: 07/19/18  4:13 AM   Result Value Ref Range    WBC 9.7 4.6 - 13.2 K/uL    RBC 4.84 4.20 - 5.30 M/uL    HGB 14.4 12.0 - 16.0 g/dL    HCT 44.0 35.0 - 45.0 %    MCV 90.9 74.0 - 97.0 FL    MCH 29.8 24.0 - 34.0 PG    MCHC 32.7 31.0 - 37.0 g/dL    RDW 12.9 11.6 - 14.5 %    PLATELET 205 416 - 819 K/uL    MPV 10.4 9.2 - 86.5 FL   METABOLIC PANEL, BASIC    Collection Time: 07/19/18  4:13 AM   Result Value Ref Range    Sodium 137 136 - 145 mmol/L    Potassium 3.7 3.5 - 5.5 mmol/L    Chloride 103 100 - 108 mmol/L    CO2 26 21 - 32 mmol/L    Anion gap 8 3.0 - 18 mmol/L    Glucose 117 (H) 74 - 99 mg/dL    BUN 10 7.0 - 18 MG/DL    Creatinine 0.93 0.6 - 1.3 MG/DL    BUN/Creatinine ratio 11 (L) 12 - 20      GFR est AA >60 >60 ml/min/1.73m2    GFR est non-AA 57 (L) >60 ml/min/1.73m2    Calcium 9.9 8.5 - 10.1 MG/DL   GLUCOSE, POC    Collection Time: 07/19/18  7:49 AM   Result Value Ref Range    Glucose (POC) 119 (H) 70 - 110 mg/dL             No results for input(s): FIO2I, IFO2, HCO3I, IHCO3, HCOPOC, PCO2I, PCOPOC, IPHI, PHI, PHPOC, PO2I, PO2POC in the last 72 hours. No lab exists for component: IPOC2    All Micro Results     None          ECHO   · Saline contrast was given to evaluate for intracardiac shunt. There is no evidence of intracrdiac large shunt  · Late bubbles suggest an extra cardiac shunt  · Left ventricular hyperdynamic systolic function. Estimated left ventricular ejection fraction is >70%. Please note that echo was performed when pt was on dopamine infusion 5 microgram/kg/min. · Left atrial cavity size is moderately dilated. · Right atrial cavity size is moderately dilated. · Aortic valve is trileaflet. Aortic valve sclerosis. · Mitral valve non-specific thickening. Mild to moderate mitral valve regurgitation. · Moderate tricuspid valve regurgitation is present. Pulmonary arterial systolic pressure is 42 mmHg. Moderate pulmonary hypertension is present. Imaging:  [x]I have personally reviewed the patients chest radiographs images and report       Results from Hospital Encounter encounter on 07/18/18   XR CHEST PORT   Narrative CLINICAL: Left-sided weakness. Cardiac arrhythmia. COMPARISON: January 5, 2018. A portable view of the chest from 0954 hours:    Lung fields appear clear. Minimal blunting left costophrenic angle. No focal  airspace disease. Thoracolumbar scoliosis. Mediastinal silhouette stable. Atherosclerotic calcifications in the aortic arch . Impression Impression:    Possible small left pleural effusion.  Otherwise unremarkable          Results from Hospital Encounter encounter on 07/18/18   CTA HEAD NECK W CONT   Narrative EXAM:  CT angiogram of the head and neck with intravenous contrast.    CLINICAL INDICATION/HISTORY:Left-sided weakness, slurred speech, CVA. COMPARISON: Correlation CT head same day. TECHNIQUE:  Following IV administration of nonionic contrast, helical CTA head  and neck performed. Three-dimensional, maximum intensity projection, and curved  planar reformations were post-processed at a dedicated outside facility ([facility]). Stenoses are reported with reference to the downstream lumen  (\"NASCET\"). One or more dose reduction techniques were used on this CT:  automated exposure control, adjustment of the mAs and/or kVp according to  patient's size, and iterative reconstruction techniques. The specific techniques  utilized on this CT exam have been documented in the patient's electronic  medical record.    _______________    FINDINGS:     NECK CTA:        ARTERIAL BOLUS QUALITY:  Satisfactory. AORTIC ARCH: Origin of the arch vessels not included on field of view. No  proximal great vessel stenosis. RIGHT CAROTID ARTERY:  No significant common carotid or internal carotid  artery stenosis, 0% diameter stenosis proximal right ICA. LEFT CAROTID ARTERY:  No significant common carotid or internal carotid  artery stenosis, 0% diameter stenosis proximal left ICA. VERTEBRAL ARTERIES: The vertebral arteries are codominant. RIGHT VERTEBRAL ARTERY: No stenosis or other vascular abnormality. LEFT VERTEBRAL ARTERY: No stenosis or other vascular abnormality. LUNG APICES: Mild biapical parenchymal and pleural scarring. NECK SOFT TISSUES: Heterogeneous thyroid with several small hypodensities  the largest right upper pole 8 mm. No other mass or adenopathy. OSSEOUS: Degenerative spondylosis, no high-grade canal stenosis. BRAIN CTA:        CAROTID SIPHON AND SUPRACLINOID ICA: No significant stenosis. M1 SEGMENT MCA: No significant stenosis or aneurysm.         PROXIMAL M2 SEGMENT MCA: There is thromboembolic occlusion origin inferior  segment right M2 with some reconstitution of distal vessels. There is also 7 mm  long segment of thromboembolic filling defect with severe narrowing of the  vessel involving proximal superior segment M2. Left side appears normal.        A1 SEGMENT, ANTERIOR COMMUNICATING ARTERY AND PROXIMAL A2 SEGMENTS:              Incidental normal variant single azygos A2 segment. No significant  stenosis or aneurysm. VERTEBRAL BASILAR SYSTEM:No significant stenosis or aneurysm. PCA:No significant stenosis or aneurysm. DISTAL ANTERIOR CEREBRAL ARTERY:No significant stenosis or aneurysm. DISTAL MCA M2/M3 SEGMENT:No significant stenosis or aneurysm. DURAL VENOUS SINUSES: Unremarkable. BRAIN PARENCHYMA ON SOURCE DATA: No evidence of intracranial mass or  enhancing lesion or significant mass effect. Mild asymmetric paucity of cortical  vessels peripherally right MCA territory but with collateral flow noted. _______________         Impression IMPRESSION:    1. No hemodynamically significant cervical vascular stenosis. 2. Thromboembolic occlusion inferior segment origin right M2 with reconstitution  distally, and 7 mm long thromboembolic segment involving proximal superior  segment right M2 with associated severe stenosis. 3. Remainder CTA head unremarkable. MRI Brain 7/18/18  IMPRESSION  1. Multifocal small areas of acute ischemic infarct right MCA territory  including right insula, deep white matter and posterior right frontal cortex. 2. Interval progression of moderate bilateral deep white matter signal changes  nonspecific, likely chronic microvascular ischemic disease. 3. Slight progression of mild diffuse volume loss.       IMPRESSION:   Principal Problem:    Acute CVA (cerebrovascular accident) ([address] (7/18/2018)    Active Problems:    CAD (coronary artery disease) (1/5/2018)      Hypercholesterolemia (1/5/2018) Atrial fibrillation with slow ventricular response (HCC) (7/18/2018)      SSS (sick sinus syndrome) ([facility] [address] (7/18/2018)    · HTN      RECOMMENDATIONS:   · Pulm: stable respirations; watch for aspirations; elevate head end up  · Cardiac: keep SBP>120/60 mmhg; keep HR>60/min (slow Afib); wean dopamine drip  · Neuro: ASA, statin; Neuro on case; planned for coumadin  · Fluids: /hr  · ID: no active issues  · Renal: watch IOs and renal fn  · GI: SLP team cleared for oral diet  · Hem: stable Hb and platelets  · Endo: poc glucose  · Proph:  DVT proph sc heparin - dc when INR therapeutic; GI proph pepcid  · D/w family - daughter, grand daughter and patient - updated; awaiting icu bed  Will defer respective systems problem management to primary and other consultant and follow patient in ICU with primary and other medical team  Further recommendations will be based on the patient's response to recommended treatment and results of the investigation ordered. Quality Care: PPI, DVT prophylaxis, HOB elevated, Infection control all reviewed and addressed.   PAIN AND SEDATION: none   · Skin/Wound: no active issues  · Nutrition: oral mechanical soft  · Prophylaxis: DVT and GI Prophylaxis reviewed  · Restraints: none  · PT/OT eval and treat: as needed  · Lines/Tubes: PIVs  ADVANCE DIRECTIVE: Full Code       Mod-High complexity decision making was performed in this consultation and evaluation of this patient who is at high risk for decompensation with multiple organ involvement         Anil Gamez MD

## 2018-07-19 NOTE — PROGRESS NOTES
Chart reviewed. Pt admitted to hospital for CVA. Attended IDRs. Met with pt and her daughter at bedside. Pt lives with son, Sharon Lind. Pts family drives her to appts. Pt does not drive. Pt is awaiting PT/OT eval and notes. Pt denies being active with any HH agencies. Pt states many years ago after a knee surgery she did go to rehab in [place]. Pt has a cane, that she states she does not use. CM will follow for discharge planning needs. Reason for Admission:      Per H&P, pt is a 81 y/o female here with CVA and some degree of bradycardia. Hx of atrial fibrillation and anticoagulation. INR 1.1. Currently with left sided weakness, decreased sensation, and dysarthric speech. Cardiology and neurology following. Started on Dopamine infusion. RRAT Score:     17, MOD             Do you (patient/family) have any concerns for transition/discharge? None expressed at this time              Plan for utilizing home health:     HH vs rehab    Likelihood of readmission?   mod            Transition of Care Plan:      [facility] vs REHAB    Care Management Interventions  PCP Verified by CM: Yes  Transition of Care Consult (CM Consult): Discharge Planning  Physical Therapy Consult: Yes  Occupational Therapy Consult: Yes  Speech Therapy Consult: Yes  Current Support Network: Relative's Home (lives with son, Sharon Lind)  Confirm Follow Up Transport: Family  Plan discussed with Pt/Family/Caregiver:  Yes

## 2018-07-19 NOTE — PROGRESS NOTES
Problem: Dysphagia (Adult)  Goal: *Acute Goals and Plan of Care (Insert Text)  Recommendations:  Diet: Mech-soft, chopped meat/thin   Meds: Crushed in puree  Aspiration Precautions  Oral Care TID    Goals:  Patient will:  1. Tolerate PO trials with 0 s/s overt distress in 4/5 trials  2. Utilize compensatory swallow strategies/maneuvers (decrease bite/sip, size/rate, alt. liq/sol) with min cues in 4/5 trials  3. Perform oral-motor/laryngeal exercises to increase oropharyngeal swallow function with min cues  4. Complete an objective swallow study (i.e., MBSS) to assess swallow integrity, r/o aspiration, and determine of safest LRD, min A       Outcome: Progressing Towards Goal  Speech language pathology dysphagia treatment    Patient: Dameon Roldan (40 y.o. female)  Date: 7/19/2018  Diagnosis: Atrial fibrillation with slow ventricular response (HCC) Acute CVA (cerebrovascular accident) ([facility] [address]       Precautions: Aspiration Aspiration, Fall, WBAT  PLOF: Living with family     ASSESSMENT:  Followed up this day with dysphagia tx. OT and student observer at bedside. A&Ox4. Noted improvement in dysarthric speech from previous day. Pt reported decreased appetite over past years, may benefit from dietary consult. Pt observed with thin liquid, puree and solid trials. Pt demo mildly delayed mastication with solid trial, able to clear bolus effectively given increased time. 0 overt s/sx of aspiration. Recommend mech-soft, chopped meat/thin liquid diet with aspiration precautions. D/w RNElena. Reviewed safe swallowing guidelines with pt, verbalized comprehension. ST will continue to follow as indicated.     Progression toward goals:  [x]         Improving appropriately and progressing toward goals  []         Improving slowly and progressing toward goals  []         Not making progress toward goals and plan of care will be adjusted     PLAN:  Recommendations and Planned Interventions:  Mech-soft, chopped meat/thin liquid Patient continues to benefit from skilled intervention to address the above impairments. Continue treatment per established plan of care. Discharge Recommendations: To Be Determined     SUBJECTIVE:   Patient stated I don't have an appetite. OBJECTIVE:   Cognitive and Communication Status:  Neurologic State: Alert  Orientation Level: Oriented X4  Cognition: Appropriate decision making, Appropriate for age attention/concentration, Appropriate safety awareness, Follows commands  Perception: Cues to attend left visual field  Perseveration: No perseveration noted  Safety/Judgement: Awareness of environment, Good awareness of safety precautions  Dysphagia Treatment:  Oral Assessment:  Oral Assessment  Labial: Left droop  Dentition: Upper & lower dentures  Oral Hygiene: Good  Lingual: Decreased rate  Velum: No impairment  Mandible: No impairment  P.O. Trials:   Patient Position: HOB 60   Vocal quality prior to P.O.: No impairment   Consistency Presented: Thin liquid, Solid, Puree   How Presented: Self-fed/presented, Straw       Bolus Acceptance: No impairment   Bolus Formation/Control: Impaired   Type of Impairment: Delayed, Mastication   Propulsion: No impairment   Oral Residue: None   Initiation of Swallow: No impairment   Laryngeal Elevation: Functional   Aspiration Signs/Symptoms: None   Pharyngeal Phase Characteristics: Audible swallow   Effective Modifications:  Alternate liquids/solids, Small sips and bites   Cues for Modifications: Minimal         Oral Phase Severity: Mild   Pharyngeal Phase Severity : Mild    PAIN:  Start of Tx: 0  End of Tx: 0     GCODESwallowing:  Swallow Current Status CK= 40-59%   Swallow Goal Status CI= 1-19%    The severity rating is based on the following outcomes:  LIDIA Noms Swallow Level 4    Clinical Judgement    After treatment:   []              Patient left in no apparent distress sitting up in chair  [x]              Patient left in no apparent distress in bed  [x] Call bell left within reach  [x]              Nursing notified  []              Family present  []              Caregiver present  []              Bed alarm activated      COMMUNICATION/EDUCATION:   [x] Safe swallowing guidelines; compensatory techniques  []        Patient unable to participate in education; education ongoing with staff  []  Posted safety precautions in patient's room.   [] Oral-motor/laryngeal strengthening exercises      Gamaliel Field SLP  Time Calculation: 13 mins

## 2018-07-19 NOTE — PROGRESS NOTES
1915- Bedside shift change report given to Jimmy Nguyen RN (oncoming nurse) by Matheus Joseph RN (offgoing nurse). Report included the following information SBAR, Kardex, ED Summary, Intake/Output, MAR, Accordion and Recent Results. 1930- Initial assessment complete. Pt is A&Ox4 with stable vital signs. Lung sounds are clear. No evidence of any pain. NIH Scale completed. <4 (minor stroke) L side facial drooping and weakness evident. Will continue to monitor and assess. 0000- Pt is in no apparent distress. Will continue to monitor and assess. NIH scale <5  0400- Reassessment completed. No changes to previous assessment. Will continue to monitor and assess.

## 2018-07-19 NOTE — PROGRESS NOTES
Hospitalist Progress Note Patient: Nini Serrano MRN: [id number]  CSN: [id number] YOB: 1930  Age: 80 y.o. Sex: female DOA: 7/18/2018 LOS:  LOS: 1 day Assessment/Plan Patient Active Problem List  
Diagnosis Code  Abdominal pain, other specified site R10.9  Atrial fibrillation (HCC) I48.91  
 Constipation K59.00  
 Encephalopathy, unspecified G93.40  Abdominal wall hematoma S30. Juan Borders  CAD (coronary artery disease) I25.10  Hypercholesterolemia E78.00  Hypertension I10  
 Hx of myocardial infarction I25.2  Elevated INR R79.1  Atrial fibrillation with slow ventricular response (HCC) I48.91  
 SSS (sick sinus syndrome) ([facility]) I49.5  Acute CVA (cerebrovascular accident) [facility]) I63.9  
  
 
 
 
81 yo female admitted for CVA, A-fib with slow vent response. CRITICAL CARE PLAN Resp - No acute respiratory issues ID - no evidence of infection. CVS - A-fib with slow vent response - cardizem stopped, on dopamine drip. Keep BP>120/60 On coumadin, pharmacy dosing. Heme/onc - Follow H&H, plts. INR. Renal - Trend BUN, Cr, follow I/O,  Check and replace Mg, K, phos. Endocrine -  Follow [facility] Neuro/ Pain/ Sedation - Acute CVA - continue with aspirin. GI - seen by SLP, on regular diet. Prophylaxis - DVT: on heparin, stop once INR therapeutic, GI: pepcid. 45 minutes of critical care time spent in the direct evaluation and treatment of this high risk patient. The reason for providing this level of medical care for this critically ill patient was due a critical illness that impaired one or more vital organ systems such that there was a high probability of imminent or life threatening deterioration in the patients condition.  This care involved high complexity decision making to assess, manipulate, and support vital system functions, to treat this degreee vital organ system failure and to prevent further life threatening deterioration of the patients condition. Disposition : 2-3 days Review of systems General: No fevers or chills. Cardiovascular: No chest pain or pressure. No palpitations. Pulmonary: No shortness of breath. Gastrointestinal: No nausea, vomiting. Physical Exam: 
General: Awake, cooperative, no acute distress   
HEENT: NC, Atraumatic. PERRLA, anicteric sclerae. Lungs: CTA Bilaterally. No Wheezing/Rhonchi/Rales. Heart:  Regular  rhythm,  No murmur, No Rubs, No Gallops Abdomen: Soft, Non distended, Non tender.  +Bowel sounds, Extremities: No c/c/e Psych:   Not anxious or agitated. Neurological Exam:  
Mental Status:  Alert , co-operative , memory intact, speech disarthric. Cranial Nerves:  Intact visual fields. PERRL, EOM's full, no nystagmus, no ptosis. Facial sensation is normal. Facial movement is symmetric. Palate is midline. Normal sternocleidomastoid strength. Tongue is midline. Hearing is intact bilaterally. Motor:  5/5 strength in upper and right lower proximal and distal muscles. Left side weak compare to right Normal bulk and tone. Reflexes:  Deep tendon reflexes 2+/4 and symmetrical.   
Sensory:  Normal and symmetric bilaterally. Gait:  Not tested. Cerebellar:  No cerebellar signs present. Vital signs/Intake and Output: 
Visit Vitals  /47  Pulse (!) 57  Temp 97.5 °F (36.4 °C)  Resp 12  Ht 5' (1.524 m)  Wt 70 kg (154 lb 5.2 oz)  SpO2 94%  BMI 30.14 kg/m2 Current Shift:  07/19 0701 - 07/19 1900 In: 0 Out: 500 [Urine:500] Last three shifts:  07/17 1901 - 07/19 0700 In: 472 [I.V.:472] Out: -  
 
 
 
 
 
Labs: Results:  
   
Chemistry Recent Labs  
   07/19/18 
 0413  07/18/18 
 0898 GLU  117*  129* NA  137  136  
K  3.7  4.0  
CL  103  104 CO2  26  26 BUN  10  13 CREA  0.93  1.09  
CA  9.9  9.6 AGAP  8  6 BUCR  11*  12  
AP   --   80  
TP   --   6.6 ALB   --   3.3*  
GLOB   --   3.3 AGRAT   --   1.0  
  
CBC w/Diff Recent Labs  
   07/19/18 
 0413  07/18/18 
 0850 WBC  9.7  6.9  
RBC  4.84  4.65  
HGB  14.4  13.9 HCT  44.0  42.1 PLT  251  245 GRANS   --   72  
LYMPH   --   17* EOS   --   2 Cardiac Enzymes Recent Labs  
   07/18/18 
 0850 CPK  29 CKND1  CALCULATION NOT PERFORMED WHEN RESULT IS BELOW LINEAR LIMIT Coagulation Recent Labs  
   07/18/18 
 0850 PTP  13.8 INR  1.1 APTT  29.5 Lipid Panel Lab Results Component Value Date/Time Cholesterol, total 200 (H) 07/19/2018 04:13 AM  
 HDL Cholesterol 62 (H) 07/19/2018 04:13 AM  
 LDL, calculated 119.4 (H) 07/19/2018 04:13 AM  
 VLDL, calculated 18.6 07/19/2018 04:13 AM  
 Triglyceride 93 07/19/2018 04:13 AM  
 CHOL/HDL Ratio 3.2 07/19/2018 04:13 AM  
  
BNP No results for input(s): BNPP in the last 72 hours. Liver Enzymes Recent Labs  
   07/18/18 
 0850 TP  6.6 ALB  3.3* AP  80 SGOT  16 Thyroid Studies Lab Results Component Value Date/Time TSH 1.50 07/19/2018 04:13 AM  
    
Procedures/imaging: see electronic medical records for all procedures/Xrays and details which were not copied into this note but were reviewed prior to creation of Plan

## 2018-07-19 NOTE — PROGRESS NOTES
NEUROLOGY PROGRESS NOTE        Patient: Efren Shnae        Sex: female          DOA: 7/18/2018  YOB: 1930      Age:  80 y.o.         LOS: 1 day     Identification:  [id number] right-handed female with history of hypertension, hyperlipidemia, CAD/MI and atrial fibrillation (on aspirin only), who presented with left sided weakness. SUBJECTIVE:   The patient is stronger today. No new deficits reported. Stroke Work-up:  Brain MRI: 1. Multifocal small areas of acute ischemic infarct right MCA territory including right insula, deep white matter and posterior right frontal cortex. 2. Interval progression of moderate bilateral deep white matter signal changes nonspecific, likely chronic microvascular ischemic disease. 3. Slight progression of mild diffuse volume loss. CTA of head and neck: 1. No hemodynamically significant cervical vascular stenosis. 2. Thromboembolic occlusion inferior segment origin right M2 with reconstitution distally, and 7 mm long thromboembolic segment involving proximal superior segment right M2 with associated severe stenosis. 3. Remainder CTA head unremarkable. Echocardiogram: There is no evidence of intracrdiac large shunt. Late bubbles suggest an extra cardiac shunt. Left ventricular hyperdynamic systolic function. Estimated left ventricular ejection fraction is >70%. Please note that echo was performed when pt was on dopamine infusion 5 microgram/kg/min. Left atrial cavity size is moderately dilated. Right atrial cavity size is moderately dilated. Aortic valve is trileaflet. Aortic valve sclerosis. Mitral valve non-specific thickening. Mild to moderate mitral valve regurgitation. Moderate tricuspid valve regurgitation is present. Pulmonary arterial systolic pressure is 42 mmHg. Moderate pulmonary hypertension is present.   Lipid panel:   Lab Results   Component Value Date/Time    Cholesterol, total 200 (H) 07/19/2018 04:13 AM    HDL Cholesterol 62 (H) 07/19/2018 04:13 AM    LDL, calculated 119.4 (H) 07/19/2018 04:13 AM    VLDL, calculated 18.6 07/19/2018 04:13 AM    Triglyceride 93 07/19/2018 04:13 AM    CHOL/HDL Ratio 3.2 07/19/2018 04:13 AM     HbA1c:   Lab Results   Component Value Date/Time    Hemoglobin A1c 5.0 07/19/2018 04:13 AM     REVIEW OF SYSTEMS: Denies chest pain, abdominal pain, nausea or vomiting. No fever or chills. OBJECTIVE:      Visit Vitals    /63    Pulse 76    Temp 97.7 °F (36.5 °C)    Resp 13    Ht 5' (1.524 m)    Wt 70 kg (154 lb 5.2 oz)    SpO2 92%    BMI 30.14 kg/m2     Physical Exam:  GEN: Alert, NAD  EYES: conjunctiva normal, lids with out lesions  HEENT: MMM. HEART: RRR +S1 +S2  LUNGS: CTA B/L no rales or rhonchi. ABDOMEN: Soft, non-tender. EXTREMITIES: No edema cyanosis  SKIN: no rashes or skin breakdown, no nodules  NEURO: Alert, oriented x3. Speech is slightly dysarthric. No neglect today. Cranials: Face: left facial weakness (better than yesterday). EOMI, VFF. Tongue is midline. Motor: Left arm and leg drift, 4/5. Right side full. Sensation slightly diminished on the left.     Current Facility-Administered Medications   Medication Dose Route Frequency    DOPamine (INTROPIN) 400 mg in dextrose 5% 250 mL infusion  0-20 mcg/kg/min IntraVENous TITRATE    sodium chloride (NS) flush 5-10 mL  5-10 mL IntraVENous Q8H    sodium chloride (NS) flush 5-10 mL  5-10 mL IntraVENous PRN    0.9% sodium chloride infusion  75 mL/hr IntraVENous CONTINUOUS    aspirin chewable tablet 81 mg  81 mg Oral DAILY    heparin (porcine) injection 5,000 Units  5,000 Units SubCUTAneous Q8H    atorvastatin (LIPITOR) tablet 40 mg  40 mg Oral DAILY    pantoprazole (PROTONIX) 40 mg in sodium chloride 0.9% 10 mL injection  40 mg IntraVENous DAILY       Laboratory  Recent Results (from the past 24 hour(s))   CBC WITH AUTOMATED DIFF    Collection Time: 07/18/18  8:50 AM   Result Value Ref Range    WBC 6.9 4.6 - 13.2 K/uL    RBC 4.65 4.20 - 5.30 M/uL HGB 13.9 12.0 - 16.0 g/dL    HCT 42.1 35.0 - 45.0 %    MCV 90.5 74.0 - 97.0 FL    MCH 29.9 24.0 - 34.0 PG    MCHC 33.0 31.0 - 37.0 g/dL    RDW 12.8 11.6 - 14.5 %    PLATELET 547 677 - 508 K/uL    MPV 10.3 9.2 - 11.8 FL    NEUTROPHILS 72 40 - 73 %    LYMPHOCYTES 17 (L) 21 - 52 %    MONOCYTES 9 3 - 10 %    EOSINOPHILS 2 0 - 5 %    BASOPHILS 0 0 - 2 %    ABS. NEUTROPHILS 5.0 1.8 - 8.0 K/UL    ABS. LYMPHOCYTES 1.2 0.9 - 3.6 K/UL    ABS. MONOCYTES 0.6 0.05 - 1.2 K/UL    ABS. EOSINOPHILS 0.1 0.0 - 0.4 K/UL    ABS. BASOPHILS 0.0 0.0 - 0.1 K/UL    DF AUTOMATED     METABOLIC PANEL, COMPREHENSIVE    Collection Time: 07/18/18  8:50 AM   Result Value Ref Range    Sodium 136 136 - 145 mmol/L    Potassium 4.0 3.5 - 5.5 mmol/L    Chloride 104 100 - 108 mmol/L    CO2 26 21 - 32 mmol/L    Anion gap 6 3.0 - 18 mmol/L    Glucose 129 (H) 74 - 99 mg/dL    BUN 13 7.0 - 18 MG/DL    Creatinine 1.09 0.6 - 1.3 MG/DL    BUN/Creatinine ratio 12 12 - 20      GFR est AA 57 (L) >60 ml/min/1.73m2    GFR est non-AA 47 (L) >60 ml/min/1.73m2    Calcium 9.6 8.5 - 10.1 MG/DL    Bilirubin, total 0.5 0.2 - 1.0 MG/DL    ALT (SGPT) 18 13 - 56 U/L    AST (SGOT) 16 15 - 37 U/L    Alk.  phosphatase 80 45 - 117 U/L    Protein, total 6.6 6.4 - 8.2 g/dL    Albumin 3.3 (L) 3.4 - 5.0 g/dL    Globulin 3.3 2.0 - 4.0 g/dL    A-G Ratio 1.0 0.8 - 1.7     PTT    Collection Time: 07/18/18  8:50 AM   Result Value Ref Range    aPTT 29.5 23.0 - 36.4 SEC   PROTHROMBIN TIME + INR    Collection Time: 07/18/18  8:50 AM   Result Value Ref Range    Prothrombin time 13.8 11.5 - 15.2 sec    INR 1.1 0.8 - 1.2     CARDIAC PANEL,(CK, CKMB & TROPONIN)    Collection Time: 07/18/18  8:50 AM   Result Value Ref Range    CK 29 26 - 192 U/L    CK - MB <1.0 <3.6 ng/ml    CK-MB Index  0.0 - 4.0 %     CALCULATION NOT PERFORMED WHEN RESULT IS BELOW LINEAR LIMIT    Troponin-I, Qt. <0.02 0.00 - 0.06 NG/ML   DIGOXIN    Collection Time: 07/18/18  8:50 AM   Result Value Ref Range    Digoxin level <0.2 (L) 0.9 - 2.0 NG/ML   HEMOGLOBIN A1C W/O EAG    Collection Time: 07/18/18  8:50 AM   Result Value Ref Range    Hemoglobin A1c 5.2 4.5 - 5.6 %   EKG, 12 LEAD, INITIAL    Collection Time: 07/18/18  8:58 AM   Result Value Ref Range    Ventricular Rate 43 BPM    Atrial Rate 74 BPM    QRS Duration 90 ms    Q-T Interval 472 ms    QTC Calculation (Bezet) 398 ms    Calculated R Axis -32 degrees    Calculated T Axis 133 degrees    Diagnosis       Atrial fibrillation with slow ventricular response with premature ventricular   or aberrantly conducted complexes  Left axis deviation  Anteroseptal infarct (cited on or before 08-FEB-2013)  ST & T wave abnormality, consider lateral ischemia or digitalis effect  Abnormal ECG  When compared with ECG of 05-JAN-2018 10:17,  Vent.  rate has decreased BY  22 BPM  T wave inversion more evident in Lateral leads     ECHO ADULT COMPLETE    Collection Time: 07/18/18  3:37 PM   Result Value Ref Range    AO ASC D 3.34 cm    AO ARCH D 2.76 cm    Aortic Valve Systolic Peak Velocity 472.56 cm/s    AoV VTI 36.38 cm    Aortic Valve Area by Continuity of Peak Velocity 2.2 cm2    Aortic Valve Area by Continuity of VTI 2.3 cm2    AoV PG 12.9 mmHg    LVIDd 4.08 3.9 - 5.3 cm    LVPWd 0.94 (A) 0.6 - 0.9 cm    LVIDs 2.15 cm    IVSd 0.93 (A) 0.6 - 0.9 cm    LV ED Vol A2C 49.7 mL    LV ES Vol A4C 22.2 mL    LV ES Vol BP 17.0 (A) 19 - 49 mL    LVOT d 2.05 cm    LVOT Peak Velocity 121.08 cm/s    LVOT Peak Gradient 5.9 mmHg    LVOT VTI 25.57 cm    RVIDd 3.75 cm    Aortic Valve Systolic Mean Gradient 7.5 mmHg    BP EF 74.2 55 - 100 %    LV Ejection Fraction MOD 4C 73 %    LV Ejection Fraction MOD 2C 75 %    Inferior Vena Cava Diameter Sniffing 1.36 cm    LA Vol 4C 64.13 (A) 22 - 52 mL    LA Vol 2C 71.53 (A) 22 - 52 mL    LV Mass .6 67 - 162 g    LV Mass AL Index 81.5 g/m2    LV ES Vol A2C 12.4 mL    LVES Vol Index BP 10.3 mL/m2    LV ED Vol A4C 82.0 mL    LVED Vol Index BP 40.0 mL/m2    Left Atrium Major Axis 3.65 cm    Triscuspid Valve Regurgitation Peak Gradient 38.5 mmHg    LV ED Vol BP 66.1 56 - 104 ml    TR Max Velocity 310.41 cm/s    LA Vol Index 43.30 ml/m2    LA Vol Index 38.82 ml/m2    LVED Vol Index A4C 49.6 mL/m2    LVED Vol Index A2C 30.1 mL/m2    LVES Vol Index A4C 13.4 mL/m2    LVES Vol Index A2C 7.5 mL/m2    Ao Root D 3.00 cm    SEAN/BSA Pk Colin 1.3 cm2/m2    SEAN/BSA VTI 1.4 cm2/m2    LA Volume 76.0 22 - 52 mL    Right Atrial Area 4C 19.8 cm2    Tapse 2.10 1.5 - 2.0 cm    LA Vol Index 46.01 ml/m2   GLUCOSE, POC    Collection Time: 07/18/18  9:53 PM   Result Value Ref Range    Glucose (POC) 116 (H) 70 - 110 mg/dL   LIPID PANEL    Collection Time: 07/19/18  4:13 AM   Result Value Ref Range    LIPID PROFILE          Cholesterol, total 200 (H) <200 MG/DL    Triglyceride 93 <150 MG/DL    HDL Cholesterol 62 (H) 40 - 60 MG/DL    LDL, calculated 119.4 (H) 0 - 100 MG/DL    VLDL, calculated 18.6 MG/DL    CHOL/HDL Ratio 3.2 0 - 5.0     HEMOGLOBIN A1C WITH EAG    Collection Time: 07/19/18  4:13 AM   Result Value Ref Range    Hemoglobin A1c 5.0 4.5 - 5.6 %    Est. average glucose 97 mg/dL   CBC W/O DIFF    Collection Time: 07/19/18  4:13 AM   Result Value Ref Range    WBC 9.7 4.6 - 13.2 K/uL    RBC 4.84 4.20 - 5.30 M/uL    HGB 14.4 12.0 - 16.0 g/dL    HCT 44.0 35.0 - 45.0 %    MCV 90.9 74.0 - 97.0 FL    MCH 29.8 24.0 - 34.0 PG    MCHC 32.7 31.0 - 37.0 g/dL    RDW 12.9 11.6 - 14.5 %    PLATELET 513 151 - 610 K/uL    MPV 10.4 9.2 - 17.6 FL   METABOLIC PANEL, BASIC    Collection Time: 07/19/18  4:13 AM   Result Value Ref Range    Sodium 137 136 - 145 mmol/L    Potassium 3.7 3.5 - 5.5 mmol/L    Chloride 103 100 - 108 mmol/L    CO2 26 21 - 32 mmol/L    Anion gap 8 3.0 - 18 mmol/L    Glucose 117 (H) 74 - 99 mg/dL    BUN 10 7.0 - 18 MG/DL    Creatinine 0.93 0.6 - 1.3 MG/DL    BUN/Creatinine ratio 11 (L) 12 - 20      GFR est AA >60 >60 ml/min/1.73m2    GFR est non-AA 57 (L) >60 ml/min/1.73m2    Calcium 9.9 8.5 - 10.1 MG/DL GLUCOSE, POC    Collection Time: 07/19/18  7:49 AM   Result Value Ref Range    Glucose (POC) 119 (H) 70 - 110 mg/dL       Radiology:  Mri Brain Wo Cont    Result Date: 7/18/2018  History: Left-sided facial droop and slurred speech, CVA Correlation CTA head and neck same day and comparison MRI 4/13/2013 PROCEDURE: Axial spin echo T1, FSE T2, FLAIR, T2 gradient and diffusion sequences, sagittal spin echo T1, and coronal spin echo T1 and T2 sequences of the brain were obtained without gadolinium. FINDINGS:  Diffusion:  There are multifocal small areas of restricted diffusion signal involving right insular cortex, right corona radiata, and cortex and subcortical white matter of posterior right frontal lobe. Brain parenchyma: There has been progression of moderate confluent and multifocal increased T2 and FLAIR signal periventricular and deep cerebral white matter including subcortical white matter since prior MRI. No evidence of new intracranial mass or hemorrhage or mass effect. No other new cortical signal abnormality. Ventricles and sulci:  Slight increased mild diffuse central and cortical volume loss. Extra-axial:  No extra-axial fluid collection or mass is noted. Brain vasculature:  No vascular abnormality is appreciated on this routine brain MR examination. Craniocervical junction:  Normal. Skull base, extracranial and calvarium:  Stable mild increased T2 signal inferior right mastoid. Orbits, paranasal sinuses, IACs sella and skull unremarkable. Impression: 1. Multifocal small areas of acute ischemic infarct right MCA territory including right insula, deep white matter and posterior right frontal cortex. 2. Interval progression of moderate bilateral deep white matter signal changes nonspecific, likely chronic microvascular ischemic disease. 3. Slight progression of mild diffuse volume loss.     Ct Head Wo Cont    Result Date: 7/18/2018  EXAM: CT head INDICATION: Left-sided weakness, slurred speech since 8:00 AM. CODE S. COMPARISON: 04/12/13. TECHNIQUE: Axial CT imaging of the head  was obtained from skull base to vertex without intravenous contrast. One or more dose reduction techniques were used on this CT: automated exposure control, adjustment of the mAs and/or kVp according to patient's size, and iterative reconstruction techniques. The specific techniques utilized on this CT exam have been documented in the patient's electronic medical record. _______________ FINDINGS: BRAIN AND POSTERIOR FOSSA: The sulci, folia, ventricles and basal cisterns are mildly prominent. Patchy periventricular, subcortical and deep white matter hypodensities. Stable well-circumscribed hypodensity in the right basal ganglia. . No acute intracranial hemorrhage, cortical infarct or mass effect/mass lesion. EXTRA-AXIAL SPACES AND MENINGES: There are no abnormal extra-axial fluid collections. CALVARIUM: No acute osseous abnormality. Polina Chi SINUSES: The visualized portions of the paranasal sinuses and mastoid air cells are well aerated. OTHER: None. _______________     IMPRESSION: 1. Progressive white matter findings, nonspecific but typically reflecting progressive chronic ischemic change in a patient of this age. Stable right basal ganglia lacunar infarct. Mild parenchymal volume loss/atrophy. 2. No CT evidence of acute intracranial hematoma, cortical infarct, or mass effect/mass lesion. Please note that noncontrast head CT can be negative in the setting of acute/subacute ischemia/infarct, and in the high index of clinical suspicion, MRI could best further assess. Critical result call to Dr. Sher Alanis in ER, at 8:50 AM on 07/18/18, as per stroke alert/CODE S protocol. Xr Chest Port    Result Date: 7/18/2018  CLINICAL: Left-sided weakness. Cardiac arrhythmia. COMPARISON: January 5, 2018. A portable view of the chest from 0954 hours: Lung fields appear clear. Minimal blunting left costophrenic angle. No focal airspace disease.  Thoracolumbar scoliosis. Mediastinal silhouette stable. Atherosclerotic calcifications in the aortic arch . Impression: Possible small left pleural effusion. Otherwise unremarkable    Cta Head Neck W Cont    Result Date: 7/18/2018  EXAM:  CT angiogram of the head and neck with intravenous contrast. CLINICAL INDICATION/HISTORY:Left-sided weakness, slurred speech, CVA. COMPARISON: Correlation CT head same day. TECHNIQUE:  Following IV administration of nonionic contrast, helical CTA head and neck performed. Three-dimensional, maximum intensity projection, and curved planar reformations were post-processed at a dedicated outside facility ([employer]). Stenoses are reported with reference to the downstream lumen (\"NASCET\"). One or more dose reduction techniques were used on this CT: automated exposure control, adjustment of the mAs and/or kVp according to patient's size, and iterative reconstruction techniques. The specific techniques utilized on this CT exam have been documented in the patient's electronic medical record. _______________ FINDINGS: NECK CTA:      ARTERIAL BOLUS QUALITY:  Satisfactory. AORTIC ARCH: Origin of the arch vessels not included on field of view. No proximal great vessel stenosis. RIGHT CAROTID ARTERY:  No significant common carotid or internal carotid artery stenosis, 0% diameter stenosis proximal right ICA. LEFT CAROTID ARTERY:  No significant common carotid or internal carotid artery stenosis, 0% diameter stenosis proximal left ICA. VERTEBRAL ARTERIES: The vertebral arteries are codominant. RIGHT VERTEBRAL ARTERY: No stenosis or other vascular abnormality. LEFT VERTEBRAL ARTERY: No stenosis or other vascular abnormality. LUNG APICES: Mild biapical parenchymal and pleural scarring. NECK SOFT TISSUES: Heterogeneous thyroid with several small hypodensities the largest right upper pole 8 mm. No other mass or adenopathy.       OSSEOUS: Degenerative spondylosis, no high-grade canal stenosis. BRAIN CTA:      CAROTID SIPHON AND SUPRACLINOID ICA: No significant stenosis. M1 SEGMENT MCA: No significant stenosis or aneurysm. PROXIMAL M2 SEGMENT MCA: There is thromboembolic occlusion origin inferior segment right M2 with some reconstitution of distal vessels. There is also 7 mm long segment of thromboembolic filling defect with severe narrowing of the vessel involving proximal superior segment M2. Left side appears normal.      A1 SEGMENT, ANTERIOR COMMUNICATING ARTERY AND PROXIMAL A2 SEGMENTS:           Incidental normal variant single azygos A2 segment. No significant stenosis or aneurysm. VERTEBRAL BASILAR SYSTEM:No significant stenosis or aneurysm. PCA:No significant stenosis or aneurysm. DISTAL ANTERIOR CEREBRAL ARTERY:No significant stenosis or aneurysm. DISTAL MCA M2/M3 SEGMENT:No significant stenosis or aneurysm. DURAL VENOUS SINUSES: Unremarkable. BRAIN PARENCHYMA ON SOURCE DATA: No evidence of intracranial mass or enhancing lesion or significant mass effect. Mild asymmetric paucity of cortical vessels peripherally right MCA territory but with collateral flow noted. _______________     IMPRESSION: 1. No hemodynamically significant cervical vascular stenosis. 2. Thromboembolic occlusion inferior segment origin right M2 with reconstitution distally, and 7 mm long thromboembolic segment involving proximal superior segment right M2 with associated severe stenosis. 3. Remainder CTA head unremarkable. ASSESSMENT/IMPRESSION:   Right MCA infarcts, cardioembolic in nature. The patient needs to be on anticoagulation for secondary prevention. RECOMMENDATIONS:  1. Aspirin 325mg po now until INR reaches >2.0. Continue atorvastatin 40mg daily. 2. We will initiate Coumadin with a goal INR between 2.0 and 3.0  3. Neuro checks per stroke protocol  4.  Permissive hypertension (do not treat if BP is not >200/110, prefer prn labetolol 5mg)  5. IV normal saline continuous infusion 100-125 ml/h  6. Euglycemia with sliding scale insulin  7. Euthermia with acetaminophen 650mg Q6h prn for fever  8. PT/OT and speech assessment. 9. OK to transfer to telemetry. I will follow the patient. Please do not hesitate to return with any questions.     Signed:  Charli Gibson MD  7/19/2018  8:00 AM

## 2018-07-19 NOTE — PROGRESS NOTES
Cardiology Progress Note        Patient: Micky Hartman        Sex: female          DOA: 7/18/2018  YOB: 1930      Age:  80 y.o.        LOS:  LOS: 1 day   Assessment/Plan     Principal Problem:    Acute CVA (cerebrovascular accident) ([facility] (7/18/2018)    Active Problems:    CAD (coronary artery disease) (1/5/2018)      Hypercholesterolemia (1/5/2018)      Atrial fibrillation with slow ventricular response ([facility] [address] (7/18/2018)      SSS (sick sinus syndrome) ([facility] [address] (7/18/2018)        Plan:  Wean off dopamine slowly  Continue to monitor in ICU  Discussed with Dr. Rina Kaminski  Discussed with patient & daughter. Subjective:    cc:  Bradycardia  Hypotension  Stroke  afib  SSS        REVIEW OF SYSTEMS:     General: No fevers or chills. Cardiovascular: No chest pain or pressure. No palpitations. No ankle swelling  Pulmonary: No SOB, orthopnea, PND  Gastrointestinal: No nausea, vomiting or diarrhea      Objective:      Visit Vitals    /60    Pulse 86    Temp 97.7 °F (36.5 °C)    Resp 19    Ht 5' (1.524 m)    Wt 70 kg (154 lb 5.2 oz)    SpO2 95%    BMI 30.14 kg/m2     Body mass index is 30.14 kg/(m^2). Physical Exam:  General Appearance: Comfortable, not using accessory muscles of respiration. NECK: No JVD, no thyroidomeglay. LUNGS: Clear bilaterally. HEART: S1 variable +S2 audible,    ABD: Non-tender, BS Audible    EXT: No edema, and no cysnosis. VASCULAR EXAM: Pulses are intact. PSYCHIATRIC EXAM: Mood is appropriate. MUSCULOSKELETAL: Grossly no joint deformity. NEUROLOGICAL: alert oriented in time, place and person.   Medication:  Current Facility-Administered Medications   Medication Dose Route Frequency    aspirin chewable tablet 324 mg  324 mg Oral DAILY    DOPamine (INTROPIN) 400 mg in dextrose 5% 250 mL infusion  0-20 mcg/kg/min IntraVENous TITRATE    sodium chloride (NS) flush 5-10 mL  5-10 mL IntraVENous Q8H    sodium chloride (NS) flush 5-10 mL  5-10 mL IntraVENous PRN    0.9% sodium chloride infusion  75 mL/hr IntraVENous CONTINUOUS    heparin (porcine) injection 5,000 Units  5,000 Units SubCUTAneous Q8H    atorvastatin (LIPITOR) tablet 40 mg  40 mg Oral DAILY    pantoprazole (PROTONIX) 40 mg in sodium chloride 0.9% 10 mL injection  40 mg IntraVENous DAILY               Lab/Data Reviewed:  Procedures/imaging: see electronic medical records for all procedures/Xrays   and details which were not copied into this note but were reviewed prior to creation of Plan       All lab results for the last 24 hours reviewed.      Recent Labs      07/19/18   0413  07/18/18   0850   WBC  9.7  6.9   HGB  14.4  13.9   HCT  44.0  42.1   PLT  251  245     Recent Labs      07/19/18   0413  07/18/18   0850   NA  137  136   K  3.7  4.0   CL  103  104   CO2  26  26   GLU  117*  129*   BUN  10  13   CREA  0.93  1.09   CA  9.9  9.6       Signed By: Thera Sicard, MD     July 19, 2018

## 2018-07-19 NOTE — PROGRESS NOTES
We will keep permissive hypertension. BP goals >110/60. Do not treat if not >200/110. Needs to be in ICU for close monitoring. Thrombus seen on CTA is not a candidate for intervention. Discussed the case with Dr. Allen Bone.     ARMANI

## 2018-07-19 NOTE — PROGRESS NOTES
0725 - Bedside and Verbal shift change report given to Noé Encarnacion RN (oncoming nurse) by John Terrell RN (offgoing nurse). Report included the following information SBAR, Kardex, Procedure Summary, Intake/Output, MAR, Recent Results, Med Rec Status and Cardiac Rhythm Afib slow. Dopanie at 4mcg/kg/min  0800 - Pt assessed and vitals obtained. NIH stroke scale 3 at this time. Dr Arely Henson at the bedside, OT at the bedside, Speech at the bedside. Pt can answer all questions, some minor mix ups that are corrected by pt after some time, minimal dysarthria,with uneven smile. No acute distress, denies all pain, shortness of breath, and nausea. Will continue to titrate dopamine and will continue to monitor, see EMR for full details. 0900 - Dr Arely Henson increase aspirin until coumadin is theraputic, OT states pt can get up to bedside comode, Speech advances diet to mech soft. 12 - Dr Shorty Castro at the bedside - increases fluids to 100ml/hr, continue to try and ween dopamine (keep HR greater than 60 and BP greater than 120/60), ICU until dopamine is off.   1025 - Dr Sukhjinder Polanco at the bedside - no new orders at this time  1045 - IDR: Pt to receive PT, continue with current plan of care, transfer when off dopamine. 1200 - Pt assessed and vitals obtained. No acute changes, pt remains alert oriented with minimal mix ups, smile is still uneven and minor dysarthria at times. Continuing to wean dopamine, at [facility] currently. Will continue to monitor, see EMR for full details. 1425 - PT in to see pt, pt up with walker ambulating in hallway. 1600 - Pt resting in bed comfortably - while sleeping pt needs dopamine increased back to 3mcg/kg/min. No acute distress, face remains weaker on the left than the right, speech seems less slurred, minimal dysarthria is still present. Will continue to attempt to wean dopamine, and will continue to monitor, see EMR for full details. 1615 - New BP parameters received from Dr Arely Henson.  Keep BP above 110.  1830 - Pt given coumadin and explained need for anticoagulation, son and granddaughter at the bedside  1925 - Bedside and Verbal shift change report given to Zulma Heredia RN (oncoming nurse) by Valentino Glade RN (offgoing nurse). Report included the following information SBAR, Kardex, Intake/Output, MAR, Accordion, Recent Results and Cardiac Rhythm Afib.  Dopamine at 3mcg/kg/min

## 2018-07-19 NOTE — PROGRESS NOTES
Problem: Self Care Deficits Care Plan (Adult)  Goal: *Acute Goals and Plan of Care (Insert Text)  Initial OT STGs (7/19/2018) Within 7 days:    1. Patient will perform self-feeding with setup and trace spillage for increased independence with ADLs. 2. Patient will perform all aspects of toileting with S/mod I for increased independence with ADLs. 3. Patient will perform UB dressing with setup while utilizing nichole-techniques for increased independence with ADLs. 4. Patient will perform LB dressing with setup (except socks) while utilizing nichole-techniques for increased independence with ADLs. 5. Patient will visually locate ADL items in all quadrants with 1 verbal cues in preparation for ADLs. 6. Patient will perform UE exercises with Standby assist for 15 minutes to increase independence with ADLs. 7. Patient will utilize energy conservation techniques with 1 verbal cue(s) for increased independence with ADLs. Outcome: Progressing Towards Goal  Occupational Therapy EVALUATION    Patient: Delma Mathew (70 y.o. female)  Date: 7/19/2018  Primary Diagnosis: Atrial fibrillation with slow ventricular response (HCC)        Precautions:  Aspiration, Fall, WBAT    ASSESSMENT :  Based on the objective data described below, the patient presents with decreased functional strength, decreased functional balance, decreased overall activity tolerance limiting independence with ADLs. Patient pleasant and cooperative, requiring increased time to initiate commands. Pt w/ grossly neutral gaze, but able to identify in periphery at ~45 degree angle. Pt able to visually track object w/ fairly smooth scanning, but during observation, pt keeps more of a neutral gaze w/ limited visual scanning. Pt reports assist w/ socks from son. Pt requiring assist w/ donning undergarment, but able to manage during toileting. Pt would benefit from continued OT services to maximize independence in ADLs.     Education: Reviewed home safety, body mechanics, signs and symptoms of a stroke, risk factors, blood sugar management in relation to stroke, and adaptive dressing techniques with patient verbalizing understanding at this time. Stroke Education: Patient educated on signs/symptoms of stroke and importance of early intervention. Patient verbalized understanding. Patient will benefit from skilled intervention to address the above impairments. Patients rehabilitation potential is considered to be Good  Factors which may influence rehabilitation potential include:   []             None noted  [x]             Mental ability/status  [x]             Medical condition  []             Home/family situation and support systems  [x]             Safety awareness  []             Pain tolerance/management  []             Other:      PLAN :  Recommendations and Planned Interventions:  [x]               Self Care Training                  [x]        Therapeutic Activities  [x]               Functional Mobility Training    [x]        Cognitive Retraining  [x]               Therapeutic Exercises           [x]        Endurance Activities  [x]               Balance Training                   [x]        Neuromuscular Re-Education  [x]               Visual/Perceptual Training     [x]   Home Safety Training  []               Patient Education                 [x]        Family Training/Education  []               Other (comment):    Frequency/Duration: Patient will be followed by occupational therapy 1-2 times per day/3-5 days per week to address goals. Discharge Recommendations: Home Health  Further Equipment Recommendations for Discharge: To Be Determined (TBD) at next level of care      SUBJECTIVE:   Patient stated I'm not much of an eater.     OBJECTIVE DATA SUMMARY:     Past Medical History:   Diagnosis Date    Arthritis     Atrial fibrillation ([address]     CAD (coronary artery disease)     Edema     Gastrointestinal disorder     High cholesterol     Hypertension     Hypoglycemia     Hypoglycemia     IBS (irritable bowel syndrome)     MI, old      Past Surgical History:   Procedure Laterality Date    HX CHOLECYSTECTOMY      HX KNEE REPLACEMENT      HX ORTHOPAEDIC      Left TKR     Barriers to Learning/Limitations: yes;  cognitive  Compensate with: visual, verbal, tactile, kinesthetic cues/model    Prior Level of Function/Home Situation: supportive family  Home Situation  Home Environment: Private residence  # Steps to Enter: 6  Rails to Enter: Yes  Hand Rails : Left (wide)  One/Two Story Residence: One story  Living Alone: No  Support Systems: Child(nasreen) (son)  Patient Expects to be Discharged to[de-identified] Private residence  Current DME Used/Available at Home: Cane, straight, Shower chair (no AD for ambulation)  Tub or Shower Type: Tub/Shower combination  [x]  Right hand dominant   []  Left hand dominant    Cognitive/Behavioral Status:  Neurologic State: Alert  Orientation Level: Oriented X4  Cognition: Decreased attention/concentration;Decreased command following;Poor safety awareness  Safety/Judgement: Decreased awareness of need for safety;Decreased insight into deficits    Skin: appears grossly intact  Edema: no significant edema noted    Vision/Perceptual:    Tracking: Able to track stimulus in all quadrants w/o difficulty    Diplopia: No      Coordination:  Coordination: Within functional limits  Fine Motor Skills-Upper: Left Intact; Right Intact    Gross Motor Skills-Upper: Left Intact; Right Intact    Balance:  Sitting: Intact  Standing: Intact; With support    Strength:  Strength: Generally decreased, functional  Tone & Sensation:  Tone: Normal  Sensation: Intact  Range of Motion:  AROM: Generally decreased, functional  PROM: Generally decreased, functional    Functional Mobility and Transfers for ADLs:  Bed Mobility:  Supine to Sit: Supervision  Sit to Supine: Supervision  Scooting: Supervision  Transfers:  Sit to Stand: Contact guard assistance  Bed to Chair: Contact guard assistance   Toilet Transfer : Contact guard assistance   Bathroom Mobility: Contact guard assistance    ADL Assessment:   Feeding: Setup    Oral Facial Hygiene/Grooming: Contact guard assistance    Bathing: Minimum assistance; Additional time    Upper Body Dressing: Contact guard assistance    Lower Body Dressing: Minimum assistance; Additional time    Toileting: Contact guard assistance    ADL Intervention:  Feeding  Feeding Assistance: Supervision/set-up  Food to Mouth: Supervision/set-up  Drink to Mouth: Supervision/set-up    Grooming  Washing Hands: Contact guard assistance    Lower Body Dressing Assistance  Underpants: Minimum assistance; Moderate assistance  Socks: Moderate assistance (pt reports son assists at baseline)  Position Performed: Seated edge of bed;Bending forward method    Toileting  Toileting Assistance: Contact guard assistance  Bladder Hygiene: Contact guard assistance  Clothing Management: Contact guard assistance  Adaptive Equipment:  (BSC d/t urgency)    Cognitive Retraining  Orientation Retraining: Reorienting  Problem Solving: Inductive reason; Identifying the task; Identifying the problem;General alternative solution;Deductive reason  Executive Functions: Executing cognitive plans  Organizing/Sequencing: Breaking task down;Prioritizing  Attention to Task: Distractibility; Single task  Following Commands: Awareness of environment; Follows one step commands/directions  Safety/Judgement: Decreased awareness of need for safety;Decreased insight into deficits  Cues: Tactile cues provided;Verbal cues provided;Visual cues provided    Pain:  Pre-treatment: 0/10  Post-treatment: 0/10    Activity Tolerance:   Patient able to stand 1-2 minute(s). Patient able to complete ADLs with frequent rest breaks. Patient limited by cognition/strength/balance. Patient unsteady     Please refer to the flowsheet for vital signs taken during this treatment.   After treatment:   [] Patient left in no apparent distress sitting up in chair  [x] Patient left in no apparent distress in bed  [] Call bell left within reach  [x] Nursing notified/Fior  [] Caregiver present  [x] Bed alarm activated    COMMUNICATION/EDUCATION:   [x] Home safety education was provided and the patient/caregiver indicated understanding. [x] Patient/family have participated as able in goal setting and plan of care. [x] Patient/family agree to work toward stated goals and plan of care. [] Patient understands intent and goals of therapy, but is neutral about his/her participation. [] Patient is unable to participate in goal setting and plan of care. Thank you for this referral.  Tierney Montaño, OTR/L  Time Calculation: 54 mins    G-Codes (GP)  Self Care   Current  CJ= 20-39%   Goal  CJ= 20-39%  The severity rating is based on the professional judgement & direct observation of Level of Assistance required for Functional Mobility and ADLs. Eval Complexity: History: HIGH Complexity : Extensive review of history including physical, cognitive and psychosocial history ; Examination: HIGH Complexity : 5 or more performance deficits relating to physical, cognitive , or psychosocial skils that result in activity limitations and / or participation restrictions; Decision Making:HIGH Complexity : Patient presents with comorbidities that affect occupational performance.  Signifigant modification of tasks or assistance (eg, physical or verbal) with assessment (s) is necessary to enable patient to complete evaluation

## 2018-07-19 NOTE — PROGRESS NOTES
Pharmacy Dosing Services: Warfarin     Consult for Warfarin Dosing by Pharmacy by Dr. Parker Dumont provided for this 80 y.o.  female , for indication of stroke. Day of Therapy 1  Dose to achieve an INR goal of 2-3    Order entered for  Warfarin 5 mg to be given today at 18:00. Heparin 5000 units sc q8h  Aspirin 324 mg po daily     PT/INR Lab Results   Component Value Date/Time    INR 1.1 07/18/2018 08:50 AM      Platelets Lab Results   Component Value Date/Time    PLATELET 566 97/61/0205 04:13 AM      H/H     Lab Results   Component Value Date/Time    HGB 14.4 07/19/2018 04:13 AM        Warfarin Administrations (last 168 hours)       None          Pharmacy to follow daily and will provide subsequent Warfarin dosing based on clinical status.   Hakan Sandoval, [address])  Contact information [phone]

## 2018-07-20 ENCOUNTER — APPOINTMENT (OUTPATIENT)
Dept: INTERVENTIONAL RADIOLOGY/VASCULAR | Age: 83
DRG: 065 | End: 2018-07-20
Attending: INTERNAL MEDICINE
Payer: MEDICARE

## 2018-07-20 LAB
INR PPP: 1.1 (ref 0.8–1.2)
PROTHROMBIN TIME: 13.6 SEC (ref 11.5–15.2)

## 2018-07-20 PROCEDURE — 97530 THERAPEUTIC ACTIVITIES: CPT

## 2018-07-20 PROCEDURE — 85610 PROTHROMBIN TIME: CPT | Performed by: PSYCHIATRY & NEUROLOGY

## 2018-07-20 PROCEDURE — 74011250636 HC RX REV CODE- 250/636: Performed by: INTERNAL MEDICINE

## 2018-07-20 PROCEDURE — 02HV33Z INSERTION OF INFUSION DEVICE INTO SUPERIOR VENA CAVA, PERCUTANEOUS APPROACH: ICD-10-PCS | Performed by: RADIOLOGY

## 2018-07-20 PROCEDURE — 92526 ORAL FUNCTION THERAPY: CPT

## 2018-07-20 PROCEDURE — 74011250637 HC RX REV CODE- 250/637: Performed by: INTERNAL MEDICINE

## 2018-07-20 PROCEDURE — 77010033678 HC OXYGEN DAILY

## 2018-07-20 PROCEDURE — 76937 US GUIDE VASCULAR ACCESS: CPT

## 2018-07-20 PROCEDURE — 36415 COLL VENOUS BLD VENIPUNCTURE: CPT | Performed by: PSYCHIATRY & NEUROLOGY

## 2018-07-20 PROCEDURE — 74011250636 HC RX REV CODE- 250/636: Performed by: RADIOLOGY

## 2018-07-20 PROCEDURE — 65610000006 HC RM INTENSIVE CARE

## 2018-07-20 PROCEDURE — 74011250637 HC RX REV CODE- 250/637: Performed by: PSYCHIATRY & NEUROLOGY

## 2018-07-20 PROCEDURE — 77030032490 HC SLV COMPR SCD KNE COVD -B

## 2018-07-20 PROCEDURE — 97116 GAIT TRAINING THERAPY: CPT

## 2018-07-20 PROCEDURE — 74011000250 HC RX REV CODE- 250: Performed by: RADIOLOGY

## 2018-07-20 RX ORDER — LIDOCAINE HYDROCHLORIDE 10 MG/ML
1-10 INJECTION INFILTRATION; PERINEURAL
Status: COMPLETED | OUTPATIENT
Start: 2018-07-20 | End: 2018-07-20

## 2018-07-20 RX ORDER — HEPARIN SODIUM 200 [USP'U]/100ML
500 INJECTION, SOLUTION INTRAVENOUS
Status: COMPLETED | OUTPATIENT
Start: 2018-07-20 | End: 2018-07-20

## 2018-07-20 RX ORDER — ATORVASTATIN CALCIUM 20 MG/1
80 TABLET, FILM COATED ORAL DAILY
Status: DISCONTINUED | OUTPATIENT
Start: 2018-07-20 | End: 2018-07-28 | Stop reason: HOSPADM

## 2018-07-20 RX ORDER — WARFARIN SODIUM 5 MG/1
5 TABLET ORAL ONCE
Status: COMPLETED | OUTPATIENT
Start: 2018-07-20 | End: 2018-07-20

## 2018-07-20 RX ORDER — SODIUM CHLORIDE 9 MG/ML
75 INJECTION, SOLUTION INTRAVENOUS CONTINUOUS
Status: DISCONTINUED | OUTPATIENT
Start: 2018-07-20 | End: 2018-07-26

## 2018-07-20 RX ORDER — HEPARIN SODIUM (PORCINE) LOCK FLUSH IV SOLN 100 UNIT/ML 100 UNIT/ML
500 SOLUTION INTRAVENOUS
Status: COMPLETED | OUTPATIENT
Start: 2018-07-20 | End: 2018-07-20

## 2018-07-20 RX ADMIN — Medication 10 ML: at 13:47

## 2018-07-20 RX ADMIN — HEPARIN SODIUM (PORCINE) LOCK FLUSH IV SOLN 100 UNIT/ML 500 UNITS: 100 SOLUTION at 10:41

## 2018-07-20 RX ADMIN — Medication 10 ML: at 21:36

## 2018-07-20 RX ADMIN — HEPARIN SODIUM 1000 UNITS: 200 INJECTION, SOLUTION INTRAVENOUS at 10:35

## 2018-07-20 RX ADMIN — HEPARIN SODIUM 5000 UNITS: 5000 INJECTION, SOLUTION INTRAVENOUS; SUBCUTANEOUS at 21:36

## 2018-07-20 RX ADMIN — ATORVASTATIN CALCIUM 80 MG: 20 TABLET, FILM COATED ORAL at 08:54

## 2018-07-20 RX ADMIN — LIDOCAINE HYDROCHLORIDE 2 ML: 10 INJECTION, SOLUTION INFILTRATION; PERINEURAL at 10:35

## 2018-07-20 RX ADMIN — SODIUM CHLORIDE 75 ML/HR: 900 INJECTION, SOLUTION INTRAVENOUS at 08:55

## 2018-07-20 RX ADMIN — HEPARIN SODIUM 5000 UNITS: 5000 INJECTION, SOLUTION INTRAVENOUS; SUBCUTANEOUS at 13:47

## 2018-07-20 RX ADMIN — SODIUM CHLORIDE 100 ML/HR: 900 INJECTION, SOLUTION INTRAVENOUS at 08:52

## 2018-07-20 RX ADMIN — Medication 10 ML: at 06:09

## 2018-07-20 RX ADMIN — ASPIRIN 81 MG 324 MG: 81 TABLET ORAL at 08:54

## 2018-07-20 RX ADMIN — SODIUM CHLORIDE 75 ML/HR: 900 INJECTION, SOLUTION INTRAVENOUS at 21:31

## 2018-07-20 RX ADMIN — WARFARIN SODIUM 5 MG: 5 TABLET ORAL at 17:43

## 2018-07-20 RX ADMIN — DOPAMINE HYDROCHLORIDE 5 MCG/KG/MIN: 160 INJECTION, SOLUTION INTRAVENOUS at 08:51

## 2018-07-20 RX ADMIN — HEPARIN SODIUM 5000 UNITS: 5000 INJECTION, SOLUTION INTRAVENOUS; SUBCUTANEOUS at 05:58

## 2018-07-20 RX ADMIN — FAMOTIDINE 20 MG: 20 TABLET ORAL at 08:54

## 2018-07-20 NOTE — ROUTINE PROCESS
Bedside and Verbal shift change report given to Brayan Lawrence RN (oncoming nurse) by Guido Torrez RN (offgoing nurse). Report included the following information SBAR, Kardex, Intake/Output, MAR, Recent Results, Med Rec Status and Cardiac Rhythm AFib.

## 2018-07-20 NOTE — PROGRESS NOTES
[facility]  Pulmonary, Critical Care, and Sleep Medicine    Pulm/CC Note    Name: Sheryle Maw MRN: [id number]   : 1930 Hospital: [facility] [place]   Date: 2018  Room: [facility]/     Subjective: This patient has been seen and evaluated at the request of Dr. Marcela Hilliard for icu admission for stroke patient; slow Afib; need for pressor. Patient is a 80 y. o. female with hx of HTN, hyperlipidemia, CAD/MI and atrial fibrillation (on aspirin only, not taking coumadin), who presented with left sided upper and lower extremity weakness, slurred speech and left facial droop on 18. The patient was evaluated by tele neurology, onset was not clear and not given tPA. CT head nil acute. Patient was bradycardic with slow Afib and hypotensive - started on dopamine which is at 5 mcg/kg/min.    18  Patient awake, alert  Resp stable - no SOB  Improved facial weakness and left sided weakness  No cough or chest pains. Tele: Afib with rate low 60s  Dopamine 5 mcg/kg/min       Past Medical History:   Diagnosis Date    Arthritis     Atrial fibrillation (HCC)     CAD (coronary artery disease)     Edema     Gastrointestinal disorder     High cholesterol     Hypertension     Hypoglycemia     Hypoglycemia     IBS (irritable bowel syndrome)     MI, old       Past Surgical History:   Procedure Laterality Date    HX CHOLECYSTECTOMY      HX KNEE REPLACEMENT      HX ORTHOPAEDIC      Left TKR      Prior to Admission medications    Medication Sig Start Date End Date Taking? Authorizing Provider   dilTIAZem CD (CARDIZEM CD) 180 mg ER capsule Take 180 mg by mouth daily. Yes Reinaldo Cordero MD   amLODIPine (NORVASC) 5 mg tablet Take 10 mg by mouth daily. Yes Reinaldo Cordero MD   losartan (COZAAR) 100 mg tablet Take 100 mg by mouth daily. Yes Reinaldo Cordero MD   aspirin 81 mg chewable tablet Take 81 mg by mouth daily.    Yes Reinaldo Cordero MD   latanoprost (XALATAN) 0.005 % ophthalmic solution Administer 1 Drop to both eyes nightly. Yes Reinaldo Cordero, MD   omeprazole (PRILOSEC) 20 mg capsule Take 20 mg by mouth daily. Yes Historical Provider   METOCLOPRAMIDE HCL (REGLAN PO) Take 10 mg by mouth daily. Yes Reinaldo Cordero MD   ATORVASTATIN CALCIUM (LIPITOR PO) Take 10 mg by mouth daily. Yes Reinaldo Cordero MD   warfarin (COUMADIN) 2.5 mg tablet Take 2.5 mg by mouth daily.     Reinaldo Other, MD     Allergies   Allergen Reactions    Amoxicillin Rash      Social History   Substance Use Topics    Smoking status: Never Smoker    Smokeless tobacco: Not on file    Alcohol use 0.5 oz/week     1 Glasses of wine per week      Comment: every few weeks        Current Facility-Administered Medications   Medication Dose Route Frequency    atorvastatin (LIPITOR) tablet 80 mg  80 mg Oral DAILY    aspirin chewable tablet 324 mg  324 mg Oral DAILY    DOPamine (INTROPIN) 400 mg in dextrose 5% 250 mL infusion  0-20 mcg/kg/min IntraVENous TITRATE    0.9% sodium chloride infusion  100 mL/hr IntraVENous CONTINUOUS    famotidine (PEPCID) tablet 20 mg  20 mg Oral DAILY    Warfarin - Pharmacy to dose    Other Rx Dosing/Monitoring    sodium chloride (NS) flush 5-10 mL  5-10 mL IntraVENous Q8H    heparin (porcine) injection 5,000 Units  5,000 Units SubCUTAneous Q8H       Latest lactic acid:   Lactic acid   Date Value Ref Range Status   2012 1.3 0.4 - 2.0 MMOL/L Final       Review of Systems:  Limited due to patient condition     Objective:   Vital Signs:    Visit Vitals    /57    Pulse 64    Temp 97.5 °F (36.4 °C)    Resp 13    Ht 5' (1.524 m)    Wt 70 kg (154 lb 5.2 oz)    SpO2 98%    BMI 30.14 kg/m2       O2 Device: Nasal cannula   O2 Flow Rate (L/min): 2 l/min   Temp (24hrs), Av.7 °F (36.5 °C), Min:97.5 °F (36.4 °C), Max:98.1 °F (36.7 °C)       Intake/Output:   Last shift:      701 - 1900  In: 180 [P.O.:180]  Out: -   Last 3 shifts: 1901 -  07  In: 995.7 [I.V.:995.7]  Out: 1475 [Urine:1475]    Intake/Output Summary (Last 24 hours) at 07/20/18 0852  Last data filed at 07/20/18 8520   Gross per 24 hour   Intake          1175.73 ml   Output             1225 ml   Net           -49.27 ml           Physical Exam:   Comfortable; on room air; acyanotic  HEENT: pupils not dilated, reactive, no scleral jaundice  Neck: No adenopathy or thyroid swelling  CVS: S1S2 no murmurs; JVD not elevated  RS: Mod air entry bilaterally, no wheezes or crackles  Abd: soft, non tender, no hepatosplenomegaly, no abd distension  Neuro: awake, alert, slight left facial droop; no left sided neglect; left UE and LE strength 4+  Extrm: no leg edema or swelling or clubbing  Skin: no rash  Lymphatic: no cervical or supraclavicular adenopathy  Psych: normal mood    Telemetry: AFIB rate 60s      Data review:     Recent Results (from the past 12 hour(s))   PROTHROMBIN TIME + INR    Collection Time: 07/20/18  3:40 AM   Result Value Ref Range    Prothrombin time 13.6 11.5 - 15.2 sec    INR 1.1 0.8 - 1.2               No results for input(s): FIO2I, IFO2, HCO3I, IHCO3, HCOPOC, PCO2I, PCOPOC, IPHI, PHI, PHPOC, PO2I, PO2POC in the last 72 hours. No lab exists for component: IPOC2    All Micro Results     None          ECHO   · Saline contrast was given to evaluate for intracardiac shunt. There is no evidence of intracrdiac large shunt  · Late bubbles suggest an extra cardiac shunt  · Left ventricular hyperdynamic systolic function. Estimated left ventricular ejection fraction is >70%. Please note that echo was performed when pt was on dopamine infusion 5 microgram/kg/min. · Left atrial cavity size is moderately dilated. · Right atrial cavity size is moderately dilated. · Aortic valve is trileaflet. Aortic valve sclerosis. · Mitral valve non-specific thickening. Mild to moderate mitral valve regurgitation. · Moderate tricuspid valve regurgitation is present.  Pulmonary arterial systolic pressure is 42 mmHg. Moderate pulmonary hypertension is present. Imaging:  [x]I have personally reviewed the patients chest radiographs images and report       Results from Hospital Encounter encounter on 07/18/18   XR CHEST PORT   Narrative CLINICAL: Left-sided weakness. Cardiac arrhythmia. COMPARISON: January 5, 2018. A portable view of the chest from 0954 hours:    Lung fields appear clear. Minimal blunting left costophrenic angle. No focal  airspace disease. Thoracolumbar scoliosis. Mediastinal silhouette stable. Atherosclerotic calcifications in the aortic arch . Impression Impression:    Possible small left pleural effusion. Otherwise unremarkable          Results from Hospital Encounter encounter on 07/18/18   CTA HEAD NECK W CONT   Narrative EXAM:  CT angiogram of the head and neck with intravenous contrast.    CLINICAL INDICATION/HISTORY:Left-sided weakness, slurred speech, CVA. COMPARISON: Correlation CT head same day. TECHNIQUE:  Following IV administration of nonionic contrast, helical CTA head  and neck performed. Three-dimensional, maximum intensity projection, and curved  planar reformations were post-processed at a dedicated outside facility ([employer]). Stenoses are reported with reference to the downstream lumen  (\"NASCET\"). One or more dose reduction techniques were used on this CT:  automated exposure control, adjustment of the mAs and/or kVp according to  patient's size, and iterative reconstruction techniques. The specific techniques  utilized on this CT exam have been documented in the patient's electronic  medical record.    _______________    FINDINGS:     NECK CTA:        ARTERIAL BOLUS QUALITY:  Satisfactory. AORTIC ARCH: Origin of the arch vessels not included on field of view. No  proximal great vessel stenosis. RIGHT CAROTID ARTERY:  No significant common carotid or internal carotid  artery stenosis, 0% diameter stenosis proximal right ICA. LEFT CAROTID ARTERY:  No significant common carotid or internal carotid  artery stenosis, 0% diameter stenosis proximal left ICA. VERTEBRAL ARTERIES: The vertebral arteries are codominant. RIGHT VERTEBRAL ARTERY: No stenosis or other vascular abnormality. LEFT VERTEBRAL ARTERY: No stenosis or other vascular abnormality. LUNG APICES: Mild biapical parenchymal and pleural scarring. NECK SOFT TISSUES: Heterogeneous thyroid with several small hypodensities  the largest right upper pole 8 mm. No other mass or adenopathy. OSSEOUS: Degenerative spondylosis, no high-grade canal stenosis. BRAIN CTA:        CAROTID SIPHON AND SUPRACLINOID ICA: No significant stenosis. M1 SEGMENT MCA: No significant stenosis or aneurysm. PROXIMAL M2 SEGMENT MCA: There is thromboembolic occlusion origin inferior  segment right M2 with some reconstitution of distal vessels. There is also 7 mm  long segment of thromboembolic filling defect with severe narrowing of the  vessel involving proximal superior segment M2. Left side appears normal.        A1 SEGMENT, ANTERIOR COMMUNICATING ARTERY AND PROXIMAL A2 SEGMENTS:              Incidental normal variant single azygos A2 segment. No significant  stenosis or aneurysm. VERTEBRAL BASILAR SYSTEM:No significant stenosis or aneurysm. PCA:No significant stenosis or aneurysm. DISTAL ANTERIOR CEREBRAL ARTERY:No significant stenosis or aneurysm. DISTAL MCA M2/M3 SEGMENT:No significant stenosis or aneurysm. DURAL VENOUS SINUSES: Unremarkable. BRAIN PARENCHYMA ON SOURCE DATA: No evidence of intracranial mass or  enhancing lesion or significant mass effect. Mild asymmetric paucity of cortical  vessels peripherally right MCA territory but with collateral flow noted. _______________         Impression IMPRESSION:    1. No hemodynamically significant cervical vascular stenosis.     2. Thromboembolic occlusion inferior segment origin right M2 with reconstitution  distally, and 7 mm long thromboembolic segment involving proximal superior  segment right M2 with associated severe stenosis. 3. Remainder CTA head unremarkable. MRI Brain 7/18/18  IMPRESSION  1. Multifocal small areas of acute ischemic infarct right MCA territory  including right insula, deep white matter and posterior right frontal cortex. 2. Interval progression of moderate bilateral deep white matter signal changes  nonspecific, likely chronic microvascular ischemic disease. 3. Slight progression of mild diffuse volume loss. IMPRESSION:   Principal Problem:    Acute CVA (cerebrovascular accident) ([address] (7/18/2018)    Active Problems:    CAD (coronary artery disease) (1/5/2018)      Hypercholesterolemia (1/5/2018)      Atrial fibrillation with slow ventricular response ([address] (7/18/2018)      SSS (sick sinus syndrome) ([facility] [address] [facility] (7/18/2018)    · HTN      RECOMMENDATIONS:   · Pulm: stable respirations; aspiration precautions  · Cardiac: keep SBP>120/60 mmhg; keep HR>60/min (slow Afib); wean dopamine drip  · Neuro: ASA, statin; Neuro on case; planned for coumadin therapy  · Fluids: NS 75/hr  · ID: no active issues  · Renal: watch IOs and renal fn  · GI: SLP team cleared for oral diet  · Hem: stable Hb and platelets  · Endo: poc glucose  · Proph:  DVT proph sc heparin - dc when INR therapeutic; GI proph pepcid  · D/w family - daughter, grand daughter and patient - updated; awaiting icu bed  Will defer respective systems problem management to primary and other consultant and follow patient in ICU with primary and other medical team  Further recommendations will be based on the patient's response to recommended treatment and results of the investigation ordered. Quality Care: PPI, DVT prophylaxis, HOB elevated, Infection control all reviewed and addressed.   PAIN AND SEDATION: none   · Skin/Wound: no active issues  · Nutrition: oral mechanical soft  · Prophylaxis: DVT and GI Prophylaxis reviewed  · Restraints: none  · PT/OT eval and treat: as needed  · Lines/Tubes: PIVs  ADVANCE DIRECTIVE: Full Code  Ok to tele once she is off dopamine     Mod complexity decision making was performed in this consultation and evaluation of this patient who is at high risk for decompensation with multiple organ involvement         Georges Ramirez MD

## 2018-07-20 NOTE — PROGRESS NOTES
1930: Bedside shift change report received from Anjana Garcia RN, Report included the following information SBAR, Intake/Output, MAR, Recent Results and Cardiac Rhythm Afib .  2000: Assessment complete, see flow sheets. Family at the bedside. NAD. Resting. 2130: Pt assisted to bedside commode. Transfers well with assistance. 2315: Dopamin gtt titrated up to 4 mcg/kg/min, pt HR 50's when sleeping. See MAR. Applied 2 L NC O2 saturation 88-90%. Pt resting. No other needs expressed at this time. 0000: Reassessment, no changes. 0130: Pt assisted to bedside commode. Tolerated well. CHG bath given. 0400: Reassessment, no changes. 0600: Pt up to bedside commode. Stable, NAD.   0723: Bedside and Verbal shift change report given to RUDOLPH Knutson RN (oncoming nurse) by Mayte Portillo RN   (offgoing nurse). Report included the following information SBAR, Intake/Output, MAR, Accordion, Med Rec Status and Cardiac Rhythm afib.

## 2018-07-20 NOTE — PROGRESS NOTES
Pt arrived on unit; Pt Alert and Oriented; Consent signed;. Pt transferred to treatment table for procedure.

## 2018-07-20 NOTE — PROGRESS NOTES
Problem: Falls - Risk of  Goal: *Absence of Falls  Document Steve Fall Risk and appropriate interventions in the flowsheet.    Outcome: Progressing Towards Goal  Fall Risk Interventions:  Mobility Interventions: Patient to call before getting OOB         Medication Interventions: Evaluate medications/consider consulting pharmacy, Patient to call before getting OOB, Teach patient to arise slowly    Elimination Interventions: Patient to call for help with toileting needs, Toileting schedule/hourly rounds, Call light in reach    History of Falls Interventions: Evaluate medications/consider consulting pharmacy

## 2018-07-20 NOTE — PROGRESS NOTES
Pharmacy Dosing Services: Warfarin    Consult for Warfarin Dosing by Pharmacy by Dr. Baljeet Fisher provided for this 80 y.o.  female , for indication of CVA. Day of Therapy 02  Dose to achieve an INR goal of 2-3    Order entered for  Warfarin  5 mg to be given today at 18:00. Heparin SQ 5,000 units q8   mg every day    LABS    PT/INR Lab Results   Component Value Date/Time    INR 1.1 07/20/2018 03:40 AM      Platelets Lab Results   Component Value Date/Time    PLATELET 347 95/47/8891 04:13 AM      H/H     Lab Results   Component Value Date/Time    HGB 14.4 07/19/2018 04:13 AM        Warfarin Administrations (last 168 hours)       Date/Time Action Medication Dose      07/19/18 1849 Given    warfarin (COUMADIN) tablet 5 mg 5 mg          Pharmacy to follow daily and will provide subsequent Warfarin dosing based on clinical status. Naomi Garcia, Pharm. D.   Clinical Pharmacist  [phone]

## 2018-07-20 NOTE — PROGRESS NOTES
Problem: Pressure Injury - Risk of  Goal: *Prevention of pressure injury  Document Akira Scale and appropriate interventions in the flowsheet. Outcome: Progressing Towards Goal  Pressure Injury Interventions:  Sensory Interventions: Assess changes in LOC, Avoid rigorous massage over bony prominences, Discuss PT/OT consult with provider, Float heels, Keep linens dry and wrinkle-free, Minimize linen layers, Pressure redistribution bed/mattress (bed type)    Moisture Interventions: Absorbent underpads, Offer toileting Q_hr    Activity Interventions: Increase time out of bed, Pressure redistribution bed/mattress(bed type), PT/OT evaluation    Mobility Interventions: Pressure redistribution bed/mattress (bed type), PT/OT evaluation    Nutrition Interventions: Document food/fluid/supplement intake, Discuss nutritional consult with provider, Offer support with meals,snacks and hydration    Friction and Shear Interventions: Minimize layers, Lift sheet               Problem: Falls - Risk of  Goal: *Absence of Falls  Document Steve Fall Risk and appropriate interventions in the flowsheet.    Outcome: Progressing Towards Goal  Fall Risk Interventions:  Mobility Interventions: Assess mobility with egress test, Patient to call before getting OOB, PT Consult for mobility concerns, PT Consult for assist device competence, Strengthening exercises (ROM-active/passive)         Medication Interventions: Evaluate medications/consider consulting pharmacy, Patient to call before getting OOB    Elimination Interventions: Bed/chair exit alarm, Call light in reach, Patient to call for help with toileting needs, Toileting schedule/hourly rounds    History of Falls Interventions: Evaluate medications/consider consulting pharmacy

## 2018-07-20 NOTE — PROGRESS NOTES
1100--Patient taken to IR for PICC line insertion. Patient tolerated procedure well. All new lines for NS and Dopamine gtt. 1349--Dopamine gtt decreased to 2.5mcg. Dr. Cha Soas okay with HR > 50. BP stable. 1431--Dopamine gtt increased to 5mcg. Patient started having some bradycardia into the 40s and BP 99/54(61). Dr. Kendrick Arnold does want some permissive HTN, so medication was increased. 1930--Bedside and Verbal shift change report given to Thalia Ramírez RN (oncoming nurse) by Arvin Alfonso RN (offgoing nurse). Report included the following information SBAR, Kardex, Intake/Output, MAR, Recent Results, Med Rec Status and Cardiac Rhythm AFib.

## 2018-07-20 NOTE — PROGRESS NOTES
NEUROLOGY PROGRESS NOTE        Patient: Heather Navarro        Sex: female          DOA: 7/18/2018  YOB: 1930      Age:  80 y.o.         LOS: 2 days     Identification:  [id number] right-handed female with history of hypertension, hyperlipidemia, CAD/MI and atrial fibrillation (on aspirin only), who presented with left sided weakness.      SUBJECTIVE:   The patient is stable with no new weakness or numbness. Discussed the case with Dr. Salter President. She is not a candidate for any intervention at this time.     Stroke Work-up:  Brain MRI: 1. Multifocal small areas of acute ischemic infarct right MCA territory including right insula, deep white matter and posterior right frontal cortex. 2. Interval progression of moderate bilateral deep white matter signal changes nonspecific, likely chronic microvascular ischemic disease. 3. Slight progression of mild diffuse volume loss. CTA of head and neck: 1. No hemodynamically significant cervical vascular stenosis. 2. Thromboembolic occlusion inferior segment origin right M2 with reconstitution distally, and 7 mm long thromboembolic segment involving proximal superior segment right M2 with associated severe stenosis. 3. Remainder CTA head unremarkable. Echocardiogram: There is no evidence of intracrdiac large shunt. Late bubbles suggest an extra cardiac shunt. Left ventricular hyperdynamic systolic function. Estimated left ventricular ejection fraction is >70%. Please note that echo was performed when pt was on dopamine infusion 5 microgram/kg/min. Left atrial cavity size is moderately dilated. Right atrial cavity size is moderately dilated. Aortic valve is trileaflet. Aortic valve sclerosis. Mitral valve non-specific thickening. Mild to moderate mitral valve regurgitation. Moderate tricuspid valve regurgitation is present. Pulmonary arterial systolic pressure is 42 mmHg. Moderate pulmonary hypertension is present.   Lipid panel:   Lab Results   Component Value Date/Time     Cholesterol, total 200 (H) 07/19/2018 04:13 AM     HDL Cholesterol 62 (H) 07/19/2018 04:13 AM     LDL, calculated 119.4 (H) 07/19/2018 04:13 AM     VLDL, calculated 18.6 07/19/2018 04:13 AM     Triglyceride 93 07/19/2018 04:13 AM     CHOL/HDL Ratio 3.2 07/19/2018 04:13 AM      HbA1c:         Lab Results   Component Value Date/Time     Hemoglobin A1c 5.0 07/19/2018 04:13 AM      REVIEW OF SYSTEMS: Denies chest pain, abdominal pain, nausea or vomiting. No fever or chills. OBJECTIVE:      Visit Vitals    /57    Pulse 64    Temp 97.5 °F (36.4 °C)    Resp 13    Ht 5' (1.524 m)    Wt 70 kg (154 lb 5.2 oz)    SpO2 98%    BMI 30.14 kg/m2     Physical Exam:  GEN: Alert, NAD  EYES: conjunctiva normal, lids with out lesions  HEENT: MMM. HEART: RRR +S1 +S2  LUNGS: CTA B/L no rales or rhonchi. ABDOMEN: Soft, non-tender. EXTREMITIES: No edema cyanosis  SKIN: no rashes or skin breakdown, no nodules  NEURO: Alert, oriented x3. Speech is fluent. Slight left facial weakness with smile. PERRL. VFF. Left arm and leg drift. Sensation is symmetric to touch. There is no neglect.     Current Facility-Administered Medications   Medication Dose Route Frequency    aspirin chewable tablet 324 mg  324 mg Oral DAILY    DOPamine (INTROPIN) 400 mg in dextrose 5% 250 mL infusion  0-20 mcg/kg/min IntraVENous TITRATE    0.9% sodium chloride infusion  100 mL/hr IntraVENous CONTINUOUS    famotidine (PEPCID) tablet 20 mg  20 mg Oral DAILY    Warfarin - Pharmacy to dose    Other Rx Dosing/Monitoring    sodium chloride (NS) flush 5-10 mL  5-10 mL IntraVENous Q8H    sodium chloride (NS) flush 5-10 mL  5-10 mL IntraVENous PRN    heparin (porcine) injection 5,000 Units  5,000 Units SubCUTAneous Q8H    atorvastatin (LIPITOR) tablet 40 mg  40 mg Oral DAILY       Laboratory  Recent Results (from the past 24 hour(s))   GLUCOSE, POC    Collection Time: 07/19/18 11:12 AM   Result Value Ref Range    Glucose (POC) 123 (H) 70 - 110 mg/dL   GLUCOSE, POC    Collection Time: 07/19/18  4:25 PM   Result Value Ref Range    Glucose (POC) 106 70 - 110 mg/dL   PROTHROMBIN TIME + INR    Collection Time: 07/20/18  3:40 AM   Result Value Ref Range    Prothrombin time 13.6 11.5 - 15.2 sec    INR 1.1 0.8 - 1.2       ASSESSMENT/IMPRESSION:   Right MCA infarcts, cardioembolic in nature. The patient needs to be on anticoagulation for secondary prevention.      RECOMMENDATIONS:  1. Aspirin 325mg po now until INR reaches >2.0. Increase atorvastatin to 80mg daily. 2. Continue Coumadin with a goal INR between 2.0 and 3.0  3. Neuro checks per stroke protocol  4. Permissive hypertension (do not treat if BP is not >200/110, prefer prn labetolol 5mg)  5. IV normal saline continuous infusion 100-125 ml/h  6. Euglycemia with sliding scale insulin  7. Euthermia with acetaminophen 650mg Q6h prn for fever  8. PT/OT and speech assessment. I will follow the patient. Please do not hesitate to return with any questions.     Signed:  Lor Meza MD  7/20/2018  7:51 AM

## 2018-07-20 NOTE — PROGRESS NOTES
Problem: Mobility Impaired (Adult and Pediatric)  Goal: *Acute Goals and Plan of Care (Insert Text)  Physical Therapy Goals  Initiated 7/19/2018 and to be accomplished within 3-7 day(s)  1. Patient will move from supine to sit and sit to supine  in bed with supervision/set-up. 2.  Patient will transfer from bed to chair and chair to bed with supervision/set-up using the least restrictive device. 3.  Patient will perform sit to stand with supervision/set-up. 4.  Patient will ambulate with supervision/set-up for 150 feet with the least restrictive device. 5.  Patient will ascend/descend 6 stairs with B handrail(s) with minimal assistance/contact guard assist.   Outcome: Not Progressing Towards Goal  physical Therapy TREATMENT    Patient: Eufemia Mckoy (56 y.o. female)  Date: 7/20/2018  Diagnosis: Atrial fibrillation with slow ventricular response (HCC) Acute CVA (cerebrovascular accident) [facility])       Precautions: Fall   Chart, physical therapy assessment, plan of care and goals were reviewed. ASSESSMENT:  Pt is able to increase OOB activity and is fairly safe during ambulation. Pt fatigues quickly, but pt identifies decreasing strength. Pt agrees to sit in armed chair for meal consumption. Son present, RN and CNA are aware of session outcome. HR range 75-114BPM    Progression toward goals:  []      Improving appropriately and progressing toward goals  [x]      Improving slowly and progressing toward goals  []      Not making progress toward goals and plan of care will be adjusted     PLAN:  Patient continues to benefit from skilled intervention to address the above impairments. Continue treatment per established plan of care. Discharge Recommendations:  Home Health or TBD  Further Equipment Recommendations for Discharge:  bedside commode and rolling walker     SUBJECTIVE:   Patient stated I just sit .     OBJECTIVE DATA SUMMARY:   Critical Behavior:  Neurologic State: Alert  Orientation Level: Oriented X4  Cognition: Appropriate decision making, Appropriate for age attention/concentration, Appropriate safety awareness, Follows commands  Safety/Judgement: Awareness of environment, Good awareness of safety precautions  Functional Mobility Training:  Bed Mobility:  Supine to Sit: Supervision  Sit to Supine: Supervision  Transfers:  Sit to Stand: Contact guard assistance  Stand to Sit: Contact guard assistance  Balance:  Sitting: Intact  Standing: Intact; With support  Ambulation/Gait Training:  Distance (ft): 120 Feet (ft)  Assistive Device: Walker, rolling;Gait belt  Ambulation - Level of Assistance: Contact guard assistance  Gait Abnormalities: Decreased step clearance; Step to gait  Base of Support: Narrowed  Speed/Tangela: Slow  Step Length: Left shortened;Right shortened  Pain:  Pain Scale 1: Numeric (0 - 10)  Pain Intensity 1: 0  Pain out: 5  Activity Tolerance:   Good   Please refer to the flowsheet for vital signs taken during this treatment.   After treatment:   [] Patient left in no apparent distress sitting up in chair  [x] Patient left in no apparent distress in bed  [x] Call bell left within reach  [x] Nursing notified  [] Caregiver present  [] Bed alarm activated      Oneal Lopez PTA   Time Calculation: 35 mins

## 2018-07-20 NOTE — PROGRESS NOTES
Cardiology Progress Note        Patient: Julieta Buckner        Sex: female          DOA: 7/18/2018  YOB: 1930      Age:  80 y.o.        LOS:  LOS: 2 days   Assessment/Plan     Principal Problem:    Acute CVA (cerebrovascular accident) ([facility] (7/18/2018)    Active Problems:    CAD (coronary artery disease) (1/5/2018)      Hypercholesterolemia (1/5/2018)      Atrial fibrillation with slow ventricular response ([facility] (7/18/2018)      SSS (sick sinus syndrome) ([facility] [address] (7/18/2018)        Plan:  Doing better  Walking  And still on dopamine  Will wean off dopamine, if BP is stable HR is > may be acceptable, will discuss with neurology. Subjective:    cc:  Bradycardia  Afib  stroke      REVIEW OF SYSTEMS:     General: No fevers or chills. Cardiovascular: No chest pain or pressure. No palpitations. No ankle swelling  Pulmonary: No SOB, orthopnea, PND  Gastrointestinal: No nausea, vomiting or diarrhea      Objective:      Visit Vitals    /64    Pulse 70    Temp 97.6 °F (36.4 °C)    Resp 18    Ht 5' (1.524 m)    Wt 70 kg (154 lb 5.2 oz)    SpO2 96%    BMI 30.14 kg/m2     Body mass index is 30.14 kg/(m^2). Physical Exam:  General Appearance: Comfortable, not using accessory muscles of respiration. NECK: No JVD, no thyroidomeglay. LUNGS: Clear bilaterally. HEART: S1 variable +S2 audible,    ABD: Non-tender, BS Audible    EXT: No edema, and no cysnosis. VASCULAR EXAM: Pulses are intact. PSYCHIATRIC EXAM: Mood is appropriate. MUSCULOSKELETAL: Grossly no joint deformity. NEUROLOGICAL: No motor and sensory deficit, Cranial nerves II-XII intact.   Medication:  Current Facility-Administered Medications   Medication Dose Route Frequency    atorvastatin (LIPITOR) tablet 80 mg  80 mg Oral DAILY    0.9% sodium chloride infusion  75 mL/hr IntraVENous CONTINUOUS    aspirin chewable tablet 324 mg  324 mg Oral DAILY    DOPamine (INTROPIN) 400 mg in dextrose 5% 250 mL infusion  0-20 mcg/kg/min IntraVENous TITRATE    famotidine (PEPCID) tablet 20 mg  20 mg Oral DAILY    Warfarin - Pharmacy to dose    Other Rx Dosing/Monitoring    sodium chloride (NS) flush 5-10 mL  5-10 mL IntraVENous Q8H    sodium chloride (NS) flush 5-10 mL  5-10 mL IntraVENous PRN    heparin (porcine) injection 5,000 Units  5,000 Units SubCUTAneous Q8H               Lab/Data Reviewed:  Procedures/imaging: see electronic medical records for all procedures/Xrays   and details which were not copied into this note but were reviewed prior to creation of Plan       All lab results for the last 24 hours reviewed.      Recent Labs      07/19/18   0413  07/18/18   0850   WBC  9.7  6.9   HGB  14.4  13.9   HCT  44.0  42.1   PLT  251  245     Recent Labs      07/19/18   0413  07/18/18   0850   NA  137  136   K  3.7  4.0   CL  103  104   CO2  26  26   GLU  117*  129*   BUN  10  13   CREA  0.93  1.09   CA  9.9  9.6       Signed By: Inocente Rodriguez MD     July 20, 2018

## 2018-07-20 NOTE — PROGRESS NOTES
Hospitalist Progress Note Patient: Jenni Queen MRN: [id number]  CSN: [id number] YOB: 1930  Age: 80 y.o. Sex: female DOA: 7/18/2018 LOS:  LOS: 2 days Assessment/Plan Patient Active Problem List  
Diagnosis Code  Abdominal pain, other specified site R10.9  Atrial fibrillation (HCC) I48.91  
 Constipation K59.00  
 Encephalopathy, unspecified G93.40  Abdominal wall hematoma S30. Caretha Jeans  CAD (coronary artery disease) I25.10  Hypercholesterolemia E78.00  Hypertension I10  
 Hx of myocardial infarction I25.2  Elevated INR R79.1  Atrial fibrillation with slow ventricular response (HCC) I48.91  
 SSS (sick sinus syndrome) ([facility]) I49.5  Acute CVA (cerebrovascular accident) [facility]) I63.9  
  
 
 
 
81 yo female admitted for CVA, A-fib with slow vent response. CRITICAL CARE PLAN Resp - No acute respiratory issues ID - no evidence of infection. CVS - A-fib with slow vent response - cardizem stopped, on dopamine drip. Wean off dopamine drip. Keep BP>120/60 On coumadin, pharmacy dosing. Heme/onc - Follow H&H, plts. INR. Renal - Trend BUN, Cr, follow I/O,  Check and replace Mg, K, phos. Endocrine -  Follow [facility] Neuro/ Pain/ Sedation - Acute CVA - continue with aspirin. MRI with multifocal small areas of acute ischemic infarct right MCA territory. CTA head and neck with no hemodynamically significant stenosis. 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 No intervention needed at this time per neurology. Neurology following On aspirin 325 mg dialy. On coumadin pharmacy dosing GI - seen by SLP, on regular diet. Prophylaxis - DVT: on heparin, stop once INR therapeutic, GI: pepcid.  
 
35 minutes of critical care time spent in the direct evaluation and treatment of this high risk patient. The reason for providing this level of medical care for this critically ill patient was due a critical illness that impaired one or more vital organ systems such that there was a high probability of imminent or life threatening deterioration in the patients condition. This care involved high complexity decision making to assess, manipulate, and support vital system functions, to treat this degreee vital organ system failure and to prevent further life threatening deterioration of the patients condition. Disposition : 2-3 days Review of systems General: No fevers or chills. Cardiovascular: No chest pain or pressure. No palpitations. Pulmonary: No shortness of breath. Gastrointestinal: No nausea, vomiting. Physical Exam: 
General: Awake, cooperative, no acute distress   
HEENT: NC, Atraumatic. PERRLA, anicteric sclerae. Lungs: CTA Bilaterally. No Wheezing/Rhonchi/Rales. Heart:  Regular  rhythm,  No murmur, No Rubs, No Gallops Abdomen: Soft, Non distended, Non tender.  +Bowel sounds, Extremities: No c/c/e Psych:   Not anxious or agitated. Neurological Exam:  
Mental Status:  Alert , co-operative , memory intact, speech disarthric. Cranial Nerves:  Intact visual fields. PERRL, EOM's full, no nystagmus, no ptosis. Facial sensation is normal. Facial movement is symmetric. Palate is midline. Normal sternocleidomastoid strength. Tongue is midline. Hearing is intact bilaterally. Motor:  5/5 strength in upper and right lower proximal and distal muscles. Left side weak compare to right Normal bulk and tone. Reflexes:  Deep tendon reflexes 2+/4 and symmetrical.   
Sensory:  Normal and symmetric bilaterally. Gait:  Not tested. Cerebellar:  No cerebellar signs present. Vital signs/Intake and Output: 
Visit Vitals  /57  Pulse 64  Temp 97.5 °F (36.4 °C)  Resp 13  Ht 5' (1.524 m)  Wt 70 kg (154 lb 5.2 oz)  SpO2 98%  BMI 30.14 kg/m2 Current Shift:  07/20 0701 - 07/20 1900 In: 180 [P.O.:180] Out: - Last three shifts:  07/18 1901 - 07/20 0700 In: 995.7 [I.V.:995.7] Out: 1475 [EQVNF:7671] Labs: Results:  
   
Chemistry Recent Labs  
   07/19/18 
 0413  07/18/18 
 6298 GLU  117*  129* NA  137  136  
K  3.7  4.0  
CL  103  104 CO2  26  26 BUN  10  13 CREA  0.93  1.09  
CA  9.9  9.6 AGAP  8  6 BUCR  11*  12  
AP   --   80  
TP   --   6.6 ALB   --   3.3*  
GLOB   --   3.3 AGRAT   --   1.0  
  
CBC w/Diff Recent Labs  
   07/19/18 
 0413  07/18/18 
 0850 WBC  9.7  6.9  
RBC  4.84  4.65  
HGB  14.4  13.9 HCT  44.0  42.1 PLT  251  245 GRANS   --   72  
LYMPH   --   17* EOS   --   2 Cardiac Enzymes Recent Labs  
   07/18/18 
 0850 CPK  29 CKND1  CALCULATION NOT PERFORMED WHEN RESULT IS BELOW LINEAR LIMIT Coagulation Recent Labs  
   07/20/18 
 0340  07/18/18 
 0850 PTP  13.6  13.8 INR  1.1  1.1 APTT   --   29.5 Lipid Panel Lab Results Component Value Date/Time Cholesterol, total 200 (H) 07/19/2018 04:13 AM  
 HDL Cholesterol 62 (H) 07/19/2018 04:13 AM  
 LDL, calculated 119.4 (H) 07/19/2018 04:13 AM  
 VLDL, calculated 18.6 07/19/2018 04:13 AM  
 Triglyceride 93 07/19/2018 04:13 AM  
 CHOL/HDL Ratio 3.2 07/19/2018 04:13 AM  
  
BNP No results for input(s): BNPP in the last 72 hours. Liver Enzymes Recent Labs  
   07/18/18 
 0850 TP  6.6 ALB  3.3* AP  80 SGOT  16 Thyroid Studies Lab Results Component Value Date/Time TSH 1.50 07/19/2018 04:13 AM  
    
Procedures/imaging: see electronic medical records for all procedures/Xrays and details which were not copied into this note but were reviewed prior to creation of Plan

## 2018-07-21 LAB
ANION GAP SERPL CALC-SCNC: 9 MMOL/L (ref 3–18)
BUN SERPL-MCNC: 8 MG/DL (ref 7–18)
BUN/CREAT SERPL: 9 (ref 12–20)
CALCIUM SERPL-MCNC: 9.1 MG/DL (ref 8.5–10.1)
CHLORIDE SERPL-SCNC: 105 MMOL/L (ref 100–108)
CO2 SERPL-SCNC: 27 MMOL/L (ref 21–32)
CORTIS SERPL-MCNC: 16 UG/DL (ref 3.09–22.4)
CREAT SERPL-MCNC: 0.87 MG/DL (ref 0.6–1.3)
ERYTHROCYTE [DISTWIDTH] IN BLOOD BY AUTOMATED COUNT: 12.8 % (ref 11.6–14.5)
GLUCOSE SERPL-MCNC: 101 MG/DL (ref 74–99)
HCT VFR BLD AUTO: 38.3 % (ref 35–45)
HGB BLD-MCNC: 12.4 G/DL (ref 12–16)
INR PPP: 1.1 (ref 0.8–1.2)
MCH RBC QN AUTO: 29.4 PG (ref 24–34)
MCHC RBC AUTO-ENTMCNC: 32.4 G/DL (ref 31–37)
MCV RBC AUTO: 90.8 FL (ref 74–97)
PLATELET # BLD AUTO: 227 K/UL (ref 135–420)
PMV BLD AUTO: 10.1 FL (ref 9.2–11.8)
POTASSIUM SERPL-SCNC: 3.2 MMOL/L (ref 3.5–5.5)
PROTHROMBIN TIME: 13.8 SEC (ref 11.5–15.2)
RBC # BLD AUTO: 4.22 M/UL (ref 4.2–5.3)
SODIUM SERPL-SCNC: 141 MMOL/L (ref 136–145)
WBC # BLD AUTO: 5.5 K/UL (ref 4.6–13.2)

## 2018-07-21 PROCEDURE — 97116 GAIT TRAINING THERAPY: CPT

## 2018-07-21 PROCEDURE — 74011250636 HC RX REV CODE- 250/636: Performed by: INTERNAL MEDICINE

## 2018-07-21 PROCEDURE — 85027 COMPLETE CBC AUTOMATED: CPT | Performed by: PSYCHIATRY & NEUROLOGY

## 2018-07-21 PROCEDURE — 82533 TOTAL CORTISOL: CPT | Performed by: INTERNAL MEDICINE

## 2018-07-21 PROCEDURE — 36592 COLLECT BLOOD FROM PICC: CPT

## 2018-07-21 PROCEDURE — 80048 BASIC METABOLIC PNL TOTAL CA: CPT | Performed by: PSYCHIATRY & NEUROLOGY

## 2018-07-21 PROCEDURE — 74011250637 HC RX REV CODE- 250/637: Performed by: INTERNAL MEDICINE

## 2018-07-21 PROCEDURE — 97530 THERAPEUTIC ACTIVITIES: CPT

## 2018-07-21 PROCEDURE — 85610 PROTHROMBIN TIME: CPT | Performed by: PSYCHIATRY & NEUROLOGY

## 2018-07-21 PROCEDURE — 65610000006 HC RM INTENSIVE CARE

## 2018-07-21 PROCEDURE — 74011250637 HC RX REV CODE- 250/637: Performed by: PSYCHIATRY & NEUROLOGY

## 2018-07-21 PROCEDURE — 97110 THERAPEUTIC EXERCISES: CPT

## 2018-07-21 RX ORDER — DOPAMINE HYDROCHLORIDE 160 MG/100ML
0-20 INJECTION, SOLUTION INTRAVENOUS
Status: DISCONTINUED | OUTPATIENT
Start: 2018-07-21 | End: 2018-07-23

## 2018-07-21 RX ORDER — ONDANSETRON 2 MG/ML
4 INJECTION INTRAMUSCULAR; INTRAVENOUS
Status: DISCONTINUED | OUTPATIENT
Start: 2018-07-21 | End: 2018-07-28 | Stop reason: HOSPADM

## 2018-07-21 RX ORDER — POTASSIUM CHLORIDE 20MEQ/15ML
40 LIQUID (ML) ORAL EVERY 4 HOURS
Status: COMPLETED | OUTPATIENT
Start: 2018-07-21 | End: 2018-07-21

## 2018-07-21 RX ADMIN — WARFARIN SODIUM 7.5 MG: 5 TABLET ORAL at 17:09

## 2018-07-21 RX ADMIN — POTASSIUM CHLORIDE 40 MEQ: 20 SOLUTION ORAL at 07:09

## 2018-07-21 RX ADMIN — FAMOTIDINE 20 MG: 20 TABLET ORAL at 09:02

## 2018-07-21 RX ADMIN — POTASSIUM CHLORIDE 40 MEQ: 20 SOLUTION ORAL at 11:32

## 2018-07-21 RX ADMIN — Medication 10 ML: at 23:02

## 2018-07-21 RX ADMIN — HEPARIN SODIUM 5000 UNITS: 5000 INJECTION, SOLUTION INTRAVENOUS; SUBCUTANEOUS at 23:02

## 2018-07-21 RX ADMIN — SODIUM CHLORIDE 75 ML/HR: 900 INJECTION, SOLUTION INTRAVENOUS at 23:02

## 2018-07-21 RX ADMIN — SODIUM CHLORIDE 75 ML/HR: 900 INJECTION, SOLUTION INTRAVENOUS at 09:06

## 2018-07-21 RX ADMIN — HEPARIN SODIUM 5000 UNITS: 5000 INJECTION, SOLUTION INTRAVENOUS; SUBCUTANEOUS at 05:40

## 2018-07-21 RX ADMIN — ATORVASTATIN CALCIUM 80 MG: 20 TABLET, FILM COATED ORAL at 09:02

## 2018-07-21 RX ADMIN — Medication 10 ML: at 05:40

## 2018-07-21 RX ADMIN — DOPAMINE HYDROCHLORIDE 5 MCG/KG/MIN: 160 INJECTION, SOLUTION INTRAVENOUS at 04:05

## 2018-07-21 RX ADMIN — ASPIRIN 81 MG 324 MG: 81 TABLET ORAL at 09:02

## 2018-07-21 RX ADMIN — HEPARIN SODIUM 5000 UNITS: 5000 INJECTION, SOLUTION INTRAVENOUS; SUBCUTANEOUS at 13:50

## 2018-07-21 NOTE — PROGRESS NOTES
Pharmacy Dosing Services:  Warfarin    Consult for Warfarin Dosing by Pharmacy by Dr. Parker Dumont provided for this 80 y.o.  female , for indication of CVA. Day of Therapy 3    Dose to achieve an INR goal of 2-3    Order entered for  Warfarin  7.5 (mg) ordered to be given today at 18:00. Significant drug interactions:  Heparin SQ,  mg    LABS    PT/INR Lab Results   Component Value Date/Time    INR 1.1 07/21/2018 04:15 AM      Platelets Lab Results   Component Value Date/Time    PLATELET 768 92/96/8772 04:15 AM      H/H     Lab Results   Component Value Date/Time    HGB 12.4 07/21/2018 04:15 AM        Warfarin Administrations (last 168 hours)     Date/Time Action Medication Dose    07/20/18 1743 Given    warfarin (COUMADIN) tablet 5 mg 5 mg    07/19/18 1849 Given    warfarin (COUMADIN) tablet 5 mg 5 mg          Pharmacy to follow daily and will provide subsequent Warfarin dosing based on clinical status.   Carolyn Moody, [facility]    Contact information   [phone]

## 2018-07-21 NOTE — PROGRESS NOTES
[facility]  Pulmonary, Critical Care, and Sleep Medicine    Pulm/CC Note    Name: Jackie Hdz MRN: [id number]   : 1930 Hospital: [facility] [place]   Date: 2018  Room: [facility]     Subjective: This patient has been seen and evaluated at the request of Dr. Basilia Dickerson for icu admission for stroke patient; slow Afib; need for pressor. Patient is a 80 y. o. female with hx of HTN, hyperlipidemia, CAD/MI and atrial fibrillation (on aspirin only, not taking coumadin), who presented with left sided upper and lower extremity weakness, slurred speech and left facial droop on 18. The patient was evaluated by tele neurology, onset was not clear and not given tPA. CT head nil acute. Patient was bradycardic with slow Afib and hypotensive - started on dopamine which is at 5 mcg/kg/min.    18  Patient awake, alert  Remains in slow Afib - dopamine remains at 5 mcg/kg/min  Resp stable - no SOB  Improved facial weakness and left sided weakness  No cough or chest pains.     Tele: Afib with rate 40-50s  UOP is good    Past Medical History:   Diagnosis Date    Arthritis     Atrial fibrillation ([address]     CAD (coronary artery disease)     Edema     Gastrointestinal disorder     High cholesterol     Hypertension     Hypoglycemia     Hypoglycemia     IBS (irritable bowel syndrome)     MI, old       Past Surgical History:   Procedure Laterality Date    HX CHOLECYSTECTOMY      HX KNEE REPLACEMENT      HX ORTHOPAEDIC      Left TKR      Allergies   Allergen Reactions    Amoxicillin Rash      Social History   Substance Use Topics    Smoking status: Never Smoker    Smokeless tobacco: Not on file    Alcohol use 0.5 oz/week     1 Glasses of wine per week      Comment: every few weeks        Current Facility-Administered Medications   Medication Dose Route Frequency    potassium chloride (KAON 10%) 20 mEq/15 mL oral liquid 40 mEq  40 mEq Oral Q4H    atorvastatin (LIPITOR) tablet 80 mg  80 mg Oral DAILY    0.9% sodium chloride infusion  75 mL/hr IntraVENous CONTINUOUS    aspirin chewable tablet 324 mg  324 mg Oral DAILY    DOPamine (INTROPIN) 800 mg in dextrose 5% 500 mL infusion  0-20 mcg/kg/min IntraVENous TITRATE    famotidine (PEPCID) tablet 20 mg  20 mg Oral DAILY    Warfarin - Pharmacy to dose    Other Rx Dosing/Monitoring    sodium chloride (NS) flush 5-10 mL  5-10 mL IntraVENous Q8H    heparin (porcine) injection 5,000 Units  5,000 Units SubCUTAneous Q8H       Latest lactic acid:   Lactic acid   Date Value Ref Range Status   2012 1.3 0.4 - 2.0 MMOL/L Final       Review of Systems:  No cough or CP or SOB or vomiting  Neg otherwise    Objective:   Vital Signs:    Visit Vitals    /68    Pulse 77    Temp 98 °F (36.7 °C)    Resp 11    Ht 5' (1.524 m)    Wt 70 kg (154 lb 5.2 oz)    SpO2 97%    BMI 30.14 kg/m2       O2 Device: Room air   O2 Flow Rate (L/min): 2 l/min   Temp (24hrs), Av.8 °F (36.6 °C), Min:97.6 °F (36.4 °C), Max:98 °F (36.7 °C)       Intake/Output:   Last shift:         Last 3 shifts:  1901 -  0700  In: 3899.5 [P.O.:420;  I.V.:3479.5]  Out: 2825 [Urine:2825]    Intake/Output Summary (Last 24 hours) at 18 0943  Last data filed at 18 6124   Gross per 24 hour   Intake          1957.72 ml   Output             1850 ml   Net           107.72 ml           Physical Exam:   Comfortable; on room air; acyanotic  Neck: No adenopathy or thyroid swelling  CVS: S1S2 no murmurs; JVD not elevated  RS: Good air entry bilaterally, no wheezes or crackles  Abd: soft, non tender, no hepatosplenomegaly, no abd distension  Neuro: awake, alert, no facial droop; no left sided neglect; left UE and LE strength 4+  Extrm: no leg edema or swelling or clubbing  Skin: no rash  Lymphatic: no cervical or supraclavicular adenopathy      Data review:     Recent Results (from the past 12 hour(s))   PROTHROMBIN TIME + INR    Collection Time: 07/21/18  4:15 AM   Result Value Ref Range    Prothrombin time 13.8 11.5 - 15.2 sec    INR 1.1 0.8 - 1.2     CBC W/O DIFF    Collection Time: 07/21/18  4:15 AM   Result Value Ref Range    WBC 5.5 4.6 - 13.2 K/uL    RBC 4.22 4.20 - 5.30 M/uL    HGB 12.4 12.0 - 16.0 g/dL    HCT 38.3 35.0 - 45.0 %    MCV 90.8 74.0 - 97.0 FL    MCH 29.4 24.0 - 34.0 PG    MCHC 32.4 31.0 - 37.0 g/dL    RDW 12.8 11.6 - 14.5 %    PLATELET 304 789 - 325 K/uL    MPV 10.1 9.2 - 90.9 FL   METABOLIC PANEL, BASIC    Collection Time: 07/21/18  4:15 AM   Result Value Ref Range    Sodium 141 136 - 145 mmol/L    Potassium 3.2 (L) 3.5 - 5.5 mmol/L    Chloride 105 100 - 108 mmol/L    CO2 27 21 - 32 mmol/L    Anion gap 9 3.0 - 18 mmol/L    Glucose 101 (H) 74 - 99 mg/dL    BUN 8 7.0 - 18 MG/DL    Creatinine 0.87 0.6 - 1.3 MG/DL    BUN/Creatinine ratio 9 (L) 12 - 20      GFR est AA >60 >60 ml/min/1.73m2    GFR est non-AA >60 >60 ml/min/1.73m2    Calcium 9.1 8.5 - 10.1 MG/DL             No results for input(s): FIO2I, IFO2, HCO3I, IHCO3, HCOPOC, PCO2I, PCOPOC, IPHI, PHI, PHPOC, PO2I, PO2POC in the last 72 hours. No lab exists for component: IPOC2    All Micro Results     None          ECHO   · Saline contrast was given to evaluate for intracardiac shunt. There is no evidence of intracrdiac large shunt  · Late bubbles suggest an extra cardiac shunt  · Left ventricular hyperdynamic systolic function. Estimated left ventricular ejection fraction is >70%. Please note that echo was performed when pt was on dopamine infusion 5 microgram/kg/min. · Left atrial cavity size is moderately dilated. · Right atrial cavity size is moderately dilated. · Aortic valve is trileaflet. Aortic valve sclerosis. · Mitral valve non-specific thickening. Mild to moderate mitral valve regurgitation. · Moderate tricuspid valve regurgitation is present. Pulmonary arterial systolic pressure is 42 mmHg. Moderate pulmonary hypertension is present.     Imaging:  [x]I have personally reviewed the patients chest radiographs images and report       Results from [place] encounter on 07/18/18   XR CHEST PORT   Narrative CLINICAL: Left-sided weakness. Cardiac arrhythmia. COMPARISON: January 5, 2018. A portable view of the chest from 0954 hours:    Lung fields appear clear. Minimal blunting left costophrenic angle. No focal  airspace disease. Thoracolumbar scoliosis. Mediastinal silhouette stable. Atherosclerotic calcifications in the aortic arch . Impression Impression:    Possible small left pleural effusion. Otherwise unremarkable        MRI Brain 7/18/18  IMPRESSION  1. Multifocal small areas of acute ischemic infarct right MCA territory  including right insula, deep white matter and posterior right frontal cortex. 2. Interval progression of moderate bilateral deep white matter signal changes  nonspecific, likely chronic microvascular ischemic disease. 3. Slight progression of mild diffuse volume loss.       IMPRESSION:   Principal Problem:    Acute CVA (cerebrovascular accident) ([address] (7/18/2018)    Active Problems:    CAD (coronary artery disease) (1/5/2018)      Hypercholesterolemia (1/5/2018)      Atrial fibrillation with slow ventricular response ([address] (7/18/2018)      SSS (sick sinus syndrome) ([address] (7/18/2018)    · HTN      RECOMMENDATIONS:   · Pulm: stable respirations; aspiration precautions  · Cardiac: keep SBP>120/60 mmhg; slow Afib - on dopamine drip; cardiology managing  · Endo: Tsh 1.5 normal; check cortisol  · Neuro: ASA, statin; Neuro on case; on coumadin therapy  · Fluids: NS 75/hr  · ID: no active issues  · Renal: watch IOs and renal fn; replace kcl  · GI: SLP team cleared for oral diet  · Hem: stable Hb and platelets  · Endo: poc glucose  · Proph:  DVT proph sc heparin - dc when INR therapeutic; GI proph pepcid  · D/w family - daughter, grand daughter and patient - updated; awaiting icu bed  Will defer respective systems problem management to primary and other consultant and follow patient in ICU with primary and other medical team  Further recommendations will be based on the patient's response to recommended treatment and results of the investigation ordered. Quality Care: PPI, DVT prophylaxis, HOB elevated, Infection control all reviewed and addressed.   PAIN AND SEDATION: none   · Skin/Wound: no active issues  · Nutrition: oral mechanical soft  · Prophylaxis: DVT and GI Prophylaxis reviewed  · Restraints: none  · PT/OT eval and treat: as needed  · Lines/Tubes: PIVs  ADVANCE DIRECTIVE: Full Code  Ok to tele once she is off dopamine     Mod complexity decision making was performed in this consultation and evaluation of this patient who is at high risk for decompensation with multiple organ involvement         Xochitl Sanders MD

## 2018-07-21 NOTE — PROGRESS NOTES
0959--Dopamine gtt increased to 7.5mcg for HR < 50. BP stable. 1125--Spoke c Dr. Lily Costa (Cardiology) and wants to attempt to wean patient's Dopamine gtt. Received orders to decrease infusion to 15mL/hr, then to 10mL/hr, then to 5mL/hr, then off. Goal of therapy HR > 50bpm and BP > 110/60.     1131--Dopamine gtt decreased to 15ml/hr. 1434--Patient experiencing increasing bradycardia. Dopamine wean stopped and gtt increased to 5mcg/kg/hr for HR 48.     1456--Continued bradycardia, Dopamine increased to 7.5mcg for HR 48.     1535--HR is now 65bpm. Will leave Dopamine gtt at current rate. 1930--Bedside and Verbal shift change report given to Timothy Coe RN (oncoming nurse) by Ruth Copeland RN (offgoing nurse). Report included the following information SBAR, Kardex, Intake/Output, MAR, Recent Results, Med Rec Status and Cardiac Rhythm AFib .

## 2018-07-21 NOTE — PROGRESS NOTES
@ 1930 pt taken over awake alert oriented x4, denies pain, vital signs stable on dopamine drip @5mcg/kg/min . Remains on room air spo2 95% . Assessment done and charted in relevant flow sheets . SCD's on bilateral feet Nursing management continues. @2120 no new developments pt up watching TV continues to deny pain observation continues. @2300 pt awake alert oriented , up to bedside commode, steady on feet, assisted to and from bedside commode. SCD's replaced on feet well tolerated, pt continues to deny pain ,nursing care continues. @0130 pt asleep care continues. @0330 no new developments. @0500 pt care continues no changes. @9984 Bedside and Verbal shift change report given to Jean Pierre Olivas (oncoming nurse) by Adalberto Uriostegui (offgoing nurse). Report included the following information SBAR, Kardex, Intake/Output, MAR, Recent Results and Med Rec Status.  Alarm parameters reviewed, on and audible Appropriate for patient clinical condition

## 2018-07-21 NOTE — PROGRESS NOTES
NEUROLOGY PROGRESS NOTE        Patient: Tata Ulloa        Sex: female          DOA: 7/18/2018  YOB: 1930      Age:  80 y.o.         LOS: 3 days     Identification:  [id number] right-handed female with history of hypertension, hyperlipidemia, CAD/MI and atrial fibrillation (on aspirin only), who presented with left sided weakness.       SUBJECTIVE:   The patient is stable with no new weakness or numbness. She is on dopamine, BP reasonable.      Stroke Work-up:  Brain MRI: 1. Multifocal small areas of acute ischemic infarct right MCA territory including right insula, deep white matter and posterior right frontal cortex. 2. Interval progression of moderate bilateral deep white matter signal changes nonspecific, likely chronic microvascular ischemic disease. 3. Slight progression of mild diffuse volume loss. CTA of head and neck: 1. No hemodynamically significant cervical vascular stenosis. 2. Thromboembolic occlusion inferior segment origin right M2 with reconstitution distally, and 7 mm long thromboembolic segment involving proximal superior segment right M2 with associated severe stenosis. 3. Remainder CTA head unremarkable. Echocardiogram: There is no evidence of intracrdiac large shunt. Late bubbles suggest an extra cardiac shunt. Left ventricular hyperdynamic systolic function. Estimated left ventricular ejection fraction is >70%. Please note that echo was performed when pt was on dopamine infusion 5 microgram/kg/min. Left atrial cavity size is moderately dilated. Right atrial cavity size is moderately dilated. Aortic valve is trileaflet. Aortic valve sclerosis. Mitral valve non-specific thickening. Mild to moderate mitral valve regurgitation. Moderate tricuspid valve regurgitation is present. Pulmonary arterial systolic pressure is 42 mmHg. Moderate pulmonary hypertension is present.   Lipid panel:         Lab Results   Component Value Date/Time      Cholesterol, total 200 (H) 07/19/2018 04:13 AM      HDL Cholesterol 62 (H) 07/19/2018 04:13 AM      LDL, calculated 119.4 (H) 07/19/2018 04:13 AM      VLDL, calculated 18.6 07/19/2018 04:13 AM      Triglyceride 93 07/19/2018 04:13 AM      CHOL/HDL Ratio 3.2 07/19/2018 04:13 AM       HbA1c:             Lab Results   Component Value Date/Time      Hemoglobin A1c 5.0 07/19/2018 04:13 AM       REVIEW OF SYSTEMS: Denies chest pain, abdominal pain, nausea or vomiting. No fever or chills. OBJECTIVE:      Visit Vitals    /68    Pulse 77    Temp 98 °F (36.7 °C)    Resp 11    Ht 5' (1.524 m)    Wt 70 kg (154 lb 5.2 oz)    SpO2 97%    BMI 30.14 kg/m2     Physical Exam:  GEN: Alert, NAD  EYES: conjunctiva normal, lids with out lesions  HEENT: MMM. HEART: RRR +S1 +S2  LUNGS: CTA B/L no rales or rhonchi. ABDOMEN: Soft, non-tender. EXTREMITIES: No edema cyanosis  SKIN: no rashes or skin breakdown, no nodules  NEURO: Alert, oriented x3. Speech is fluent. Cranials: Face: mild left weakness with smile. EOMI, VFF. Tongue is midline. Motor: Subtle left arm drift, otherwise full 4/5 strength at all sites. No pronator drift. Sensory: Intact to crude touch on all four. Coordination: Intact with no dysmetria during FNF. No neglect.     Current Facility-Administered Medications   Medication Dose Route Frequency    potassium chloride (KAON 10%) 20 mEq/15 mL oral liquid 40 mEq  40 mEq Oral Q4H    atorvastatin (LIPITOR) tablet 80 mg  80 mg Oral DAILY    0.9% sodium chloride infusion  75 mL/hr IntraVENous CONTINUOUS    aspirin chewable tablet 324 mg  324 mg Oral DAILY    DOPamine (INTROPIN) 800 mg in dextrose 5% 500 mL infusion  0-20 mcg/kg/min IntraVENous TITRATE    famotidine (PEPCID) tablet 20 mg  20 mg Oral DAILY    Warfarin - Pharmacy to dose    Other Rx Dosing/Monitoring    sodium chloride (NS) flush 5-10 mL  5-10 mL IntraVENous Q8H    sodium chloride (NS) flush 5-10 mL  5-10 mL IntraVENous PRN    heparin (porcine) injection 5,000 Units  5,000 Units SubCUTAneous Q8H       Laboratory  Recent Results (from the past 24 hour(s))   PROTHROMBIN TIME + INR    Collection Time: 07/21/18  4:15 AM   Result Value Ref Range    Prothrombin time 13.8 11.5 - 15.2 sec    INR 1.1 0.8 - 1.2     CBC W/O DIFF    Collection Time: 07/21/18  4:15 AM   Result Value Ref Range    WBC 5.5 4.6 - 13.2 K/uL    RBC 4.22 4.20 - 5.30 M/uL    HGB 12.4 12.0 - 16.0 g/dL    HCT 38.3 35.0 - 45.0 %    MCV 90.8 74.0 - 97.0 FL    MCH 29.4 24.0 - 34.0 PG    MCHC 32.4 31.0 - 37.0 g/dL    RDW 12.8 11.6 - 14.5 %    PLATELET 469 633 - 591 K/uL    MPV 10.1 9.2 - 78.1 FL   METABOLIC PANEL, BASIC    Collection Time: 07/21/18  4:15 AM   Result Value Ref Range    Sodium 141 136 - 145 mmol/L    Potassium 3.2 (L) 3.5 - 5.5 mmol/L    Chloride 105 100 - 108 mmol/L    CO2 27 21 - 32 mmol/L    Anion gap 9 3.0 - 18 mmol/L    Glucose 101 (H) 74 - 99 mg/dL    BUN 8 7.0 - 18 MG/DL    Creatinine 0.87 0.6 - 1.3 MG/DL    BUN/Creatinine ratio 9 (L) 12 - 20      GFR est AA >60 >60 ml/min/1.73m2    GFR est non-AA >60 >60 ml/min/1.73m2    Calcium 9.1 8.5 - 10.1 MG/DL       ASSESSMENT/IMPRESSION:   Right MCA infarcts, cardioembolic in nature. The patient needs to be on anticoagulation for secondary prevention. INR is still low.       RECOMMENDATIONS:  1. Aspirin 325mg po now until INR reaches >2.0. Continue atorvastatin to 80mg daily. 2. Continue Coumadin with a goal INR between 2.0 and 3.0. Talked to pharmacy about dose adjustments  3. Neuro checks per stroke protocol  4. Permissive hypertension (do not treat if BP is not >200/110, prefer prn labetolol 5mg). GOAL BP >110/60 if possible. Avoid hypotension. 5. IV normal saline continuous infusion 100-125 ml/h  6. Euglycemia with sliding scale insulin  7. Euthermia with acetaminophen 650mg Q6h prn for fever  8. PT/OT and speech assessment.     I will follow the patient. Please do not hesitate to return with any questions.     Signed:  eHnry Rene MD  7/21/2018  10:17 AM

## 2018-07-21 NOTE — ROUTINE PROCESS
Bedside and Verbal shift change report given to Brayan Lawrence RN (oncoming nurse) by Doron Dean RN (offgoing nurse). Report included the following information SBAR, Kardex, Intake/Output, MAR, Recent Results, Med Rec Status and Cardiac Rhythm AFib c bradycardia.

## 2018-07-21 NOTE — PROGRESS NOTES
[facility]  Inter[facility]  Pager [phone]    2018     Admit Date: 2018    Admit Diagnosis: Atrial fibrillation with slow ventricular response [facility])    NAME: Nini Serrano   :  1930     MRN: [id number]     Assessment/Plan:    1. CVA no residual.  2. Atrial fibrillation with a slow ventricular response. 3. Coronary artery disease. 4. Hypercholesterolemia. Plan:  1. Wean dopamine to off.  2. Continue Warfarin. Please do not hesitate to contact us with questions or concerns. See note below for details. Nuvia Diaz DO, [facility], [facility]    Cardiac Testing/ Procedures: A. Cardiac Cath/PCI:    B.ECHO/LESA:  · Saline contrast was given to evaluate for intracardiac shunt. There is no evidence of intracrdiac large shunt  · Late bubbles suggest an extra cardiac shunt  · Left ventricular hyperdynamic systolic function. Estimated left ventricular ejection fraction is >70%. Please note that echo was performed when pt was on dopamine infusion 5 microgram/kg/min. · Left atrial cavity size is moderately dilated. · Right atrial cavity size is moderately dilated. · Aortic valve is trileaflet. Aortic valve sclerosis. · Mitral valve non-specific thickening. Mild to moderate mitral valve regurgitation. · Moderate tricuspid valve regurgitation is present. Pulmonary arterial systolic pressure is 42 mmHg. Moderate pulmonary hypertension is present.        C. StressNuclear/Stress ECHO/Stress test:    D.Vascular:    E. EP:    F. Miscellaneous:    Subjective:      ROS:  (bold if positive, if negative)            Medications:  Current Facility-Administered Medications   Medication Dose Route Frequency    potassium chloride (KAON 10%) 20 mEq/15 mL oral liquid 40 mEq  40 mEq Oral Q4H    warfarin (COUMADIN) tablet 7.5 mg  7.5 mg Oral ONCE    atorvastatin (LIPITOR) tablet 80 mg  80 mg Oral DAILY    0.9% sodium chloride infusion  75 mL/hr IntraVENous CONTINUOUS    aspirin chewable tablet 324 mg  324 mg Oral DAILY    DOPamine (INTROPIN) 800 mg in dextrose 5% 500 mL infusion  0-20 mcg/kg/min IntraVENous TITRATE    famotidine (PEPCID) tablet 20 mg  20 mg Oral DAILY    Warfarin - Pharmacy to dose    Other Rx Dosing/Monitoring    sodium chloride (NS) flush 5-10 mL  5-10 mL IntraVENous Q8H    sodium chloride (NS) flush 5-10 mL  5-10 mL IntraVENous PRN    heparin (porcine) injection 5,000 Units  5,000 Units SubCUTAneous Q8H       Physical Exam:  Visit Vitals    /68    Pulse 77    Temp 98 °F (36.7 °C)    Resp 11    Ht 5' (1.524 m)    Wt 70 kg (154 lb 5.2 oz)    SpO2 97%    BMI 30.14 kg/m2    O2 Flow Rate (L/min): 2 l/min O2 Device: Room air      Telemetry: AFIB    Gen: Well-developed, well-nourished, in no acute distress  Neck: Supple,No JVD, No Carotid Bruit,   Resp: No accessory muscle use, Clear breath sounds, No rales or rhonchi  Card: Irregular rate and rhythm, Normal S1, S2, No murmurs, rubs or gallop. No thrills.    Abd:  Soft, non-tender, non-distended,BS+,   MSK: No cyanosis  Skin: No rashes    Neuro: moving all four extremities , follows commands appropriately  Psych:  Good insight, oriented to person, place , alert, Nml Affect  LE: No edema    EKG:  Atrial fibrillation with slow ventricular response with premature ventricular    or aberrantly conducted complexes   Left axis deviation   Anteroseptal infarct (cited on or before 08-FEB-2013)   ST & T wave abnormality, consider lateral ischemia or digitalis effect   Abnormal ECG       Cxray:    LABS:        Lab Results   Component Value Date/Time    WBC 5.5 07/21/2018 04:15 AM    HGB 12.4 07/21/2018 04:15 AM    HCT 38.3 07/21/2018 04:15 AM    PLATELET 352 00/43/6631 04:15 AM    MCV 90.8 07/21/2018 04:15 AM     Lab Results   Component Value Date/Time    Sodium 141 07/21/2018 04:15 AM    Potassium 3.2 (L) 07/21/2018 04:15 AM    Chloride 105 07/21/2018 04:15 AM    CO2 27 07/21/2018 04:15 AM    Anion gap 9 07/21/2018 04:15 AM    Glucose 101 (H) 07/21/2018 04:15 AM    BUN 8 07/21/2018 04:15 AM    Creatinine 0.87 07/21/2018 04:15 AM    BUN/Creatinine ratio 9 (L) 07/21/2018 04:15 AM    GFR est AA >60 07/21/2018 04:15 AM    GFR est non-AA >60 07/21/2018 04:15 AM    Calcium 9.1 07/21/2018 04:15 AM     Lab Results   Component Value Date/Time    CK 29 07/18/2018 08:50 AM    CK-MB Index  07/18/2018 08:50 AM     CALCULATION NOT PERFORMED WHEN RESULT IS BELOW LINEAR LIMIT    Troponin-I, Qt. <0.02 07/18/2018 08:50 AM     Lab Results   Component Value Date/Time    aPTT 29.5 07/18/2018 08:50 AM     Lab Results   Component Value Date/Time    INR 1.1 07/21/2018 04:15 AM    INR 1.1 07/20/2018 03:40 AM    INR 1.1 07/18/2018 08:50 AM    Prothrombin time 13.8 07/21/2018 04:15 AM    Prothrombin time 13.6 07/20/2018 03:40 AM    Prothrombin time 13.8 07/18/2018 08:50 AM     No results found for: BNP, BNPP, XBNPT    Care Plan discussed with: Patient and daughter  Nolvia Colon DO, [facility], [facility]

## 2018-07-21 NOTE — PROGRESS NOTES
Hospitalist Progress Note Patient: Arik Andre MRN: [id number]  CSN: [id number] YOB: 1930  Age: 80 y.o. Sex: female DOA: 7/18/2018 LOS:  LOS: 3 days Chief Complaint: Acute CVA and A.fib Assessment/Plan Disposition : 
Patient Active Problem List  
Diagnosis Code  Abdominal pain, other specified site R10.9  Atrial fibrillation (HCC) I48.91  
 Constipation K59.00  
 Encephalopathy, unspecified G93.40  Abdominal wall hematoma S30. [place]   CAD (coronary artery disease) I25.10  Hypercholesterolemia E78.00  Hypertension I10  
 Hx of myocardial infarction I25.2  Elevated INR R79.1  Atrial fibrillation with slow ventricular response (HCC) I48.91  
 SSS (sick sinus syndrome) ([facility]) I49.5  Acute CVA (cerebrovascular accident) [facility]) I63.9  
 
81 yo female admitted for CVA, A-fib with slow vent response. 
  
CRITICAL CARE PLAN 
  
Resp - No acute respiratory issues 
  
ID - no evidence of infection. 
  
CVS - A-fib with slow vent response - cardizem stopped, on dopamine drip. Wean off dopamine drip. Keep BP>120/60 On coumadin, pharmacy dosing. 
  
Heme/onc - Follow H&H, plts. INR. 
  
Renal - Trend BUN, Cr, follow I/O,  Check and replace Mg, K, phos prn. Consider trying K repletion by tablet; got nauseous with liquid and wonders if the tablet would be better. 
  
Endocrine -  Follow FSG 
  
Neuro/ Pain/ Sedation - no issues Acute CVA - continue with aspirin. MRI with multifocal small areas of acute ischemic infarct right MCA territory. CTA head and neck with no hemodynamically significant stenosis. 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 No intervention needed at this time per neurology. Neurology following On aspirin 325 mg dialy.  On coumadin pharmacy dosing 
  
GI - seen by SLP, on regular diet. Zofran for nausea prn. 
  
Prophylaxis - DVT: on heparin, stop once INR therapeutic, GI: pepcid. Dispo: to tele once dopamine is weaned to off; hopefully over night. 2-3 days Subjective: \"At first I couldn't talk at all. \" No events over night. Patient doing well this AM.  No new complaints. Review of systems: 
 
Constitutional: denies fevers, chills, myalgias Respiratory: denies SOB, cough Cardiovascular: denies chest pain, palpitations Gastrointestinal: denies nausea, vomiting, diarrhea Vital signs/Intake and Output: 
Visit Vitals  /68  Pulse 77  Temp 98 °F (36.7 °C)  Resp 11  
 Ht 5' (1.524 m)  Wt 70 kg (154 lb 5.2 oz)  SpO2 97%  BMI 30.14 kg/m2 Current Shift:  07/21 0701 - 07/21 1900 In: 2008.9 [I.V.:2008.9] Out: - Last three shifts:  07/19 1901 - 07/21 0700 In: 1941.8 [P.O.:420; I.V.:1521.8] Out: 2688 [Urine:2825] Exam: 
 
General: Well developed, alert, NAD, OX3 Head/Neck: NCAT, supple, No masses, No lymphadenopathy CVS:Regular rate and rhythm, no M/R/G, S1/S2 heard, no thrill Lungs:Clear to auscultation bilaterally, no wheezes, rhonchi, or rales Abdomen: Soft, Nontender, No distention, Normal Bowel sounds, No hepatomegaly Extremities: No C/C/E, pulses palpable 2+ Skin:normal texture and turgor, no rashes, no lesions. Neuro: new L.facial droop, slowly reaction right pupil compared to left, follows commands. Preserved strength and sensation. Psych:appropriate Labs: Results:  
   
Chemistry Recent Labs  
   07/21/18 
 0415  07/19/18 
 0413  07/18/18 
 4900 GLU  101*  117*  129* NA  141  137  136  
K  3.2*  3.7  4.0  
CL  105  103  104 CO2  27  26  26 BUN  8  10  13 CREA  0.87  0.93  1.09  
CA  9.1  9.9  9.6 AGAP  9  8  6 BUCR  9*  11*  12  
AP   --    --   80  
TP   --    --   6.6 ALB   --    --   3.3*  
GLOB   --    --   3.3 AGRAT   --    --   1.0  
  
CBC w/Diff Recent Labs  
   07/21/18 
 0415  07/19/18 
 0413  07/18/18 
 3714 WBC  5.5  9.7  6.9  
RBC  4.22  4.84  4.65  
HGB  12.4  14.4  13.9 HCT  38.3  44.0  42.1 PLT  227  251  245 GRANS   --    --   72  
LYMPH   --    --   17* EOS   --    --   2 Cardiac Enzymes Recent Labs  
   07/18/18 
 0850 CPK  29 CKND1  CALCULATION NOT PERFORMED WHEN RESULT IS BELOW LINEAR LIMIT Coagulation Recent Labs  
   07/21/18 
 0415  07/20/18 
 0340  07/18/18 
 0850 PTP  13.8  13.6  13.8 INR  1.1  1.1  1.1 APTT   --    --   29.5 Lipid Panel Lab Results Component Value Date/Time Cholesterol, total 200 (H) 07/19/2018 04:13 AM  
 HDL Cholesterol 62 (H) 07/19/2018 04:13 AM  
 LDL, calculated 119.4 (H) 07/19/2018 04:13 AM  
 VLDL, calculated 18.6 07/19/2018 04:13 AM  
 Triglyceride 93 07/19/2018 04:13 AM  
 CHOL/HDL Ratio 3.2 07/19/2018 04:13 AM  
  
BNP No results for input(s): BNPP in the last 72 hours. Liver Enzymes Recent Labs  
   07/18/18 
 0850 TP  6.6 ALB  3.3* AP  80 SGOT  16 Thyroid Studies Lab Results Component Value Date/Time TSH 1.50 07/19/2018 04:13 AM  
    
Procedures/imaging: see electronic medical records for all procedures/Xrays and details which were not copied into this note but were reviewed prior to creation of Plan Raghu Germain MD

## 2018-07-21 NOTE — PROGRESS NOTES
ARU/IPR REFERRAL CONTACT NOTE  [id number] [facility]    Re: Benna Horse    IP Consult for IP Rehab Screen received. Will review case and advise as appropriate. Thank you for the consult.     Nelson Chavira

## 2018-07-22 LAB
ANION GAP SERPL CALC-SCNC: 10 MMOL/L (ref 3–18)
BASOPHILS # BLD: 0 K/UL (ref 0–0.1)
BASOPHILS NFR BLD: 0 % (ref 0–2)
BUN SERPL-MCNC: 5 MG/DL (ref 7–18)
BUN/CREAT SERPL: 6 (ref 12–20)
CALCIUM SERPL-MCNC: 9.5 MG/DL (ref 8.5–10.1)
CHLORIDE SERPL-SCNC: 106 MMOL/L (ref 100–108)
CO2 SERPL-SCNC: 25 MMOL/L (ref 21–32)
CREAT SERPL-MCNC: 0.83 MG/DL (ref 0.6–1.3)
DIFFERENTIAL METHOD BLD: NORMAL
EOSINOPHIL # BLD: 0.1 K/UL (ref 0–0.4)
EOSINOPHIL NFR BLD: 2 % (ref 0–5)
ERYTHROCYTE [DISTWIDTH] IN BLOOD BY AUTOMATED COUNT: 12.9 % (ref 11.6–14.5)
GLUCOSE SERPL-MCNC: 108 MG/DL (ref 74–99)
HCT VFR BLD AUTO: 39.5 % (ref 35–45)
HGB BLD-MCNC: 12.8 G/DL (ref 12–16)
INR PPP: 1.7 (ref 0.8–1.2)
LYMPHOCYTES # BLD: 1.6 K/UL (ref 0.9–3.6)
LYMPHOCYTES NFR BLD: 28 % (ref 21–52)
MAGNESIUM SERPL-MCNC: 1.7 MG/DL (ref 1.6–2.6)
MCH RBC QN AUTO: 29.5 PG (ref 24–34)
MCHC RBC AUTO-ENTMCNC: 32.4 G/DL (ref 31–37)
MCV RBC AUTO: 91 FL (ref 74–97)
MONOCYTES # BLD: 0.5 K/UL (ref 0.05–1.2)
MONOCYTES NFR BLD: 9 % (ref 3–10)
NEUTS SEG # BLD: 3.3 K/UL (ref 1.8–8)
NEUTS SEG NFR BLD: 61 % (ref 40–73)
PLATELET # BLD AUTO: 217 K/UL (ref 135–420)
PMV BLD AUTO: 10 FL (ref 9.2–11.8)
POTASSIUM SERPL-SCNC: 3.6 MMOL/L (ref 3.5–5.5)
POTASSIUM SERPL-SCNC: 3.8 MMOL/L (ref 3.5–5.5)
PROTHROMBIN TIME: 19.1 SEC (ref 11.5–15.2)
RBC # BLD AUTO: 4.34 M/UL (ref 4.2–5.3)
SODIUM SERPL-SCNC: 141 MMOL/L (ref 136–145)
WBC # BLD AUTO: 5.5 K/UL (ref 4.6–13.2)

## 2018-07-22 PROCEDURE — 74011250636 HC RX REV CODE- 250/636: Performed by: INTERNAL MEDICINE

## 2018-07-22 PROCEDURE — 74011250637 HC RX REV CODE- 250/637: Performed by: PSYCHIATRY & NEUROLOGY

## 2018-07-22 PROCEDURE — 85610 PROTHROMBIN TIME: CPT | Performed by: PSYCHIATRY & NEUROLOGY

## 2018-07-22 PROCEDURE — 97116 GAIT TRAINING THERAPY: CPT

## 2018-07-22 PROCEDURE — 36415 COLL VENOUS BLD VENIPUNCTURE: CPT | Performed by: PSYCHIATRY & NEUROLOGY

## 2018-07-22 PROCEDURE — 65610000006 HC RM INTENSIVE CARE

## 2018-07-22 PROCEDURE — 83735 ASSAY OF MAGNESIUM: CPT | Performed by: INTERNAL MEDICINE

## 2018-07-22 PROCEDURE — 74011250637 HC RX REV CODE- 250/637: Performed by: INTERNAL MEDICINE

## 2018-07-22 PROCEDURE — 85025 COMPLETE CBC W/AUTO DIFF WBC: CPT | Performed by: INTERNAL MEDICINE

## 2018-07-22 PROCEDURE — 84132 ASSAY OF SERUM POTASSIUM: CPT | Performed by: INTERNAL MEDICINE

## 2018-07-22 RX ORDER — POTASSIUM CHLORIDE 20MEQ/15ML
20 LIQUID (ML) ORAL
Status: COMPLETED | OUTPATIENT
Start: 2018-07-23 | End: 2018-07-23

## 2018-07-22 RX ORDER — WARFARIN 3 MG/1
3 TABLET ORAL ONCE
Status: COMPLETED | OUTPATIENT
Start: 2018-07-22 | End: 2018-07-22

## 2018-07-22 RX ORDER — MAGNESIUM SULFATE 1 G/100ML
1 INJECTION INTRAVENOUS ONCE
Status: COMPLETED | OUTPATIENT
Start: 2018-07-23 | End: 2018-07-23

## 2018-07-22 RX ORDER — POTASSIUM CHLORIDE 20 MEQ/1
40 TABLET, EXTENDED RELEASE ORAL EVERY 6 HOURS
Status: COMPLETED | OUTPATIENT
Start: 2018-07-22 | End: 2018-07-22

## 2018-07-22 RX ADMIN — WARFARIN SODIUM 3 MG: 3 TABLET ORAL at 18:59

## 2018-07-22 RX ADMIN — DOPAMINE HYDROCHLORIDE IN DEXTROSE 4 MCG/KG/MIN: 1.6 INJECTION, SOLUTION INTRAVENOUS at 18:59

## 2018-07-22 RX ADMIN — POTASSIUM CHLORIDE 40 MEQ: 20 TABLET, EXTENDED RELEASE ORAL at 14:09

## 2018-07-22 RX ADMIN — FAMOTIDINE 20 MG: 20 TABLET ORAL at 08:40

## 2018-07-22 RX ADMIN — DOPAMINE HYDROCHLORIDE IN DEXTROSE 3 MCG/KG/MIN: 1.6 INJECTION, SOLUTION INTRAVENOUS at 16:12

## 2018-07-22 RX ADMIN — POTASSIUM CHLORIDE 40 MEQ: 20 TABLET, EXTENDED RELEASE ORAL at 18:59

## 2018-07-22 RX ADMIN — Medication 10 ML: at 06:01

## 2018-07-22 RX ADMIN — ATORVASTATIN CALCIUM 80 MG: 20 TABLET, FILM COATED ORAL at 08:40

## 2018-07-22 RX ADMIN — HEPARIN SODIUM 5000 UNITS: 5000 INJECTION, SOLUTION INTRAVENOUS; SUBCUTANEOUS at 06:01

## 2018-07-22 RX ADMIN — HEPARIN SODIUM 5000 UNITS: 5000 INJECTION, SOLUTION INTRAVENOUS; SUBCUTANEOUS at 22:35

## 2018-07-22 RX ADMIN — ASPIRIN 81 MG 324 MG: 81 TABLET ORAL at 08:40

## 2018-07-22 RX ADMIN — Medication 10 ML: at 14:10

## 2018-07-22 RX ADMIN — HEPARIN SODIUM 5000 UNITS: 5000 INJECTION, SOLUTION INTRAVENOUS; SUBCUTANEOUS at 14:09

## 2018-07-22 RX ADMIN — Medication 10 ML: at 23:00

## 2018-07-22 NOTE — ROUTINE PROCESS
Bedside, Verbal and Written shift change report given to RUDOLPH Panchal (oncoming nurse) by Karina Bain RN (offgoing nurse). Report included the following information SBAR, Kardex, Intake/Output and Recent Results. Assumed care of pt at this time. Pt aox4, following commands, denies pain, 2LNC, remains tele monitored, Dopamine gtt, up with assist to bedside commode. 0900. Scheduled med's given, pt tolerated well.   1200. Reassessed, status unchanged. VSS  1600. Reassessed, status unchanged, scheduled med's given. Pt moved to bedside chair. Tolerating well.       1700. Multiple attemtps to titrate Dopamine down. Pt has not tolerated titration. Continued gtt at [address] 1915. Bedside, Verbal and Written shift change report given to NETO Medina (oncoming nurse) by Parveen Panchal (offgoing nurse). Report included the following information SBAR, Kardex, Intake/Output and Recent Results. Bedside, Verbal and Written shift change report given to NETO Medina (oncoming nurse) by Parveen Boykin RN (offgoing nurse). Report included the following information SBAR, Kardex, Intake/Output and Recent Results.

## 2018-07-22 NOTE — PROGRESS NOTES
Hospitalist Progress Note Patient: Abelardo Johnson MRN: [id number]  CSN: [id number] YOB: 1930  Age: 80 y.o. Sex: female DOA: 7/18/2018 LOS:  LOS: 4 days Chief Complaint: Acute CVA and A.fib Assessment/Plan Disposition : 
Patient Active Problem List  
Diagnosis Code  Abdominal pain, other specified site R10.9  Atrial fibrillation (HCC) I48.91  
 Constipation K59.00  
 Encephalopathy, unspecified G93.40  Abdominal wall hematoma S30. Phillip Schlatter  CAD (coronary artery disease) I25.10  Hypercholesterolemia E78.00  Hypertension I10  
 Hx of myocardial infarction I25.2  Elevated INR R79.1  Atrial fibrillation with slow ventricular response (HCC) I48.91  
 SSS (sick sinus syndrome) ([facility]) I49.5  Acute CVA (cerebrovascular accident) [facility]) I63.9  
 
79 yo female admitted for CVA, A-fib with slow vent response. 
  
CRITICAL CARE PLAN 
  
Resp - No acute respiratory issues 
  
ID - no evidence of infection. 
  
CVS - A-fib with slow vent response - weaning dopamine drip. Currently 4 mcg/kg/min. Keep BP>120/60; don't treat <200/110 per neurology On coumadin, pharmacy dosing with INR 1.7 today. D/c heparin once therapeutic 
  
Heme/onc - Follow H&H, plts. INR. 
  
Renal - Trend BUN, Cr, follow I/O,  Check and replace Mg, K, phos prn. Less nausea with K repletion by tablet 
  
Endocrine -  Follow FSG 
  
Neuro/ Pain/ Sedation - no issues Acute CVA - continue with aspirin. MRI with multifocal small areas of acute ischemic infarct right MCA territory. CTA head and neck with no hemodynamically significant stenosis. 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 No intervention needed at this time per neurology. Neurology following On aspirin 325 mg dialy.  On coumadin pharmacy dosing 
  
GI - seen by SLP, on regular diet. Zofran for nausea prn. 
  
Prophylaxis - DVT: on heparin, stop once INR therapeutic, GI: pepcid. Dispo: to tele once dopamine is weaned to off; hopefully over night. 2-3 days Subjective: No events over night. Patient doing well this AM.  No new complaints. Review of systems: 
 
Constitutional: denies fevers, chills, myalgias Respiratory: denies SOB, cough Cardiovascular: denies chest pain, palpitations Gastrointestinal: denies nausea, vomiting, diarrhea Vital signs/Intake and Output: 
Visit Vitals  /76  Pulse (!) 57  Temp 98.1 °F (36.7 °C)  Resp 12  Ht 5' (1.524 m)  Wt 70 kg (154 lb 5.2 oz)  SpO2 96%  BMI 30.14 kg/m2 Current Shift:    
Last three shifts:  07/20 1901 - 07/22 0700 In: 3256.6 [P.O.:163; I.V.:3093.6] Out: 3500 [Urine:3500] Exam: 
 
General: Well developed, alert, NAD, OX3 Head/Neck: NCAT, supple, No masses, No lymphadenopathy CVS:Regular rate and rhythm, no M/R/G, S1/S2 heard, no thrill Lungs:Clear to auscultation bilaterally, no wheezes, rhonchi, or rales Abdomen: Soft, Nontender, No distention, Normal Bowel sounds, No hepatomegaly Extremities: No C/C/E, pulses palpable 2+ Skin:normal texture and turgor, no rashes, no lesions. Neuro: new L.facial droop, slowly reaction right pupil compared to left, follows commands. Preserved strength and sensation. Psych:appropriate Labs: Results:  
   
Chemistry Recent Labs  
   07/22/18 
 7508  07/21/18 
 2202 GLU  108*  101* NA  141  141  
K  3.6  3.2*  
CL  106  105 CO2  25  27 BUN  5*  8  
CREA  0.83  0.87 CA  9.5  9.1 AGAP  10  9 BUCR  6*  9*  
  
CBC w/Diff Recent Labs  
   07/21/18 
 0415 WBC  5.5  
RBC  4.22  
HGB  12.4 HCT  38.3 PLT  227 Cardiac Enzymes No results for input(s): CPK, CKND1, ZACARIAS in the last 72 hours. No lab exists for component: Debra Estrella Coagulation Recent Labs 07/22/18 
 4309  07/21/18 
 8765 PTP  19.1*  13.8 INR  1.7*  1.1 Lipid Panel Lab Results Component Value Date/Time Cholesterol, total 200 (H) 07/19/2018 04:13 AM  
 HDL Cholesterol 62 (H) 07/19/2018 04:13 AM  
 LDL, calculated 119.4 (H) 07/19/2018 04:13 AM  
 VLDL, calculated 18.6 07/19/2018 04:13 AM  
 Triglyceride 93 07/19/2018 04:13 AM  
 CHOL/HDL Ratio 3.2 07/19/2018 04:13 AM  
  
BNP No results for input(s): BNPP in the last 72 hours. Liver Enzymes No results for input(s): TP, ALB, TBIL, AP, SGOT, GPT in the last 72 hours. No lab exists for component: DBIL Thyroid Studies Lab Results Component Value Date/Time TSH 1.50 07/19/2018 04:13 AM  
    
Procedures/imaging: see electronic medical records for all procedures/Xrays and details which were not copied into this note but were reviewed prior to creation of Plan Cindy Garcia MD

## 2018-07-22 NOTE — PROGRESS NOTES
Pharmacy Dosing Services: Warfarin     Consult for Warfarin Dosing by Pharmacy by Dr. Angela Godfrey provided for this 80 y.o.  female , for indication of CVA. Day of Therapy 4  Dose to achieve an INR goal of 2-3    Order entered for  Warfarin 3 mg to be given today at 18:00. Pt receiving Heparin 5000 units sc q8h / Aspirin 324 mg po daily     PT/INR Lab Results   Component Value Date/Time    INR 1.7 (H) 07/22/2018 04:53 AM      Platelets Lab Results   Component Value Date/Time    PLATELET 598 07/72/0549 07:22 AM      H/H     Lab Results   Component Value Date/Time    HGB 12.8 07/22/2018 07:22 AM        Warfarin Administrations (last 168 hours)       Date/Time Action Medication Dose      07/21/18 1709 Given    warfarin (COUMADIN) tablet 7.5 mg 7.5 mg    07/20/18 1743 Given    warfarin (COUMADIN) tablet 5 mg 5 mg    07/19/18 1849 Given    warfarin (COUMADIN) tablet 5 mg 5 mg          Pharmacy to follow daily and will provide subsequent Warfarin dosing based on clinical status.   VINAY Villegas [facility] [place])  Contact information  [phone]

## 2018-07-22 NOTE — PROGRESS NOTES
NEUROLOGY PROGRESS NOTE        Patient: Bryson Lynch        Sex: female          DOA: 7/18/2018  YOB: 1930      Age:  80 y.o.         LOS: 4 days     Identification:  [id number] right-handed female with history of hypertension, hyperlipidemia, CAD/MI and atrial fibrillation (on aspirin only), who presented with left sided weakness.       SUBJECTIVE:   The patient is stable with no new weakness or numbness.       Stroke Work-up:  Brain MRI: 1. Multifocal small areas of acute ischemic infarct right MCA territory including right insula, deep white matter and posterior right frontal cortex. 2. Interval progression of moderate bilateral deep white matter signal changes nonspecific, likely chronic microvascular ischemic disease. 3. Slight progression of mild diffuse volume loss. CTA of head and neck: 1. No hemodynamically significant cervical vascular stenosis. 2. Thromboembolic occlusion inferior segment origin right M2 with reconstitution distally, and 7 mm long thromboembolic segment involving proximal superior segment right M2 with associated severe stenosis. 3. Remainder CTA head unremarkable. Echocardiogram: There is no evidence of intracrdiac large shunt. Late bubbles suggest an extra cardiac shunt. Left ventricular hyperdynamic systolic function. Estimated left ventricular ejection fraction is >70%. Please note that echo was performed when pt was on dopamine infusion 5 microgram/kg/min. Left atrial cavity size is moderately dilated. Right atrial cavity size is moderately dilated. Aortic valve is trileaflet. Aortic valve sclerosis. Mitral valve non-specific thickening. Mild to moderate mitral valve regurgitation. Moderate tricuspid valve regurgitation is present. Pulmonary arterial systolic pressure is 42 mmHg. Moderate pulmonary hypertension is present.   Lipid panel:             Lab Results   Component Value Date/Time      Cholesterol, total 200 (H) 07/19/2018 04:13 AM      HDL Cholesterol 62 (H) 07/19/2018 04:13 AM      LDL, calculated 119.4 (H) 07/19/2018 04:13 AM      VLDL, calculated 18.6 07/19/2018 04:13 AM      Triglyceride 93 07/19/2018 04:13 AM      CHOL/HDL Ratio 3.2 07/19/2018 04:13 AM       HbA1c:                 Lab Results   Component Value Date/Time      Hemoglobin A1c 5.0 07/19/2018 04:13 AM         REVIEW OF SYSTEMS: Denies chest pain, abdominal pain, nausea or vomiting. No fever or chills. OBJECTIVE:      Visit Vitals    /67    Pulse (!) 59    Temp 97.7 °F (36.5 °C)    Resp 13    Ht 5' (1.524 m)    Wt 70 kg (154 lb 5.2 oz)    SpO2 95%    BMI 30.14 kg/m2     Physical Exam:  GEN: Alert, NAD  EYES: conjunctiva normal, lids with out lesions  HEENT: MMM. HEART: RRR +S1 +S2  LUNGS: CTA B/L no rales or rhonchi. ABDOMEN: Soft, non-tender. EXTREMITIES: No edema cyanosis  SKIN: no rashes or skin breakdown, no nodules  NEURO: Alert, oriented x3. Speech is fluent. Cranials: Face is symmetric. Smiles symmetrically today. EOMI, VFF. Tongue is midline. Motor: Full 4/5 strength at all sites. No pronator drift. Sensory: Intact to crude touch on all four. Coordination: Intact with no dysmetria during FNF.      Current Facility-Administered Medications   Medication Dose Route Frequency    potassium chloride (K-DUR, KLOR-CON) SR tablet 40 mEq  40 mEq Oral Q6H    DOPamine (INTROPIN) 800 mg in dextrose 5% 500 mL infusion  0-20 mcg/kg/min IntraVENous TITRATE    ondansetron (ZOFRAN) injection 4 mg  4 mg IntraVENous Q6H PRN    ELECTROLYTE REPLACEMENT PROTOCOL-POTASSIUM  1 Each Other PRN    ELECTROLYTE REPLACEMENT PROTOCOL-MAGNESIUM  1 Each Other PRN    atorvastatin (LIPITOR) tablet 80 mg  80 mg Oral DAILY    0.9% sodium chloride infusion  75 mL/hr IntraVENous CONTINUOUS    aspirin chewable tablet 324 mg  324 mg Oral DAILY    famotidine (PEPCID) tablet 20 mg  20 mg Oral DAILY    Warfarin - Pharmacy to dose    Other Rx Dosing/Monitoring    sodium chloride (NS) flush 5-10 mL  5-10 mL IntraVENous Q8H    sodium chloride (NS) flush 5-10 mL  5-10 mL IntraVENous PRN    heparin (porcine) injection 5,000 Units  5,000 Units SubCUTAneous Q8H       Laboratory  Recent Results (from the past 24 hour(s))   PROTHROMBIN TIME + INR    Collection Time: 07/22/18  4:53 AM   Result Value Ref Range    Prothrombin time 19.1 (H) 11.5 - 15.2 sec    INR 1.7 (H) 0.8 - 1.2     METABOLIC PANEL, BASIC    Collection Time: 07/22/18  7:22 AM   Result Value Ref Range    Sodium 141 136 - 145 mmol/L    Potassium 3.6 3.5 - 5.5 mmol/L    Chloride 106 100 - 108 mmol/L    CO2 25 21 - 32 mmol/L    Anion gap 10 3.0 - 18 mmol/L    Glucose 108 (H) 74 - 99 mg/dL    BUN 5 (L) 7.0 - 18 MG/DL    Creatinine 0.83 0.6 - 1.3 MG/DL    BUN/Creatinine ratio 6 (L) 12 - 20      GFR est AA >60 >60 ml/min/1.73m2    GFR est non-AA >60 >60 ml/min/1.73m2    Calcium 9.5 8.5 - 10.1 MG/DL   CBC WITH AUTOMATED DIFF    Collection Time: 07/22/18  7:22 AM   Result Value Ref Range    WBC 5.5 4.6 - 13.2 K/uL    RBC 4.34 4.20 - 5.30 M/uL    HGB 12.8 12.0 - 16.0 g/dL    HCT 39.5 35.0 - 45.0 %    MCV 91.0 74.0 - 97.0 FL    MCH 29.5 24.0 - 34.0 PG    MCHC 32.4 31.0 - 37.0 g/dL    RDW 12.9 11.6 - 14.5 %    PLATELET 089 488 - 392 K/uL    MPV 10.0 9.2 - 11.8 FL    NEUTROPHILS 61 40 - 73 %    LYMPHOCYTES 28 21 - 52 %    MONOCYTES 9 3 - 10 %    EOSINOPHILS 2 0 - 5 %    BASOPHILS 0 0 - 2 %    ABS. NEUTROPHILS 3.3 1.8 - 8.0 K/UL    ABS. LYMPHOCYTES 1.6 0.9 - 3.6 K/UL    ABS. MONOCYTES 0.5 0.05 - 1.2 K/UL    ABS. EOSINOPHILS 0.1 0.0 - 0.4 K/UL    ABS. BASOPHILS 0.0 0.0 - 0.1 K/UL    DF AUTOMATED       ASSESSMENT/IMPRESSION:   Right MCA infarcts, cardioembolic in nature. The patient needs to be on anticoagulation for secondary prevention. INR is increasing, last 1.7.       RECOMMENDATIONS:  1. Aspirin 325mg po now until INR reaches >2.0. Continue atorvastatin to 80mg daily. 2. Continue Coumadin with a goal INR between 2.0 and 3.0.  Talked to pharmacy about dose adjustments  3. Neuro checks per stroke protocol  4. Permissive hypertension (do not treat if BP is not >200/110, prefer prn labetolol 5mg). GOAL BP >110/60 if possible. Avoid hypotension. 5. IV normal saline continuous infusion 100-125 ml/h  6. Euglycemia with sliding scale insulin  7. Euthermia with acetaminophen 650mg Q6h prn for fever  8. PT/OT and speech assessment. OK to transfer to telemetry unit.      I will follow the patient.  Please do not hesitate to return with any questions.       Signed:  Evan Puri MD  7/22/2018  11:35 AM

## 2018-07-22 NOTE — PROGRESS NOTES
Problem: Mobility Impaired (Adult and Pediatric)  Goal: *Acute Goals and Plan of Care (Insert Text)  Physical Therapy Goals  Initiated 7/19/2018 and to be accomplished within 3-7 day(s)  1. Patient will move from supine to sit and sit to supine  in bed with supervision/set-up. 2.  Patient will transfer from bed to chair and chair to bed with supervision/set-up using the least restrictive device. 3.  Patient will perform sit to stand with supervision/set-up. 4.  Patient will ambulate with supervision/set-up for 150 feet with the least restrictive device. 5.  Patient will ascend/descend 6 stairs with B handrail(s) with minimal assistance/contact guard assist.   Outcome: Progressing Towards Goal  physical Therapy TREATMENT    Patient: Delma Mathew (51 y.o. female)  Date: 7/22/2018  Diagnosis: Atrial fibrillation with slow ventricular response (HCC) Acute CVA (cerebrovascular accident) [facility])  Precautions: Fall   Chart, physical therapy assessment, plan of care and goals were reviewed. ASSESSMENT:  Pt maintaining ambulation distance 350' with RW CGA/SBS for safety. Mild fatigue noted post ambulation. Slight confusion noted as pt thinks she already ate dinner. CG present and mentions she has not. Cont POC. Progression toward goals:  []      Improving appropriately and progressing toward goals  [x]      Improving slowly and progressing toward goals  []      Not making progress toward goals and plan of care will be adjusted     PLAN:  Patient continues to benefit from skilled intervention to address the above impairments. Continue treatment per established plan of care.   Discharge Recommendations:  Home Health  Further Equipment Recommendations for Discharge:  rolling walker     SUBJECTIVE:   Patient stated  I want to go home \"    OBJECTIVE DATA SUMMARY:   Critical Behavior:  Neurologic State: Alert, Eyes open spontaneously  Orientation Level: Oriented X4  Cognition: Follows commands  Safety/Judgement: Awareness of environment, Good awareness of safety precautions  Functional Mobility Training:  Bed Mobility:  Rolling: Supervision  Supine to Sit: Supervision  Sit to Supine: Supervision  Transfers:  Sit to Stand: Contact guard assistance  Stand to Sit: Contact guard assistance  Balance:  Sitting: Intact  Standing: Intact; With support  Standing - Static: Good  Standing - Dynamic : Fair  Ambulation/Gait Training:  Distance (ft): 350 Feet (ft)  Assistive Device: Walker, rolling;Gait belt  Ambulation - Level of Assistance: Contact guard assistance;Stand-by assistance  Gait Abnormalities: Decreased step clearance; Path deviations  Base of Support: Narrowed  Speed/Tangela: Slow  Step Length: Right shortened;Left shortened    Pain:  Pain Scale 1: Numeric (0 - 10)  Pain Intensity 1: 0  Activity Tolerance:   Fair+    After treatment:   [] Patient left in no apparent distress sitting up in chair  [x] Patient left in no apparent distress in bed  [x] Call bell left within reach  [] Nursing notified  [x] Caregiver present  [] Bed alarm activated      Bijan Cazares PTA   Time Calculation: 17 mins

## 2018-07-22 NOTE — PROGRESS NOTES
[facility] Camp[facility]  Interventional Cardiology          Pager [phone]  2018     Admit Date: 2018    Admit Diagnosis: Atrial fibrillation with slow ventricular response [facility])    NAME: Mae Momin   :  1930     MRN: [id number]     Assessment/Plan:    1. CVA no residual.  2. Atrial fibrillation with a slow ventricular response. 3. Coronary artery disease. 4. Hypercholesterolemia.     Plan:  1. Wean dopamine to off.  2. Continue Warfarin. Please do not hesitate to contact us with questions or concerns. See note below for details. Tori Barton, DO< [facility]    Cardiac Testing/ Procedures: A. Cardiac Cath/PCI:    B.ECHO/LESA:    C.StressNuclear/Stress ECHO/Stress test:    D.Vascular:    E. EP:    F. Miscellaneous:    Subjective:      ROS:  (bold if positive, if negative)            Medications:  Current Facility-Administered Medications   Medication Dose Route Frequency    DOPamine (INTROPIN) 800 mg in dextrose 5% 500 mL infusion  0-20 mcg/kg/min IntraVENous TITRATE    ondansetron (ZOFRAN) injection 4 mg  4 mg IntraVENous Q6H PRN    ELECTROLYTE REPLACEMENT PROTOCOL-POTASSIUM  1 Each Other PRN    ELECTROLYTE REPLACEMENT PROTOCOL-MAGNESIUM  1 Each Other PRN    atorvastatin (LIPITOR) tablet 80 mg  80 mg Oral DAILY    0.9% sodium chloride infusion  75 mL/hr IntraVENous CONTINUOUS    aspirin chewable tablet 324 mg  324 mg Oral DAILY    famotidine (PEPCID) tablet 20 mg  20 mg Oral DAILY    Warfarin - Pharmacy to dose    Other Rx Dosing/Monitoring    sodium chloride (NS) flush 5-10 mL  5-10 mL IntraVENous Q8H    sodium chloride (NS) flush 5-10 mL  5-10 mL IntraVENous PRN    heparin (porcine) injection 5,000 Units  5,000 Units SubCUTAneous Q8H       Physical Exam:  Visit Vitals    /67    Pulse (!) 59    Temp 98.1 °F (36.7 °C)    Resp 13    Ht 5' (1.524 m)    Wt 70 kg (154 lb 5.2 oz)    SpO2 95%    BMI 30.14 kg/m2    O2 Flow Rate (L/min): 2 l/min O2 Device: Room air      Telemetry: AFIB    Gen: Well-developed, well-nourished, in no acute distress  Neck: Supple,No JVD, No Carotid Bruit,   Resp: No accessory muscle use, Clear breath sounds, No rales or rhonchi  Card: Irregular rate and rhythm, Normal S1, S2, No murmurs, rubs or gallop. No thrills.    Abd:  Soft, non-tender, non-distended,BS+,   MSK: No cyanosis  Skin: No rashes    Neuro: moving all four extremities , follows commands appropriately  Psych:  Good insight, oriented to person, place , alert, Nml Affect  LE: No edema    EKG:        Cxray:    LABS:        Lab Results   Component Value Date/Time    WBC 5.5 07/21/2018 04:15 AM    HGB 12.4 07/21/2018 04:15 AM    HCT 38.3 07/21/2018 04:15 AM    PLATELET 590 83/71/7306 04:15 AM    MCV 90.8 07/21/2018 04:15 AM     Lab Results   Component Value Date/Time    Sodium 141 07/22/2018 07:22 AM    Potassium 3.6 07/22/2018 07:22 AM    Chloride 106 07/22/2018 07:22 AM    CO2 25 07/22/2018 07:22 AM    Anion gap 10 07/22/2018 07:22 AM    Glucose 108 (H) 07/22/2018 07:22 AM    BUN 5 (L) 07/22/2018 07:22 AM    Creatinine 0.83 07/22/2018 07:22 AM    BUN/Creatinine ratio 6 (L) 07/22/2018 07:22 AM    GFR est AA >60 07/22/2018 07:22 AM    GFR est non-AA >60 07/22/2018 07:22 AM    Calcium 9.5 07/22/2018 07:22 AM     Lab Results   Component Value Date/Time    CK 29 07/18/2018 08:50 AM    CK-MB Index  07/18/2018 08:50 AM     CALCULATION NOT PERFORMED WHEN RESULT IS BELOW LINEAR LIMIT    Troponin-I, Qt. <0.02 07/18/2018 08:50 AM     Lab Results   Component Value Date/Time    aPTT 29.5 07/18/2018 08:50 AM     Lab Results   Component Value Date/Time    INR 1.7 (H) 07/22/2018 04:53 AM    INR 1.1 07/21/2018 04:15 AM    INR 1.1 07/20/2018 03:40 AM    Prothrombin time 19.1 (H) 07/22/2018 04:53 AM    Prothrombin time 13.8 07/21/2018 04:15 AM    Prothrombin time 13.6 07/20/2018 03:40 AM     No results found for: BNP, BNPP, XBNPT    Care Plan discussed with:   Juju Arredondo Joseph Krabbe, DO, FACC, [place], [facility]

## 2018-07-22 NOTE — PROGRESS NOTES
[facility]  Pulmonary, Critical Care, and Sleep Medicine    Pulm/CC Note    Name: Riki Dos Santos MRN: [id number]   : 1930 Hospital: [facility]   Date: 2018  Room: [facility]/     Subjective: This patient has been seen and evaluated at the request of Dr. Julieta Matthews for icu admission for stroke patient; slow Afib; need for pressor. Patient is a 80 y. o. female with hx of HTN, hyperlipidemia, CAD/MI and atrial fibrillation (on aspirin only, not taking coumadin), who presented with left sided upper and lower extremity weakness, slurred speech and left facial droop on 18. The patient was evaluated by tele neurology, onset was not clear and not given tPA. CT head nil acute.   Patient was bradycardic with slow Afib and hypotensive - started on dopamine which is at 5 mcg/kg/min.    18  Patient awake, alert  Remains in slow Afib - dopamine remains at 4 mcg/kg/min; sometimes HR goes to 40s  BP remains stable  No cough or CP or SOB  No worsening left sided weakness  Afebrile    Tele: Afib with rate 40-50s  UOP 2.4 L yesterday    Past Medical History:   Diagnosis Date    Arthritis     Atrial fibrillation ([address]     CAD (coronary artery disease)     Edema     Gastrointestinal disorder     High cholesterol     Hypertension     Hypoglycemia     Hypoglycemia     IBS (irritable bowel syndrome)     MI, old       Past Surgical History:   Procedure Laterality Date    HX CHOLECYSTECTOMY      HX KNEE REPLACEMENT      HX ORTHOPAEDIC      Left TKR      Allergies   Allergen Reactions    Amoxicillin Rash      Social History   Substance Use Topics    Smoking status: Never Smoker    Smokeless tobacco: Not on file    Alcohol use 0.5 oz/week     1 Glasses of wine per week      Comment: every few weeks        Current Facility-Administered Medications   Medication Dose Route Frequency    DOPamine (INTROPIN) 800 mg in dextrose 5% 500 mL infusion  0-20 mcg/kg/min IntraVENous TITRATE    atorvastatin (LIPITOR) tablet 80 mg  80 mg Oral DAILY    0.9% sodium chloride infusion  75 mL/hr IntraVENous CONTINUOUS    aspirin chewable tablet 324 mg  324 mg Oral DAILY    famotidine (PEPCID) tablet 20 mg  20 mg Oral DAILY    Warfarin - Pharmacy to dose    Other Rx Dosing/Monitoring    sodium chloride (NS) flush 5-10 mL  5-10 mL IntraVENous Q8H    heparin (porcine) injection 5,000 Units  5,000 Units SubCUTAneous Q8H       Latest lactic acid:   Lactic acid   Date Value Ref Range Status   2012 1.3 0.4 - 2.0 MMOL/L Final       Review of Systems:  No cough or CP or SOB or vomiting  Neg otherwise    Objective:   Vital Signs:    Visit Vitals    /67    Pulse (!) 59    Temp 98.1 °F (36.7 °C)    Resp 13    Ht 5' (1.524 m)    Wt 70 kg (154 lb 5.2 oz)    SpO2 95%    BMI 30.14 kg/m2       O2 Device: Room air   O2 Flow Rate (L/min): 2 l/min   Temp (24hrs), Av °F (36.7 °C), Min:97.9 °F (36.6 °C), Max:98.2 °F (36.8 °C)       Intake/Output:   Last shift:       07 -  190  In: 246.8 [I.V.:246.8]  Out: -   Last 3 shifts: 1901 -  0700  In: 3266.5 [P.O.:163; I.V.:3103.5]  Out: 3500 [Urine:3500]    Intake/Output Summary (Last 24 hours) at 18 1042  Last data filed at 18 1000   Gross per 24 hour   Intake          2459.71 ml   Output             2400 ml   Net            59.71 ml           Physical Exam:   Comfortable; on room air; acyanotic  Neck: No adenopathy or thyroid swelling  CVS: S1S2 no murmurs; JVD not elevated  RS: Good air entry bilaterally, no wheezes or crackles  Abd: soft, non tender, no hepatosplenomegaly, no abd distension  Neuro: awake, alert, no facial droop; no left sided neglect; left UE and LE strength 4+  Extrm: no leg edema or swelling or clubbing  Skin: no rash  Lymphatic: no cervical or supraclavicular adenopathy      Data review:     Recent Results (from the past 12 hour(s))   PROTHROMBIN TIME + INR    Collection Time: 07/22/18  4:53 AM   Result Value Ref Range    Prothrombin time 19.1 (H) 11.5 - 15.2 sec    INR 1.7 (H) 0.8 - 1.2     METABOLIC PANEL, BASIC    Collection Time: 07/22/18  7:22 AM   Result Value Ref Range    Sodium 141 136 - 145 mmol/L    Potassium 3.6 3.5 - 5.5 mmol/L    Chloride 106 100 - 108 mmol/L    CO2 25 21 - 32 mmol/L    Anion gap 10 3.0 - 18 mmol/L    Glucose 108 (H) 74 - 99 mg/dL    BUN 5 (L) 7.0 - 18 MG/DL    Creatinine 0.83 0.6 - 1.3 MG/DL    BUN/Creatinine ratio 6 (L) 12 - 20      GFR est AA >60 >60 ml/min/1.73m2    GFR est non-AA >60 >60 ml/min/1.73m2    Calcium 9.5 8.5 - 10.1 MG/DL             No results for input(s): FIO2I, IFO2, HCO3I, IHCO3, HCOPOC, PCO2I, PCOPOC, IPHI, PHI, PHPOC, PO2I, PO2POC in the last 72 hours. No lab exists for component: IPOC2    All Micro Results     None          ECHO   · Saline contrast was given to evaluate for intracardiac shunt. There is no evidence of intracrdiac large shunt  · Late bubbles suggest an extra cardiac shunt  · Left ventricular hyperdynamic systolic function. Estimated left ventricular ejection fraction is >70%. Please note that echo was performed when pt was on dopamine infusion 5 microgram/kg/min. · Left atrial cavity size is moderately dilated. · Right atrial cavity size is moderately dilated. · Aortic valve is trileaflet. Aortic valve sclerosis. · Mitral valve non-specific thickening. Mild to moderate mitral valve regurgitation. · Moderate tricuspid valve regurgitation is present. Pulmonary arterial systolic pressure is 42 mmHg. Moderate pulmonary hypertension is present. Imaging:  [x]I have personally reviewed the patients chest radiographs images and report       Results from Hospital Encounter encounter on 07/18/18   XR CHEST PORT   Narrative CLINICAL: Left-sided weakness. Cardiac arrhythmia. COMPARISON: January 5, 2018. A portable view of the chest from 0954 hours:    Lung fields appear clear.  Minimal blunting left costophrenic angle. No focal  airspace disease. Thoracolumbar scoliosis. Mediastinal silhouette stable. Atherosclerotic calcifications in the aortic arch . Impression Impression:    Possible small left pleural effusion. Otherwise unremarkable        MRI Brain 7/18/18  IMPRESSION  1. Multifocal small areas of acute ischemic infarct right MCA territory  including right insula, deep white matter and posterior right frontal cortex. 2. Interval progression of moderate bilateral deep white matter signal changes  nonspecific, likely chronic microvascular ischemic disease. 3. Slight progression of mild diffuse volume loss. IMPRESSION:   Principal Problem:    Acute CVA (cerebrovascular accident) ([address] (7/18/2018)    Active Problems:    CAD (coronary artery disease) (1/5/2018)      Hypercholesterolemia (1/5/2018)      Atrial fibrillation with slow ventricular response ([address] (7/18/2018)      SSS (sick sinus syndrome) [address] (7/18/2018)    · HTN      RECOMMENDATIONS:   · Pulm: stable respirations; aspiration precautions  · Cardiac: keep SBP>120/60 mmhg; slow Afib - on dopamine drip; cardiology on case ?needs pacemaker eval  · Endo: Tsh 1.5 normal; cortisol normal  · Neuro: ASA, statin; Neuro on case; on coumadin therapy - target INR 2-3  · Fluids: NS 75/hr  · ID: no active issues  · Renal: watch IOs and renal fn; replaced kcl  · GI: SLP team cleared for oral diet  · Hem: stable Hb and platelets; daily cbc  · Endo: poc glucose  · Proph:  DVT proph sc heparin - dc when INR therapeutic; GI proph pepcid  · D/w family - daughter, grand daughter and patient - updated; awaiting icu bed  Will defer respective systems problem management to primary and other consultant and follow patient in ICU with primary and other medical team  Further recommendations will be based on the patient's response to recommended treatment and results of the investigation ordered.   Quality Care: PPI, DVT prophylaxis, HOB elevated, Infection control all reviewed and addressed.   PAIN AND SEDATION: none   · Skin/Wound: no active issues  · Nutrition: oral mechanical soft  · Prophylaxis: DVT and GI Prophylaxis reviewed  · Restraints: none  · PT/OT eval and treat: as needed  · Lines/Tubes: PIVs  ADVANCE DIRECTIVE: Full Code  Ok to tele once she is off dopamine     Mod complexity decision making was performed in this consultation and evaluation of this patient         Aye Love MD

## 2018-07-22 NOTE — PROGRESS NOTES
Problem: Mobility Impaired (Adult and Pediatric)  Goal: *Acute Goals and Plan of Care (Insert Text)  Physical Therapy Goals  Initiated 7/19/2018 and to be accomplished within 3-7 day(s)  1. Patient will move from supine to sit and sit to supine  in bed with supervision/set-up. 2.  Patient will transfer from bed to chair and chair to bed with supervision/set-up using the least restrictive device. 3.  Patient will perform sit to stand with supervision/set-up. 4.  Patient will ambulate with supervision/set-up for 150 feet with the least restrictive device. 5.  Patient will ascend/descend 6 stairs with B handrail(s) with minimal assistance/contact guard assist.   Outcome: Progressing Towards Goal  .physical Therapy TREATMENT    Patient: Eufemia Mckoy (89 y.o. female)  Date: 7/21/2018  Diagnosis: Atrial fibrillation with slow ventricular response (HCC) Acute CVA (cerebrovascular accident) [facility])       Precautions: Fall   Chart, physical therapy assessment, plan of care and goals were reviewed. ASSESSMENT:  Pt continue to exhibit increasing mobility and requiring very little assist to accomplish today's output. Pt's HR is very subdued, with range of 56-93 throughout session. Pt is safe to mobilize with NSG. Will continue to advance activity as tolerated. Progression toward goals:  []      Improving appropriately and progressing toward goals  [x]      Improving slowly and progressing toward goals  []      Not making progress toward goals and plan of care will be adjusted     PLAN:  Patient continues to benefit from skilled intervention to address the above impairments. Continue treatment per established plan of care. Discharge Recommendations:  Home Health or To Be Determined  Further Equipment Recommendations for Discharge:  bedside commode and rolling walker     SUBJECTIVE:   Patient stated I think I can do a little better today.     OBJECTIVE DATA SUMMARY:   Critical Behavior:  Neurologic State: Alert  Orientation Level: Oriented X4  Cognition: Appropriate decision making, Appropriate for age attention/concentration, Appropriate safety awareness, Follows commands  Safety/Judgement: Awareness of environment, Good awareness of safety precautions  Functional Mobility Training:  Bed Mobility:  Rolling: Supervision  Supine to Sit: Supervision  Sit to Supine: Supervision  Transfers:  Sit to Stand: Stand-by assistance;Contact guard assistance  Stand to Sit: Contact guard assistance  Balance:  Sitting: Intact  Standing: Intact; With support  Standing - Static: Good  Standing - Dynamic : Fair  Ambulation/Gait Training:  Distance (ft): 350 Feet (ft)  Assistive Device: Walker, rolling;Gait belt  Ambulation - Level of Assistance: Stand-by assistance  Gait Abnormalities: Decreased step clearance; Step to gait  Base of Support: Narrowed  Speed/Tangela: Slow  Step Length: Left shortened;Right shortened  Therapeutic Exercises:       EXERCISE   Sets   Reps   Active Active Assist   Passive Self ROM   Comments   Ankle Pumps 1 30  [x] [] [] []    Quad Sets/Glut Sets 1 10 [x] [] [] []    Hamstring Sets 1 10 [x] [] [] []    Short Arc Quads 1 10 [x] [] [] []    Heel Slides   [] [] [] []    Straight Leg Raises   [] [] [] []    Hip Abd/Add 1 10 [x] [] [] []    Long Arc Quads 1 10 [x] [] [] []    Seated Marching 1 10 [x] [] [] []    Standing Marching   [] [] [] []       [] [] [] []      Pain:  Pain Scale 1: Numeric (0 - 10)  Pain Intensity 1: 0   Pain out: 0  Activity Tolerance:   Fair  Please refer to the flowsheet for vital signs taken during this treatment.   After treatment:   [] Patient left in no apparent distress sitting up in chair  [x] Patient left in no apparent distress in bed  [x] Call bell left within reach  [x] Nursing notified  [] Caregiver present  [] Bed alarm activated      Oneal Lopez PTA   Time Calculation: 40 mins

## 2018-07-23 LAB
BASOPHILS # BLD: 0 K/UL (ref 0–0.1)
BASOPHILS NFR BLD: 0 % (ref 0–2)
DIFFERENTIAL METHOD BLD: NORMAL
EOSINOPHIL # BLD: 0.1 K/UL (ref 0–0.4)
EOSINOPHIL NFR BLD: 2 % (ref 0–5)
ERYTHROCYTE [DISTWIDTH] IN BLOOD BY AUTOMATED COUNT: 13 % (ref 11.6–14.5)
HCT VFR BLD AUTO: 40.6 % (ref 35–45)
HGB BLD-MCNC: 13.3 G/DL (ref 12–16)
INR PPP: 2.4 (ref 0.8–1.2)
LYMPHOCYTES # BLD: 1.4 K/UL (ref 0.9–3.6)
LYMPHOCYTES NFR BLD: 23 % (ref 21–52)
MAGNESIUM SERPL-MCNC: 2 MG/DL (ref 1.6–2.6)
MCH RBC QN AUTO: 29.8 PG (ref 24–34)
MCHC RBC AUTO-ENTMCNC: 32.8 G/DL (ref 31–37)
MCV RBC AUTO: 90.8 FL (ref 74–97)
MONOCYTES # BLD: 0.5 K/UL (ref 0.05–1.2)
MONOCYTES NFR BLD: 8 % (ref 3–10)
NEUTS SEG # BLD: 4.2 K/UL (ref 1.8–8)
NEUTS SEG NFR BLD: 67 % (ref 40–73)
PLATELET # BLD AUTO: 216 K/UL (ref 135–420)
PMV BLD AUTO: 10 FL (ref 9.2–11.8)
POTASSIUM SERPL-SCNC: 4.3 MMOL/L (ref 3.5–5.5)
PROTHROMBIN TIME: 25.8 SEC (ref 11.5–15.2)
RBC # BLD AUTO: 4.47 M/UL (ref 4.2–5.3)
WBC # BLD AUTO: 6.2 K/UL (ref 4.6–13.2)

## 2018-07-23 PROCEDURE — 76450000000

## 2018-07-23 PROCEDURE — 97530 THERAPEUTIC ACTIVITIES: CPT

## 2018-07-23 PROCEDURE — 74011250636 HC RX REV CODE- 250/636: Performed by: INTERNAL MEDICINE

## 2018-07-23 PROCEDURE — 85610 PROTHROMBIN TIME: CPT | Performed by: PSYCHIATRY & NEUROLOGY

## 2018-07-23 PROCEDURE — 85025 COMPLETE CBC W/AUTO DIFF WBC: CPT | Performed by: PSYCHIATRY & NEUROLOGY

## 2018-07-23 PROCEDURE — 97116 GAIT TRAINING THERAPY: CPT

## 2018-07-23 PROCEDURE — 83735 ASSAY OF MAGNESIUM: CPT | Performed by: INTERNAL MEDICINE

## 2018-07-23 PROCEDURE — 92526 ORAL FUNCTION THERAPY: CPT

## 2018-07-23 PROCEDURE — 74011250637 HC RX REV CODE- 250/637: Performed by: PSYCHIATRY & NEUROLOGY

## 2018-07-23 PROCEDURE — 74011250637 HC RX REV CODE- 250/637: Performed by: INTERNAL MEDICINE

## 2018-07-23 PROCEDURE — 65610000006 HC RM INTENSIVE CARE

## 2018-07-23 PROCEDURE — 97535 SELF CARE MNGMENT TRAINING: CPT

## 2018-07-23 PROCEDURE — 84132 ASSAY OF SERUM POTASSIUM: CPT | Performed by: INTERNAL MEDICINE

## 2018-07-23 RX ORDER — WARFARIN 2 MG/1
2 TABLET ORAL ONCE
Status: COMPLETED | OUTPATIENT
Start: 2018-07-23 | End: 2018-07-23

## 2018-07-23 RX ADMIN — POTASSIUM CHLORIDE 20 MEQ: 20 SOLUTION ORAL at 00:12

## 2018-07-23 RX ADMIN — Medication 10 ML: at 07:03

## 2018-07-23 RX ADMIN — SODIUM CHLORIDE 75 ML/HR: 900 INJECTION, SOLUTION INTRAVENOUS at 14:19

## 2018-07-23 RX ADMIN — SODIUM CHLORIDE 75 ML/HR: 900 INJECTION, SOLUTION INTRAVENOUS at 00:17

## 2018-07-23 RX ADMIN — WARFARIN SODIUM 2 MG: 2 TABLET ORAL at 18:32

## 2018-07-23 RX ADMIN — ONDANSETRON 4 MG: 2 INJECTION INTRAMUSCULAR; INTRAVENOUS at 00:42

## 2018-07-23 RX ADMIN — Medication 10 ML: at 21:15

## 2018-07-23 RX ADMIN — HEPARIN SODIUM 5000 UNITS: 5000 INJECTION, SOLUTION INTRAVENOUS; SUBCUTANEOUS at 07:02

## 2018-07-23 RX ADMIN — MAGNESIUM SULFATE HEPTAHYDRATE 1 G: 1 INJECTION, SOLUTION INTRAVENOUS at 00:12

## 2018-07-23 RX ADMIN — Medication 10 ML: at 14:19

## 2018-07-23 RX ADMIN — FAMOTIDINE 20 MG: 20 TABLET ORAL at 09:00

## 2018-07-23 RX ADMIN — ATORVASTATIN CALCIUM 80 MG: 20 TABLET, FILM COATED ORAL at 09:13

## 2018-07-23 NOTE — PROGRESS NOTES
conducted a Follow up consultation and Spiritual Assessment for Clifford Judge, who is a 80 y.o.,female. Patient stated that she has good help from her son and daughter. She is active at a [employer]. The [employer] is aware she is here. The  provided the following Interventions:  Continued the relationship of care and support. Listened empathically. Offered prayer and assurance of continued prayer on patients behalf. Chart reviewed. The following outcomes were achieved:  Patient expressed gratitude for Spiritual Care visit. Patient was reviewed in ICU Interdisciplinary Rounds. Assessment:  There are no further spiritual or Adventist issues which require Spiritual Care Services intervention at this time. Plan:  Chaplains will continue to follow and will provide pastoral care as needed or requested.  recommends bedside caregivers page the  on duty if patient shows signs of acute spiritual or emotional distress. [id number] [id number].    Board Certified   [phone] - Office

## 2018-07-23 NOTE — PROGRESS NOTES
NEUROLOGY PROGRESS NOTE        Patient: Mae Momin        Sex: female          DOA: 7/18/2018  YOB: 1930      Age:  80 y.o.         LOS: 5 days     Identification:  [id number] right-handed female with history of hypertension, hyperlipidemia, CAD/MI and atrial fibrillation (on aspirin only), who presented with left sided weakness.       SUBJECTIVE:   The patient is stable with no new weakness or numbness. INR is 2.4. We will d/c aspirin      Stroke Work-up:  Brain MRI: 1. Multifocal small areas of acute ischemic infarct right MCA territory including right insula, deep white matter and posterior right frontal cortex. 2. Interval progression of moderate bilateral deep white matter signal changes nonspecific, likely chronic microvascular ischemic disease. 3. Slight progression of mild diffuse volume loss. CTA of head and neck: 1. No hemodynamically significant cervical vascular stenosis. 2. Thromboembolic occlusion inferior segment origin right M2 with reconstitution distally, and 7 mm long thromboembolic segment involving proximal superior segment right M2 with associated severe stenosis. 3. Remainder CTA head unremarkable. Echocardiogram: There is no evidence of intracrdiac large shunt. Late bubbles suggest an extra cardiac shunt. Left ventricular hyperdynamic systolic function. Estimated left ventricular ejection fraction is >70%. Please note that echo was performed when pt was on dopamine infusion 5 microgram/kg/min. Left atrial cavity size is moderately dilated. Right atrial cavity size is moderately dilated. Aortic valve is trileaflet. Aortic valve sclerosis. Mitral valve non-specific thickening. Mild to moderate mitral valve regurgitation. Moderate tricuspid valve regurgitation is present. Pulmonary arterial systolic pressure is 42 mmHg. Moderate pulmonary hypertension is present.   Lipid panel:                 Lab Results   Component Value Date/Time      Cholesterol, total 200 (H) 07/19/2018 04:13 AM      HDL Cholesterol 62 (H) 07/19/2018 04:13 AM      LDL, calculated 119.4 (H) 07/19/2018 04:13 AM      VLDL, calculated 18.6 07/19/2018 04:13 AM      Triglyceride 93 07/19/2018 04:13 AM      CHOL/HDL Ratio 3.2 07/19/2018 04:13 AM       HbA1c:                 Lab Results   Component Value Date/Time      Hemoglobin A1c 5.0 07/19/2018 04:13 AM           REVIEW OF SYSTEMS: Denies chest pain, abdominal pain, nausea or vomiting. No fever or chills. OBJECTIVE:      Visit Vitals    /58    Pulse (!) 59    Temp 97.4 °F (36.3 °C)    Resp 13    Ht 5' (1.524 m)    Wt 70.2 kg (154 lb 12.2 oz)    SpO2 95%    BMI 30.23 kg/m2     Physical Exam:  GEN: Alert, NAD  EYES: conjunctiva normal, lids with out lesions  HEENT: MMM. HEART: RRR +S1 +S2  LUNGS: CTA B/L no rales or rhonchi. ABDOMEN: Soft, non-tender. EXTREMITIES: No edema cyanosis  SKIN: no rashes or skin breakdown, no nodules  NEURO: Alert, oriented x3. Speech is fluent. Cranials: Face is symmetric. Left asymmetry, subtle, with smile. EOMI, VFF. Tongue is midline. Motor: Full strength at all sites. No pronator drift. Sensory: Intact to crude touch on all four. Coordination: Intact with no dysmetria during FNF.      Current Facility-Administered Medications   Medication Dose Route Frequency    DOPamine (INTROPIN) 800 mg in dextrose 5% 500 mL infusion  0-20 mcg/kg/min IntraVENous TITRATE    ondansetron (ZOFRAN) injection 4 mg  4 mg IntraVENous Q6H PRN    ELECTROLYTE REPLACEMENT PROTOCOL-POTASSIUM  1 Each Other PRN    ELECTROLYTE REPLACEMENT PROTOCOL-MAGNESIUM  1 Each Other PRN    atorvastatin (LIPITOR) tablet 80 mg  80 mg Oral DAILY    0.9% sodium chloride infusion  75 mL/hr IntraVENous CONTINUOUS    aspirin chewable tablet 324 mg  324 mg Oral DAILY    famotidine (PEPCID) tablet 20 mg  20 mg Oral DAILY    Warfarin - Pharmacy to dose    Other Rx Dosing/Monitoring    sodium chloride (NS) flush 5-10 mL  5-10 mL IntraVENous Q8H    sodium chloride (NS) flush 5-10 mL  5-10 mL IntraVENous PRN    heparin (porcine) injection 5,000 Units  5,000 Units SubCUTAneous Q8H       Laboratory  Recent Results (from the past 24 hour(s))   POTASSIUM    Collection Time: 07/22/18 11:00 PM   Result Value Ref Range    Potassium 3.8 3.5 - 5.5 mmol/L   MAGNESIUM    Collection Time: 07/22/18 11:00 PM   Result Value Ref Range    Magnesium 1.7 1.6 - 2.6 mg/dL   PROTHROMBIN TIME + INR    Collection Time: 07/23/18  4:30 AM   Result Value Ref Range    Prothrombin time 25.8 (H) 11.5 - 15.2 sec    INR 2.4 (H) 0.8 - 1.2     CBC WITH AUTOMATED DIFF    Collection Time: 07/23/18  4:30 AM   Result Value Ref Range    WBC 6.2 4.6 - 13.2 K/uL    RBC 4.47 4.20 - 5.30 M/uL    HGB 13.3 12.0 - 16.0 g/dL    HCT 40.6 35.0 - 45.0 %    MCV 90.8 74.0 - 97.0 FL    MCH 29.8 24.0 - 34.0 PG    MCHC 32.8 31.0 - 37.0 g/dL    RDW 13.0 11.6 - 14.5 %    PLATELET 587 848 - 430 K/uL    MPV 10.0 9.2 - 11.8 FL    NEUTROPHILS 67 40 - 73 %    LYMPHOCYTES 23 21 - 52 %    MONOCYTES 8 3 - 10 %    EOSINOPHILS 2 0 - 5 %    BASOPHILS 0 0 - 2 %    ABS. NEUTROPHILS 4.2 1.8 - 8.0 K/UL    ABS. LYMPHOCYTES 1.4 0.9 - 3.6 K/UL    ABS. MONOCYTES 0.5 0.05 - 1.2 K/UL    ABS. EOSINOPHILS 0.1 0.0 - 0.4 K/UL    ABS. BASOPHILS 0.0 0.0 - 0.1 K/UL    DF AUTOMATED     POTASSIUM    Collection Time: 07/23/18  4:30 AM   Result Value Ref Range    Potassium 4.3 3.5 - 5.5 mmol/L   MAGNESIUM    Collection Time: 07/23/18  4:30 AM   Result Value Ref Range    Magnesium 2.0 1.6 - 2.6 mg/dL       Radiology:  Mri Brain Wo Cont    Result Date: 7/18/2018  History: Left-sided facial droop and slurred speech, CVA Correlation CTA head and neck same day and comparison MRI 4/13/2013 PROCEDURE: Axial spin echo T1, FSE T2, FLAIR, T2 gradient and diffusion sequences, sagittal spin echo T1, and coronal spin echo T1 and T2 sequences of the brain were obtained without gadolinium.  FINDINGS:  Diffusion:  There are multifocal small areas of restricted diffusion signal involving right insular cortex, right corona radiata, and cortex and subcortical white matter of posterior right frontal lobe. Brain parenchyma: There has been progression of moderate confluent and multifocal increased T2 and FLAIR signal periventricular and deep cerebral white matter including subcortical white matter since prior MRI. No evidence of new intracranial mass or hemorrhage or mass effect. No other new cortical signal abnormality. Ventricles and sulci:  Slight increased mild diffuse central and cortical volume loss. Extra-axial:  No extra-axial fluid collection or mass is noted. Brain vasculature:  No vascular abnormality is appreciated on this routine brain MR examination. Craniocervical junction:  Normal. Skull base, extracranial and calvarium:  Stable mild increased T2 signal inferior right mastoid. Orbits, paranasal sinuses, IACs sella and skull unremarkable. Impression: 1. Multifocal small areas of acute ischemic infarct right MCA territory including right insula, deep white matter and posterior right frontal cortex. 2. Interval progression of moderate bilateral deep white matter signal changes nonspecific, likely chronic microvascular ischemic disease. 3. Slight progression of mild diffuse volume loss. Ct Head Wo Cont    Result Date: 7/18/2018  EXAM: CT head INDICATION: Left-sided weakness, slurred speech since 8:00 AM. CODE S. COMPARISON: 04/12/13. TECHNIQUE: Axial CT imaging of the head  was obtained from skull base to vertex without intravenous contrast. One or more dose reduction techniques were used on this CT: automated exposure control, adjustment of the mAs and/or kVp according to patient's size, and iterative reconstruction techniques.  The specific techniques utilized on this CT exam have been documented in the patient's electronic medical record. _______________ FINDINGS: BRAIN AND POSTERIOR FOSSA: The sulci, folia, ventricles and basal cisterns are mildly prominent. Patchy periventricular, subcortical and deep white matter hypodensities. Stable well-circumscribed hypodensity in the right basal ganglia. . No acute intracranial hemorrhage, cortical infarct or mass effect/mass lesion. EXTRA-AXIAL SPACES AND MENINGES: There are no abnormal extra-axial fluid collections. CALVARIUM: No acute osseous abnormality. Marilynne Ruts SINUSES: The visualized portions of the paranasal sinuses and mastoid air cells are well aerated. OTHER: None. _______________     IMPRESSION: 1. Progressive white matter findings, nonspecific but typically reflecting progressive chronic ischemic change in a patient of this age. Stable right basal ganglia lacunar infarct. Mild parenchymal volume loss/atrophy. 2. No CT evidence of acute intracranial hematoma, cortical infarct, or mass effect/mass lesion. Please note that noncontrast head CT can be negative in the setting of acute/subacute ischemia/infarct, and in the high index of clinical suspicion, MRI could best further assess. Critical result call to Dr. Hermelinda Garcia in ER, at 8:50 AM on 07/18/18, as per stroke alert/CODE S protocol. Xr Chest Port    Result Date: 7/18/2018  CLINICAL: Left-sided weakness. Cardiac arrhythmia. COMPARISON: January 5, 2018. A portable view of the chest from 0954 hours: Lung fields appear clear. Minimal blunting left costophrenic angle. No focal airspace disease. Thoracolumbar scoliosis. Mediastinal silhouette stable. Atherosclerotic calcifications in the aortic arch . Impression: Possible small left pleural effusion. Otherwise unremarkable    Ir Fluoro Guide Picc Insertion    Result Date: 7/20/2018  PIC LINE PLACEMENT VIA ULTRASOUND WITH FLUOROSCOPY:7/20/2018 REASON FOR EXAM: Impaired cardiac output.   IV access needed for IV pressor support TECHNIQUE:  After informed consent was obtained from the patient, the right arm is prepped and draped in the usual sterile fashion; including maximal sterile barrier technique consisting of cap, mask, sterile gown, sterile gloves, a large sterile field, standard hand hygiene, and cutaneous antisepsis with 2% chlorhexidine. Under sterile ultrasound guidance with a sterile probe cover and sterile gel, the antecubital vein was accessed using a 21-gauge needle. An ultrasound image was stored. Subsequently, a .018 guide wire was then placed through the needle and into the brachial vein of the right upper extremity vein utilizing fluoroscopy, and a 5 [place] peel-away sheath is placed at the antecubital region. The 018 guide wire was then removed and replaced by a 5 [employer] [place] , 35 cm dual lumen PIC line. The position is then confirmed on x-ray, which demonstrates tip position in the SVC. [facility] line was secured using a Statlock anchoring device and a sterile bandage was then applied. No immediate complications occurred. IMPRESSION: Uncomplicated placement of a PIC line in the right  upper extremity. The PICC is radiologically ready for use. Cta Head Neck W Cont    Result Date: 7/18/2018  EXAM:  CT angiogram of the head and neck with intravenous contrast. CLINICAL INDICATION/HISTORY:Left-sided weakness, slurred speech, CVA. COMPARISON: Correlation CT head same day. TECHNIQUE:  Following IV administration of nonionic contrast, helical CTA head and neck performed. Three-dimensional, maximum intensity projection, and curved planar reformations were post-processed at a dedicated outside facility ([id number]). Stenoses are reported with reference to the downstream lumen (\"NASCET\"). One or more dose reduction techniques were used on this CT: automated exposure control, adjustment of the mAs and/or kVp according to patient's size, and iterative reconstruction techniques.  The specific techniques utilized on this CT exam have been documented in the patient's electronic medical record. _______________ FINDINGS: NECK CTA:      ARTERIAL BOLUS QUALITY: Satisfactory. AORTIC ARCH: Origin of the arch vessels not included on field of view. No proximal great vessel stenosis. RIGHT CAROTID ARTERY:  No significant common carotid or internal carotid artery stenosis, 0% diameter stenosis proximal right ICA. LEFT CAROTID ARTERY:  No significant common carotid or internal carotid artery stenosis, 0% diameter stenosis proximal left ICA. VERTEBRAL ARTERIES: The vertebral arteries are codominant. RIGHT VERTEBRAL ARTERY: No stenosis or other vascular abnormality. LEFT VERTEBRAL ARTERY: No stenosis or other vascular abnormality. LUNG APICES: Mild biapical parenchymal and pleural scarring. NECK SOFT TISSUES: Heterogeneous thyroid with several small hypodensities the largest right upper pole 8 mm. No other mass or adenopathy. OSSEOUS: Degenerative spondylosis, no high-grade canal stenosis. BRAIN CTA:      CAROTID SIPHON AND SUPRACLINOID ICA: No significant stenosis. M1 SEGMENT MCA: No significant stenosis or aneurysm. PROXIMAL M2 SEGMENT MCA: There is thromboembolic occlusion origin inferior segment right M2 with some reconstitution of distal vessels. There is also 7 mm long segment of thromboembolic filling defect with severe narrowing of the vessel involving proximal superior segment M2. Left side appears normal.      A1 SEGMENT, ANTERIOR COMMUNICATING ARTERY AND PROXIMAL A2 SEGMENTS:           Incidental normal variant single azygos A2 segment. No significant stenosis or aneurysm. VERTEBRAL BASILAR SYSTEM:No significant stenosis or aneurysm. PCA:No significant stenosis or aneurysm. DISTAL ANTERIOR CEREBRAL ARTERY:No significant stenosis or aneurysm. DISTAL MCA M2/M3 SEGMENT:No significant stenosis or aneurysm. DURAL VENOUS SINUSES: Unremarkable. BRAIN PARENCHYMA ON SOURCE DATA: No evidence of intracranial mass or enhancing lesion or significant mass effect.  Mild asymmetric paucity of cortical vessels peripherally right MCA territory but with collateral flow noted. _______________     IMPRESSION: 1. No hemodynamically significant cervical vascular stenosis. 2. Thromboembolic occlusion inferior segment origin right M2 with reconstitution distally, and 7 mm long thromboembolic segment involving proximal superior segment right M2 with associated severe stenosis. 3. Remainder CTA head unremarkable. ASSESSMENT/IMPRESSION:   Right MCA infarcts, cardioembolic in nature. The patient needs to be on anticoagulation for secondary prevention. INR is at the goal level.       RECOMMENDATIONS:  1. Discontinue aspirin. Continue atorvastatin to 80mg daily. 2. Continue Coumadin with a goal INR between 2.0 and 3.0. At goal level now. 3. Neuro checks per stroke protocol  4. Normotensive protocol. GOAL BP >110/60 if possible. Avoid hypotension. 5. IV normal saline continuous infusion 100-125 ml/h as neded. 6. PT/OT and speech assessment.     OK to transfer to telemetry unit.      Neurology signs off at this time. I will follow the patient as outpatient. Please do not hesitate to return with any questions.     Signed:  Akil Duval MD  7/23/2018  8:31 AM

## 2018-07-23 NOTE — PROGRESS NOTES
Problem: Dysphagia (Adult)  Goal: *Acute Goals and Plan of Care (Insert Text)  Recommendations:  Diet: Mech-soft, chopped meat/thin   Meds: Crushed in puree  Aspiration Precautions  Oral Care TID    Goals:  Patient will:  1. Tolerate PO trials with 0 s/s overt distress in 4/5 trials - goal met  2. Utilize compensatory swallow strategies/maneuvers (decrease bite/sip, size/rate, alt. liq/sol) with min cues in 4/5 trials - goal met         Outcome: Resolved/Met Date Met: 07/23/18  Speech language pathology dysphagia treatment & discharge    Patient: Mariann Hartman (77 y.o. female)  Date: 7/23/2018  Diagnosis: Atrial fibrillation with slow ventricular response (HCC) Acute CVA (cerebrovascular accident) ([facility] [address]       Precautions: Aspiration Fall  PLOF: Living with family    ASSESSMENT:  Followed up this day with dysphagia tx. A&Ox4. Speech WFL, expressive/recepetive language intact. Pt observed with thin liquid, puree and solid trials. Pt demo mildly delayed mastication with solid trial, able to clear bolus effectively given increased time. 0 overt s/sx of aspiration. Recommend continue mech-soft, chopped meat/thin liquid diet with aspiration precautions. Patient currently at baseline diet. Reviewed safe swallowing guidelines with pt, verbalized comprehension. Maximum therapeutic gains met; safest, least restrictive diet achieved in current in-patient/acute setting. Accordingly, SLP to d/c intervention at this time. Progression toward goals:  [x]         Improving appropriately - goals met/approximated  []         Not making progress/Not appropriate - will d/c POC     PLAN:  Recommendations and Planned Interventions:  Maximum therapeutic gains met; safest, least restrictive diet achieved. D/C ST intervention at this time. Discharge Recommendations:  None     SUBJECTIVE:   Patient stated I am fine.     OBJECTIVE:   Cognitive and Communication Status:  Neurologic State: Alert  Orientation Level: Oriented X4  Cognition: Follows commands, Memory loss  Perception: Cues to attend left visual field  Perseveration: No perseveration noted  Safety/Judgement: Awareness of environment  Dysphagia Treatment:  Oral Assessment:  Oral Assessment  Labial: Left droop  Dentition: Upper & lower dentures  Oral Hygiene: Good  Lingual: Decreased rate  Velum: No impairment  Mandible: No impairment  P.O. Trials:   Patient Position: HOB 60   Vocal quality prior to P.O.: No impairment   Consistency Presented: Thin liquid, Solid, Puree   How Presented: Self-fed/presented, Straw       Bolus Acceptance: No impairment   Bolus Formation/Control: Impaired   Type of Impairment: Delayed, Mastication   Propulsion: No impairment   Oral Residue: None   Initiation of Swallow: No impairment   Laryngeal Elevation: Functional   Aspiration Signs/Symptoms: None   Pharyngeal Phase Characteristics: Audible swallow   Effective Modifications: Alternate liquids/solids, Small sips and bites   Cues for Modifications: Minimal         Oral Phase Severity: Mild   Pharyngeal Phase Severity : Mild    PAIN:  Start of Tx: 0  End of Tx: 0     GCODESwallowing:  Swallow Current Status CJ= 20-39%   Swallow Goal Status CJ= 20-39%   Swallow D/C Status CJ= 20-39%    The severity rating is based on the following outcomes:  LIDIA Noms Swallow Level 5    Clinical Judgement    After treatment:   []              Patient left in no apparent distress sitting up in chair  [x]              Patient left in no apparent distress in bed  [x]              Call bell left within reach  [x]              Nursing notified  []              Caregiver present  []              Bed alarm activated       COMMUNICATION/EDUCATION:   [x] Safe swallowing guidelines; compensatory techniques  []        Patient unable to participate in education; education ongoing with staff  []  Posted safety precautions in patient's room.   [] Oral-motor/laryngeal strengthening exercises    Geremias Guthrie SLP  Time Calculation: 15 mins

## 2018-07-23 NOTE — PROGRESS NOTES
2348  Pt is resting quietly in bed, awakens easily. Ox 3 with some clues to exact date. Denies pain, shortness of breath, lightheadedness. HR 60's, AFib. Dopamine infusing into PICC without diff. Lungs dim. Throughout, on RA. Denies needs. Call bell in reach. 0100  Assisted to [facility] with minimal assist. Complete bath and bed linen change done, pt back to bed in NAD.    0255  Resting quietly. Sleep apneas noted with sats into 76, self-recovery. 0424 Blood drawn from PICC without difficulties and sent to lab. Up to [facility] to void. No c/o.     0650  Resting quietly, awakens easily. No c/o voiced.

## 2018-07-23 NOTE — PROGRESS NOTES
[facility]  Pulmonary, Critical Care, and Sleep Medicine    Pulm/CC Note    Name: Mae Momin MRN: [id number]   : 1930 Hospital: [facility] [place]   Date: 2018  Room: [facility]     Subjective:   Patient is a 80 y. o. female with hx of HTN, hyperlipidemia, CAD/MI and atrial fibrillation (on aspirin only, not taking coumadin), who presented with left sided upper and lower extremity weakness, slurred speech and left facial droop on 18. The patient was evaluated by tele neurology, onset was not clear and not given tPA. CT head nil acute. Patient was bradycardic with slow Afib and hypotensive - started on dopamine which is at 5 mcg/kg/min.    18  Patient awake, alert, follows all commands  Remains in slow rate Afib requiring dopamine and attempts in AM to taper resulting in HR goes to 30-40s asymptomatic  BP remains stable  No cough or CP or SOB  No worsening left sided weakness  Afebrile.  Tolerating PO diet    Review of Systems   General ROS: negative for  - fever, chills, weight loss, fatigue and malaise  Cardiovascular ROS: negative for - chest pain, edema, murmur, orthopnea, palpitations or PND  Gastrointestinal ROS: no abdominal pain, change in bowel habits, or black or bloody stools  Dermatological ROS: negative for - pruritus, rash or skin lesion changes       Past Medical History:   Diagnosis Date    Arthritis     Atrial fibrillation ([address]     CAD (coronary artery disease)     Edema     Gastrointestinal disorder     High cholesterol     Hypertension     Hypoglycemia     Hypoglycemia     IBS (irritable bowel syndrome)     MI, old       Past Surgical History:   Procedure Laterality Date    HX CHOLECYSTECTOMY      HX KNEE REPLACEMENT      HX ORTHOPAEDIC      Left TKR      Allergies   Allergen Reactions    Amoxicillin Rash      Social History   Substance Use Topics    Smoking status: Never Smoker    Smokeless tobacco: Not on file    Alcohol use 0.5 oz/week     1 Glasses of wine per week      Comment: every few weeks        Current Facility-Administered Medications   Medication Dose Route Frequency    DOPamine (INTROPIN) 800 mg in dextrose 5% 500 mL infusion  0-20 mcg/kg/min IntraVENous TITRATE    atorvastatin (LIPITOR) tablet 80 mg  80 mg Oral DAILY    0.9% sodium chloride infusion  75 mL/hr IntraVENous CONTINUOUS    famotidine (PEPCID) tablet 20 mg  20 mg Oral DAILY    Warfarin - Pharmacy to dose    Other Rx Dosing/Monitoring    sodium chloride (NS) flush 5-10 mL  5-10 mL IntraVENous Q8H       Latest lactic acid:   Lactic acid   Date Value Ref Range Status   2012 1.3 0.4 - 2.0 MMOL/L Final         Objective:   Vital Signs:    Visit Vitals    /58    Pulse (!) 59    Temp 97.6 °F (36.4 °C)    Resp 13    Ht 5' (1.524 m)    Wt 70.2 kg (154 lb 12.2 oz)    SpO2 95%    BMI 30.23 kg/m2       O2 Device: Room air   Temp (24hrs), Av °F (36.7 °C), Min:97.4 °F (36.3 °C), Max:98.9 °F (37.2 °C)       Intake/Output:   Last shift:      701 -  1900  In: 240 [P.O.:240]  Out: 750 [Urine:750]  Last 3 shifts: 1901 -  0700  In: 2806.4 [P.O.:240;  I.V.:2566.4]  Out: 5920 [Urine:5170]    Intake/Output Summary (Last 24 hours) at 18 1149  Last data filed at 18 1056   Gross per 24 hour   Intake          1688.12 ml   Output             3920 ml   Net         -2231.88 ml           Physical Exam:   Gene: comfortable; on room air; acyanotic; awake, alert, following all commands  Neck: No adenopathy or thyroid swelling  CVS: S1S2 no murmurs; JVD not elevated  RS: Good air entry bilaterally, no wheezes or crackles  Abd: soft, non tender, no hepatosplenomegaly, no abd distension  Neuro: awake, alert, no facial droop; no left sided neglect; left UE and LE strength 4+/5  Extrm: no leg edema or swelling or clubbing  Skin: no rash  Lymphatic: no cervical or supraclavicular adenopathy      Data review:     Recent Results (from the past 12 hour(s))   PROTHROMBIN TIME + INR    Collection Time: 07/23/18  4:30 AM   Result Value Ref Range    Prothrombin time 25.8 (H) 11.5 - 15.2 sec    INR 2.4 (H) 0.8 - 1.2     CBC WITH AUTOMATED DIFF    Collection Time: 07/23/18  4:30 AM   Result Value Ref Range    WBC 6.2 4.6 - 13.2 K/uL    RBC 4.47 4.20 - 5.30 M/uL    HGB 13.3 12.0 - 16.0 g/dL    HCT 40.6 35.0 - 45.0 %    MCV 90.8 74.0 - 97.0 FL    MCH 29.8 24.0 - 34.0 PG    MCHC 32.8 31.0 - 37.0 g/dL    RDW 13.0 11.6 - 14.5 %    PLATELET 102 193 - 529 K/uL    MPV 10.0 9.2 - 11.8 FL    NEUTROPHILS 67 40 - 73 %    LYMPHOCYTES 23 21 - 52 %    MONOCYTES 8 3 - 10 %    EOSINOPHILS 2 0 - 5 %    BASOPHILS 0 0 - 2 %    ABS. NEUTROPHILS 4.2 1.8 - 8.0 K/UL    ABS. LYMPHOCYTES 1.4 0.9 - 3.6 K/UL    ABS. MONOCYTES 0.5 0.05 - 1.2 K/UL    ABS. EOSINOPHILS 0.1 0.0 - 0.4 K/UL    ABS. BASOPHILS 0.0 0.0 - 0.1 K/UL    DF AUTOMATED     POTASSIUM    Collection Time: 07/23/18  4:30 AM   Result Value Ref Range    Potassium 4.3 3.5 - 5.5 mmol/L   MAGNESIUM    Collection Time: 07/23/18  4:30 AM   Result Value Ref Range    Magnesium 2.0 1.6 - 2.6 mg/dL             No results for input(s): FIO2I, IFO2, HCO3I, IHCO3, HCOPOC, PCO2I, PCOPOC, IPHI, PHI, PHPOC, PO2I, PO2POC in the last 72 hours. No lab exists for component: IPOC2    All Micro Results     None          ECHO   · Saline contrast was given to evaluate for intracardiac shunt. There is no evidence of intracrdiac large shunt  · Late bubbles suggest an extra cardiac shunt  · Left ventricular hyperdynamic systolic function. Estimated left ventricular ejection fraction is >70%. Please note that echo was performed when pt was on dopamine infusion 5 microgram/kg/min. · Left atrial cavity size is moderately dilated. · Right atrial cavity size is moderately dilated. · Aortic valve is trileaflet. Aortic valve sclerosis. · Mitral valve non-specific thickening. Mild to moderate mitral valve regurgitation.   · Moderate tricuspid valve regurgitation is present. Pulmonary arterial systolic pressure is 42 mmHg. Moderate pulmonary hypertension is present. Imaging:  [x]I have personally reviewed the patients chest radiographs images and report       Results from Hospital Encounter encounter on 07/18/18   XR CHEST PORT   Narrative CLINICAL: Left-sided weakness. Cardiac arrhythmia. COMPARISON: January 5, 2018. A portable view of the chest from 0954 hours:    Lung fields appear clear. Minimal blunting left costophrenic angle. No focal  airspace disease. Thoracolumbar scoliosis. Mediastinal silhouette stable. Atherosclerotic calcifications in the aortic arch . Impression Impression:    Possible small left pleural effusion. Otherwise unremarkable        MRI Brain 7/18/18  IMPRESSION  1. Multifocal small areas of acute ischemic infarct right MCA territory  including right insula, deep white matter and posterior right frontal cortex. 2. Interval progression of moderate bilateral deep white matter signal changes  nonspecific, likely chronic microvascular ischemic disease. 3. Slight progression of mild diffuse volume loss.       IMPRESSION:   Principal Problem:    Acute CVA (cerebrovascular accident) ([address] (7/18/2018)    Active Problems:    CAD (coronary artery disease) (1/5/2018)      Hypercholesterolemia (1/5/2018)      Atrial fibrillation with slow ventricular response ([address] (7/18/2018)      SSS (sick sinus syndrome) ([address] (7/18/2018)    · HTN      RECOMMENDATIONS:   · Pulm: compensated respirations; on RA and SPO2 97%, no clubbing or prior pulmonary issues  · Continue aspiration precautions  · Left message with cardiology to discuss ECHO to arrange further work up  · CXR for follow up and ABG on 100% O2 ordered  · Cardiac: keep SBP>120/60 mmhg; slow Afib - on dopamine drip unable to wean; cardiology on case - left message to discuss need of pacemaker  · Endo: TSH normal; cortisol normal  · Neuro: ASA, statin; Neuro on case; on coumadin therapy - target INR 2-3  · Fluids: NS 75/hr  · Renal: watch IOs and renal fn; replaced lytes as needed  · GI: SLP team cleared for oral diet. Tolerating well  · Hem: stable Hb and platelets; daily cbc  · Endo: poc glucose  · Proph:  DVT proph - sc heparin - dc since INR therapeutic; GI proph Pepcid PO  · D/w patient - updated  Will defer respective systems problem management to primary and other consultant and follow patient in ICU with primary and other medical team  Further recommendations will be based on the patient's response to recommended treatment and results of the investigation ordered. Quality Care: PPI, DVT prophylaxis, HOB elevated, Infection control all reviewed and addressed.   PAIN AND SEDATION: none   · Skin/Wound: no active issues  · Nutrition: oral diet as tolerated  · Prophylaxis: DVT and GI Prophylaxis reviewed  · Restraints: none  · PT/OT eval and treat: as needed  · Lines/Tubes: PIVs  ADVANCE DIRECTIVE: Full Code  Ok to tele once she is off dopamine     Mod complexity decision making was performed in this consultation and evaluation of this patient         Rivka Rai MD

## 2018-07-23 NOTE — PROGRESS NOTES
ARU/IPR REFERRAL CONTACT NOTE  [address] [facility]    RE:  [facility]     Follow up on IP Rehab Consult. Based on our pre-admission screening patient does not meet criteria for admission to [facility] due to:    [ ] Documents do not reflect active medical necessity requiring close Physician involvement and Rehabilitation Nursing. [X] Too functional, per documentation, patient does not require both Physical and Occupational Therapy Services at an acute rehabilitation level of intensity. [ ] Too low level, per documentation, patient has not demonstrated tolerance for acute rehabilitation level of intensity. [ ] Other: We recommend the following:  [ ] Re-evaluation of this patient's status when appropriate  [X] Home with Home Care Services  [ ] Outpatient Therapy Services  [ ] Skilled Nursing Facility/Sub Acute Services with extended stay option  [ ] Assisted Living/ Adult Home    Again, Thank you for this consult. Please advise should there be a change in status.      Sincerely,  [facility]  [phone]

## 2018-07-23 NOTE — PROGRESS NOTES
Problem: Falls - Risk of  Goal: *Absence of Falls  Document Steve Fall Risk and appropriate interventions in the flowsheet.    Outcome: Progressing Towards Goal  Fall Risk Interventions:  Mobility Interventions: Patient to call before getting OOB         Medication Interventions: Bed/chair exit alarm    Elimination Interventions: Call light in reach, Toilet paper/wipes in reach, Toileting schedule/hourly rounds    History of Falls Interventions: Door open when patient unattended

## 2018-07-23 NOTE — PROGRESS NOTES
Cardiology Progress Note        Patient: Tata Ulloa        Sex: female          DOA: 7/18/2018  YOB: 1930      Age:  80 y.o.        LOS:  LOS: 5 days   Assessment/Plan     Principal Problem:    Acute CVA (cerebrovascular accident) ([facility] [address] (7/18/2018)    Active Problems:    CAD (coronary artery disease) (1/5/2018)      Hypercholesterolemia (1/5/2018)      Atrial fibrillation with slow ventricular response ([address] (7/18/2018)      SSS (sick sinus syndrome) ([facility] [address] (7/18/2018)        Plan:  DC dopamine   Ambulate the patient  At this point no definite indication  BP is stable  Continue with supportive treatment  Echo not suggestive of PFO/ASD in this pt, no indication for further intervention. Discussed with pt and RNDinora. Subjective:    cc:  Bradycardia  afib        REVIEW OF SYSTEMS:     General: No fevers or chills. Cardiovascular: No chest pain or pressure. No palpitations. No ankle swelling  Pulmonary: No SOB, orthopnea, PND  Gastrointestinal: No nausea, vomiting or diarrhea      Objective:      Visit Vitals    /58    Pulse (!) 59    Temp 97.6 °F (36.4 °C)    Resp 13    Ht 5' (1.524 m)    Wt 70.2 kg (154 lb 12.2 oz)    SpO2 95%    BMI 30.23 kg/m2     Body mass index is 30.23 kg/(m^2). Physical Exam:  General Appearance: Comfortable, not using accessory muscles of respiration. NECK: No JVD, no thyroidomeglay. LUNGS: Clear bilaterally. HEART: S1+S2 audible,    ABD: Non-tender, BS Audible    EXT: No edema, and no cysnosis. VASCULAR EXAM: Pulses are intact. PSYCHIATRIC EXAM: Mood is appropriate. MUSCULOSKELETAL: Grossly no joint deformity. NEUROLOGICAL: No motor and sensory deficit, Cranial nerves II-XII intact.   Medication:  Current Facility-Administered Medications   Medication Dose Route Frequency    ondansetron (ZOFRAN) injection 4 mg  4 mg IntraVENous Q6H PRN    ELECTROLYTE REPLACEMENT PROTOCOL-POTASSIUM  1 Each Other PRN    ELECTROLYTE REPLACEMENT PROTOCOL-MAGNESIUM  1 Each Other PRN    atorvastatin (LIPITOR) tablet 80 mg  80 mg Oral DAILY    0.9% sodium chloride infusion  75 mL/hr IntraVENous CONTINUOUS    famotidine (PEPCID) tablet 20 mg  20 mg Oral DAILY    Warfarin - Pharmacy to dose    Other Rx Dosing/Monitoring    sodium chloride (NS) flush 5-10 mL  5-10 mL IntraVENous Q8H    sodium chloride (NS) flush 5-10 mL  5-10 mL IntraVENous PRN               Lab/Data Reviewed:  Procedures/imaging: see electronic medical records for all procedures/Xrays   and details which were not copied into this note but were reviewed prior to creation of Plan       All lab results for the last 24 hours reviewed.      Recent Labs      07/23/18   0430  07/22/18   0722 07/21/18   0415   WBC  6.2  5.5  5.5   HGB  13.3  12.8  12.4   HCT  40.6  39.5  38.3   PLT  216  217  227     Recent Labs      07/23/18   0430  07/22/18   2300  07/22/18   0722 07/21/18   0415   NA   --    --   141  141   K  4.3  3.8  3.6  3.2*   CL   --    --   106  105   CO2   --    --   25  27   GLU   --    --   108*  101*   BUN   --    --   5*  8   CREA   --    --   0.83  0.87   CA   --    --   9.5  9.1       Signed By: Ervin Rivero MD     July 23, 2018

## 2018-07-23 NOTE — PROGRESS NOTES
Problem: Mobility Impaired (Adult and Pediatric)  Goal: *Acute Goals and Plan of Care (Insert Text)  Physical Therapy Goals  Initiated 7/19/2018 and to be accomplished within 3-7 day(s)  1. Patient will move from supine to sit and sit to supine  in bed with supervision/set-up. 2.  Patient will transfer from bed to chair and chair to bed with supervision/set-up using the least restrictive device. 3.  Patient will perform sit to stand with supervision/set-up. 4.  Patient will ambulate with supervision/set-up for 150 feet with the least restrictive device. 5.  Patient will ascend/descend 6 stairs with B handrail(s) with minimal assistance/contact guard assist.   Outcome: Progressing Towards Goal  physical Therapy TREATMENT    Patient: Heather Navarro (25 y.o. female)  Date: 7/23/2018  Diagnosis: Atrial fibrillation with slow ventricular response (HCC) Acute CVA (cerebrovascular accident) [facility])  Precautions: Fall   Chart, physical therapy assessment, plan of care and goals were reviewed. ASSESSMENT:  Pt continued ambulation with RW, CGA for safety. Pt able to transfer to/from toilet with CGA and stood at sink brushing hair. However pt began to feel dizzy and tired and returned to supine with Arcadio. HR fluctuated throughout session. Increased >100 with continued standing which is when pt reported dizziness. Will continue to progress as pt tolerates. Progression toward goals:  []      Improving appropriately and progressing toward goals  [x]      Improving slowly and progressing toward goals  []      Not making progress toward goals and plan of care will be adjusted     PLAN:  Patient continues to benefit from skilled intervention to address the above impairments. Continue treatment per established plan of care. Discharge Recommendations:  Home Health  Further Equipment Recommendations for Discharge:  rolling walker     SUBJECTIVE:   Patient stated I am feeling about 80% better.     OBJECTIVE DATA SUMMARY:   Critical Behavior:  Neurologic State: Alert  Orientation Level: Oriented X4  Cognition: Follows commands  Safety/Judgement: Awareness of environment, Good awareness of safety precautions  Functional Mobility Training:  Bed Mobility:  Supine to Sit: Supervision  Sit to Supine: Minimum assistance  Transfers:  Sit to Stand: Contact guard assistance  Stand to Sit: Contact guard assistance  Balance:  Sitting: Intact  Standing: Intact; With support  Standing - Static: Good  Standing - Dynamic : Fair  Ambulation/Gait Training:  Distance (ft): 30 Feet (ft)  Assistive Device: Gait belt;Walker, rolling  Ambulation - Level of Assistance: Contact guard assistance  Gait Abnormalities: Decreased step clearance  Speed/Tangela: Slow  Step Length: Right shortened;Left shortened  Pain:  Pain Scale 1: Numeric (0 - 10)  Pain Intensity 1: 0  Activity Tolerance:   Good  Please refer to the flowsheet for vital signs taken during this treatment.   After treatment:   [] Patient left in no apparent distress sitting up in chair  [x] Patient left in no apparent distress in bed  [x] Call bell left within reach  [x] Nursing notified  [] Caregiver present  [] Bed alarm activated      Sangita Wagner   Time Calculation: 23 mins

## 2018-07-23 NOTE — PROGRESS NOTES
Pharmacy Dosing Services: Warfarin    Consult for Warfarin Dosing by Pharmacy by Dr. Everett Bingham provided for this 80 y.o.  female , for indication of CVA. Day of Therapy 5  Dose to achieve an INR goal of 2-3    INR = 2.4 (increased from 1.7 on 7/22/18)  Order entered for  Warfarin  2 mg to be given today at 18:00. Significant drug interactions: none  LABS    PT/INR Lab Results   Component Value Date/Time    INR 2.4 (H) 07/23/2018 04:30 AM      Platelets Lab Results   Component Value Date/Time    PLATELET 488 22/04/4275 04:30 AM      H/H     Lab Results   Component Value Date/Time    HGB 13.3 07/23/2018 04:30 AM        Warfarin Administrations (last 168 hours)       Date/Time Action Medication Dose      07/22/18 1859 Given    warfarin (COUMADIN) tablet 3 mg 3 mg    07/21/18 1709 Given    warfarin (COUMADIN) tablet 7.5 mg 7.5 mg    07/20/18 1743 Given    warfarin (COUMADIN) tablet 5 mg 5 mg    07/19/18 1849 Given    warfarin (COUMADIN) tablet 5 mg 5 mg          Pharmacy to follow daily and will provide subsequent Warfarin dosing based on clinical status.   JOSÉ Dong  Contact information [phone]

## 2018-07-23 NOTE — PROGRESS NOTES
Hospitalist Progress Note Patient: Micky Hartman MRN: [id number]  CSN: [id number] YOB: 1930  Age: 80 y.o. Sex: female DOA: 7/18/2018 LOS:  LOS: 5 days Assessment/Plan Patient Active Problem List  
Diagnosis Code  Abdominal pain, other specified site R10.9  Atrial fibrillation (HCC) I48.91  
 Constipation K59.00  
 Encephalopathy, unspecified G93.40  Abdominal wall hematoma S30. Durenda Gupta  CAD (coronary artery disease) I25.10  Hypercholesterolemia E78.00  Hypertension I10  
 Hx of myocardial infarction I25.2  Elevated INR R79.1  Atrial fibrillation with slow ventricular response (HCC) I48.91  
 SSS (sick sinus syndrome) ([facility]) I49.5  Acute CVA (cerebrovascular accident) [facility]) I63.9  
  
 
 
 
81 yo female admitted for CVA, A-fib with slow vent response. CRITICAL CARE PLAN Resp - No acute respiratory issues ID - no evidence of infection. CVS - A-fib with slow vent response - cardizem stopped. Wean off dopamine drip. Keep BP>120/60 On coumadin, pharmacy dosing. Echo with hyperdynamic LVF, EF of >70%, no intracardiac shunt. Cardiology following,  
 
Heme/onc - Follow H&H, plts. INR. Renal - Trend BUN, Cr, follow I/O,  Check and replace Mg, K, phos. Endocrine -  Follow [facility] Neuro/ Pain/ Sedation - Acute CVA - continue with aspirin. MRI with multifocal small areas of acute ischemic infarct right MCA territory. CTA head and neck with no hemodynamically significant stenosis. 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 No intervention needed at this time per neurology. Neurology following On coumadin pharmacy dosing. Aspirin stopped by neuro. GI - seen by SLP, on regular diet.  
 
Prophylaxis - DVT: on heparin, stop once INR therapeutic, GI: pepcid. ambulate Disposition : 2-3 days Review of systems General: No fevers or chills. Cardiovascular: No chest pain or pressure. No palpitations. Pulmonary: No shortness of breath. Gastrointestinal: No nausea, vomiting. Physical Exam: 
General: Awake, cooperative, no acute distress   
HEENT: NC, Atraumatic. PERRLA, anicteric sclerae. Lungs: CTA Bilaterally. No Wheezing/Rhonchi/Rales. Heart:  Regular  rhythm,  No murmur, No Rubs, No Gallops Abdomen: Soft, Non distended, Non tender.  +Bowel sounds, Extremities: No c/c/e Psych:   Not anxious or agitated. Neurological Exam:  
Mental Status:  Alert , co-operative , memory intact, speech disarthric. Cranial Nerves:  Intact visual fields. PERRL, EOM's full, no nystagmus, no ptosis. Facial sensation is normal. Facial movement is symmetric. Palate is midline. Normal sternocleidomastoid strength. Tongue is midline. Hearing is intact bilaterally. Motor:  5/5 strength in upper and right lower proximal and distal muscles. Left side weak compare to right Normal bulk and tone. Reflexes:  Deep tendon reflexes 2+/4 and symmetrical.   
Sensory:  Normal and symmetric bilaterally. Gait:  Not tested. Cerebellar:  No cerebellar signs present. Vital signs/Intake and Output: 
Visit Vitals  /76  Pulse 66  Temp 98 °F (36.7 °C)  Resp 14  
 Ht 5' (1.524 m)  Wt 70.2 kg (154 lb 12.2 oz)  SpO2 97%  BMI 30.23 kg/m2 Current Shift:  07/23 0701 - 07/23 1900 In: 802.5 [P.O.:240; I.V.:562.5] Out: 750 [Urine:750] Last three shifts:  07/21 1901 - 07/23 0700 In: 2806.4 [P.O.:240; I.V.:2566.4] Out: 5920 [GRSFR:4289] Labs: Results:  
   
Chemistry Recent Labs  
   07/23/18 
 0430  07/22/18 
 2300  07/22/18 
 1560  07/21/18 
 5563 GLU   --    --   108*  101* NA   --    --   141  141  
K  4.3  3.8  3.6  3.2*  
CL   --    --   106  105 CO2   --    --   25  27 BUN   --    --   5* 8  
CREA   --    --   0.83  0.87 CA   --    --   9.5  9.1 AGAP   --    --   10  9 BUCR   --    --   6*  9*  
  
CBC w/Diff Recent Labs  
   07/23/18 
 0430  07/22/18 
 5430  07/21/18 
 0415 WBC  6.2  5.5  5.5  
RBC  4.47  4.34  4.22  
HGB  13.3  12.8  12.4 HCT  40.6  39.5  38.3 PLT  216  217  227 GRANS  67  61   --   
LYMPH  23  28   --   
EOS  2  2   --   
  
Cardiac Enzymes No results for input(s): CPK, CKND1, ZACARIAS in the last 72 hours. No lab exists for component: Gareth Harper Coagulation Recent Labs  
   07/23/18 
 0430  07/22/18 
 0453 PTP  25.8*  19.1* INR  2.4*  1.7* Lipid Panel Lab Results Component Value Date/Time Cholesterol, total 200 (H) 07/19/2018 04:13 AM  
 HDL Cholesterol 62 (H) 07/19/2018 04:13 AM  
 LDL, calculated 119.4 (H) 07/19/2018 04:13 AM  
 VLDL, calculated 18.6 07/19/2018 04:13 AM  
 Triglyceride 93 07/19/2018 04:13 AM  
 CHOL/HDL Ratio 3.2 07/19/2018 04:13 AM  
  
BNP No results for input(s): BNPP in the last 72 hours. Liver Enzymes No results for input(s): TP, ALB, TBIL, AP, SGOT, GPT in the last 72 hours. No lab exists for component: DBIL Thyroid Studies Lab Results Component Value Date/Time TSH 1.50 07/19/2018 04:13 AM  
    
Procedures/imaging: see electronic medical records for all procedures/Xrays and details which were not copied into this note but were reviewed prior to creation of Plan

## 2018-07-23 NOTE — PROGRESS NOTES
0730  Bedside and Verbal shift change report received from [facility] - RANJAN RN (offgoing nurse). Report included the following information SBAR, Kardex, Intake/Output, MAR and Recent Results.      0800 Assessment complete     1030 MDRs completed with Dr. Inga Girard and critical care team.  Plan of care reviewed and orders received.     1200 Reassessment complete    1600 Reassessment complete    1700 DR. Chau in, dopamine discontinued. Patient up to bedside comode, ambulated in room with HR increase to 80's during activity. 1930 Bedside and Verbal shift change report given to Timothy Alicea RN, (oncoming nurse). Report included the following information SBAR, Kardex, Intake/Output, MAR and Recent Results.

## 2018-07-23 NOTE — PROGRESS NOTES
Problem: Pressure Injury - Risk of  Goal: *Prevention of pressure injury  Document Akira Scale and appropriate interventions in the flowsheet. Outcome: Progressing Towards Goal  Pressure Injury Interventions:  Sensory Interventions: Assess changes in LOC    Moisture Interventions: Apply protective barrier, creams and emollients    Activity Interventions: Pressure redistribution bed/mattress(bed type)    Mobility Interventions: HOB 30 degrees or less    Nutrition Interventions: Document food/fluid/supplement intake    Friction and Shear Interventions: HOB 30 degrees or less               Problem: Falls - Risk of  Goal: *Absence of Falls  Document Steve Fall Risk and appropriate interventions in the flowsheet.    Outcome: Progressing Towards Goal  Fall Risk Interventions:  Mobility Interventions: Patient to call before getting OOB         Medication Interventions: Bed/chair exit alarm    Elimination Interventions: Call light in reach    History of Falls Interventions: Door open when patient unattended

## 2018-07-23 NOTE — PROGRESS NOTES
Bedside and Verbal shift change report received from RUDOLPH Thompson RN (offgoing nurse). Report included the following information SBAR, Kardex, Intake/Output, MAR and Recent Results. 1955 Shift assessment completed, see EMR    2300 Potassium  and magnesium lab drawn. 0000 Reassessment completed, see EMR    0012 Potassium chloride 20 mEq  PO and 1 gm magnesium sulphate IV per protocol. 6402 Patient complained of nausea, Zofran 4mg given as ordered. 1035 [facility]. report given to Any Weathers.  Dopamine infusing at 4 mcg/min

## 2018-07-23 NOTE — PROGRESS NOTES
Chart reviewed, met with pt at bedside in ICU. Pt plans discharge home where she lives with her son. Noted PT/OT recommendation for [facility], left list with pt to review. Pt will need RW for home, states she has not received one through Medicare in over 5yrs, CM able to assist with providing at discharge if still needed. Will follow up with pt when ready to discharge home. Care Management Interventions  PCP Verified by CM: Yes  Transition of Care Consult (CM Consult): Discharge Planning  Physical Therapy Consult: Yes  Occupational Therapy Consult: Yes  Speech Therapy Consult: Yes  Current Support Network: Relative's Home (lives with son, Bonnie Genao)  Confirm Follow Up Transport: Family  Plan discussed with Pt/Family/Caregiver:  Yes

## 2018-07-23 NOTE — PROGRESS NOTES
NUTRITION FOLLOW-UP    RECOMMENDATIONS/PLAN:   -Add Ensure BID  Monitor labs/lytes, PO intakes, skin integrity, wt, fluid status, BM    NUTRITION ASSESSMENT:   Client Update: 80 yrs old Female with acute CVA and a-fib. FOOD/NUTRITION INTAKE   Diet Order:  [facility] soft w/ chopped meats   Food Allergies: NKFA  Average PO Intake:      Patient Vitals for the past 100 hrs:   % Diet Eaten   07/22/18 1300 20 %   07/21/18 1546 0 %   07/21/18 1114 5 %   07/20/18 1344 5 %   07/20/18 0857 100 %   07/19/18 0906 1 %      Pertinent Medications:  [x] Reviewed; intropin, pepcid, warfarin   Electrolyte Replacement Protocol: [x]K [x]Mg []PO4  Insulin:  []SSI  []Pre-meal   []Basal    []Drip  []None  Cultural/Protestant Food Preferences: None Identified    BIOCHEMICAL DATA & MEDICAL TESTS  Pertinent Labs:  [x] Reviewed; ANTHROPOMETRICS  Height: 5' (152.4 cm)       Weight: 70.2 kg (154 lb 12.2 oz)         BMI: 30.2 kg/m^2 obese (30%-39.9% BMI)   Adm Weight: 154 lbs                Weight change: 0lbs  Adjusted Body Weight: 51.6kg     NUTRITION-FOCUSED PHYSICAL ASSESSMENT  Skin: No PU      GI: +BM 7/22/18    NUTRITION PRESCRIPTION  Calories: 1687 kcal/day based on [place] x 1.2 x 1.3  Protein: 56-70 g/day based on 0.8-1.0 g/kg  CHO: 211 g/day based on 50% of total energy  Fluid: 1687 ml/day based on 1 kcal/ml                   NUTRITION DIAGNOSES:   1. Predicted suboptimal energy intake related to inadequate oral intake as evidence by SLP.      NUTRITION INTERVENTIONS:   INTERVENTIONS:                                                                                                                                                                         GOALS:  1. Commercial Beverage:Ensure Enlive TID 1.  Encourage PO intake >50% at most meals by next review 4 days       LEARNING NEEDS (Diet, Supplementation, Food/Nutrient-Drug Interaction):   [x] None Identified   [] Education provided/documented      Identified and patient: [] Declined [] Was not appropriate/indicated        NUTRITION MONITORING /EVALUATION:   Follow PO intake  Monitor wt  Monitor renal labs, electrolytes, fluid status     Previous Recommendations Implemented: yes        Previous Goals Met:  no -      [] Participated in Interdisciplinary Rounds    [x] Interdisciplinary Care Plan Reviewed  DISCHARGE NUTRITION RECOMMENDATIONS ADDRESSED:       [x] To be determined closer to discharge    NUTRITION RISK:           [x] At risk                        [] Not currently at risk        Will follow-up per policy.   Gill Jacksone 1

## 2018-07-23 NOTE — PROGRESS NOTES
Problem: Self Care Deficits Care Plan (Adult)  Goal: *Acute Goals and Plan of Care (Insert Text)  Initial OT STGs (7/19/2018) Within 7 days:    1. Patient will perform self-feeding with setup and trace spillage for increased independence with ADLs. 2. Patient will perform all aspects of toileting with S/mod I for increased independence with ADLs. 3. Patient will perform UB dressing with setup while utilizing nichole-techniques for increased independence with ADLs. 4. Patient will perform LB dressing with setup (except socks) while utilizing nichole-techniques for increased independence with ADLs. 5. Patient will visually locate ADL items in all quadrants with 1 verbal cues in preparation for ADLs. 6. Patient will perform UE exercises with Standby assist for 15 minutes to increase independence with ADLs. 7. Patient will utilize energy conservation techniques with 1 verbal cue(s) for increased independence with ADLs. Outcome: Progressing Towards Goal  Occupational Therapy TREATMENT    Patient: Aidan Lora (20 y.o. female)  Date: 7/23/2018  Diagnosis: Atrial fibrillation with slow ventricular response (HCC) Acute CVA (cerebrovascular accident) [facility])       Precautions: Fall  Chart, occupational therapy assessment, plan of care, and goals were reviewed. ASSESSMENT:  Patient continues to be greatly limited by HR. Pt ranging from 50-90's w/ increase to 108 during hair grooming while standing sinkside. Pt continues to require increased time to complete task and intermittent cues for initiation. (Dr. Peg Santiago reports possible AICD/pacer candidate). Progression toward goals:  []          Improving appropriately and progressing toward goals  [x]          Improving slowly and progressing toward goals  []          Not making progress toward goals and plan of care will be adjusted     PLAN:  Patient continues to benefit from skilled intervention to address the above impairments.   Continue treatment per established plan of care. Discharge Recommendations:  Rehab vs Home Health pending progress & if she ends up needing AICD/pacer  Further Equipment Recommendations for Discharge:  shower chair and rolling walker     SUBJECTIVE:   Patient stated I just went.  (re: toileting)    OBJECTIVE DATA SUMMARY:   Cognitive/Behavioral Status:  Neurologic State: Alert  Orientation Level: Oriented X4  Cognition: Follows commands, Memory loss  Safety/Judgement: Awareness of environment    Functional Mobility and Transfers for ADLs:   Bed Mobility:  Supine to Sit: Supervision  Sit to Supine: Contact guard assistance   Transfers:  Sit to Stand: Contact guard assistance   Toilet Transfer : Contact guard assistance   Bathroom Mobility: Contact guard assistance     Balance:  Sitting: Intact  Standing: Intact; With support  Standing - Static: Good  Standing - Dynamic : Fair    ADL Intervention:  Grooming  Washing Hands: Contact guard assistance  Brushing/Combing Hair: Contact guard assistance;Minimum assistance    Lower Body Dressing Assistance  Underpants: Contact guard assistance    Toileting  Toileting Assistance: Contact guard assistance  Bladder Hygiene: Contact guard assistance  Clothing Management: Minimum assistance  Cues: Tactile cues provided;Verbal cues provided;Visual cues provided;Physical assistance for pants down;Physical assistance for pants up  Adaptive Equipment: Grab bars; Walker    Cognitive Retraining  Orientation Retraining: Reorienting  Problem Solving: Inductive reason; Identifying the task; Identifying the problem;General alternative solution;Deductive reason  Executive Functions: Executing cognitive plans  Organizing/Sequencing: Breaking task down;Prioritizing  Attention to Task: Distractibility; Single task  Maintains Attention For (Time): 30 seconds  Following Commands: Awareness of environment; Follows one step commands/directions  Safety/Judgement: Awareness of environment  Cues: Tactile cues provided;Verbal cues provided;Visual cues provided    Pain:  Pre-treatment: 0/10  Post-treatment: 0/10    Activity Tolerance:    Patient able to stand ~1 minute(s). Patient able to complete ADLs with frequent rest breaks. Patient limited by cognition/HR/strength/balance. Patient unsteady     Please refer to the flowsheet for vital signs taken during this treatment.   After treatment:   []  Patient left in no apparent distress sitting up in chair  [x]  Patient left in no apparent distress in bed  [x]  Call bell left within reach  [x]  Nursing notified/[place]  []  Caregiver present  []  Bed alarm activated    Thank you for this referral.  Tierney Montaño, OTR/L  Time Calculation: 18 mins

## 2018-07-24 ENCOUNTER — APPOINTMENT (OUTPATIENT)
Dept: GENERAL RADIOLOGY | Age: 83
DRG: 065 | End: 2018-07-24
Attending: INTERNAL MEDICINE
Payer: MEDICARE

## 2018-07-24 LAB
ANION GAP SERPL CALC-SCNC: 4 MMOL/L (ref 3–18)
ARTERIAL PATENCY WRIST A: YES
ARTERIAL PATENCY WRIST A: YES
BASE EXCESS BLD CALC-SCNC: 0 MMOL/L
BASE EXCESS BLD CALC-SCNC: 3 MMOL/L
BASOPHILS # BLD: 0 K/UL (ref 0–0.1)
BASOPHILS NFR BLD: 0 % (ref 0–2)
BDY SITE: ABNORMAL
BDY SITE: ABNORMAL
BODY TEMPERATURE: 98.6
BODY TEMPERATURE: 98.6
BUN SERPL-MCNC: 8 MG/DL (ref 7–18)
BUN/CREAT SERPL: 9 (ref 12–20)
CALCIUM SERPL-MCNC: 9.3 MG/DL (ref 8.5–10.1)
CHLORIDE SERPL-SCNC: 105 MMOL/L (ref 100–108)
CO2 SERPL-SCNC: 28 MMOL/L (ref 21–32)
CREAT SERPL-MCNC: 0.87 MG/DL (ref 0.6–1.3)
DIFFERENTIAL METHOD BLD: NORMAL
EOSINOPHIL # BLD: 0.2 K/UL (ref 0–0.4)
EOSINOPHIL NFR BLD: 4 % (ref 0–5)
ERYTHROCYTE [DISTWIDTH] IN BLOOD BY AUTOMATED COUNT: 13.3 % (ref 11.6–14.5)
GAS FLOW.O2 O2 DELIVERY SYS: ABNORMAL L/MIN
GAS FLOW.O2 O2 DELIVERY SYS: ABNORMAL L/MIN
GAS FLOW.O2 SETTING OXYMISER: 15 L/M
GLUCOSE SERPL-MCNC: 86 MG/DL (ref 74–99)
HCO3 BLD-SCNC: 24.7 MMOL/L (ref 22–26)
HCO3 BLD-SCNC: 27.2 MMOL/L (ref 22–26)
HCT VFR BLD AUTO: 39.6 % (ref 35–45)
HGB BLD-MCNC: 12.7 G/DL (ref 12–16)
INR PPP: 2.7 (ref 0.8–1.2)
LYMPHOCYTES # BLD: 1.5 K/UL (ref 0.9–3.6)
LYMPHOCYTES NFR BLD: 28 % (ref 21–52)
MAGNESIUM SERPL-MCNC: 1.9 MG/DL (ref 1.6–2.6)
MCH RBC QN AUTO: 29.3 PG (ref 24–34)
MCHC RBC AUTO-ENTMCNC: 32.1 G/DL (ref 31–37)
MCV RBC AUTO: 91.5 FL (ref 74–97)
MONOCYTES # BLD: 0.5 K/UL (ref 0.05–1.2)
MONOCYTES NFR BLD: 9 % (ref 3–10)
NEUTS SEG # BLD: 3.1 K/UL (ref 1.8–8)
NEUTS SEG NFR BLD: 59 % (ref 40–73)
O2/TOTAL GAS SETTING VFR VENT: 100 %
O2/TOTAL GAS SETTING VFR VENT: 21 %
PCO2 BLD: 40 MMHG (ref 35–45)
PCO2 BLD: 41.1 MMHG (ref 35–45)
PH BLD: 7.4 [PH] (ref 7.35–7.45)
PH BLD: 7.43 [PH] (ref 7.35–7.45)
PLATELET # BLD AUTO: 200 K/UL (ref 135–420)
PMV BLD AUTO: 9.8 FL (ref 9.2–11.8)
PO2 BLD: 603 MMHG (ref 80–100)
PO2 BLD: 78 MMHG (ref 80–100)
POTASSIUM SERPL-SCNC: 4 MMOL/L (ref 3.5–5.5)
PROTHROMBIN TIME: 28.2 SEC (ref 11.5–15.2)
RBC # BLD AUTO: 4.33 M/UL (ref 4.2–5.3)
SAO2 % BLD: 100 % (ref 92–97)
SAO2 % BLD: 95 % (ref 92–97)
SERVICE CMNT-IMP: ABNORMAL
SERVICE CMNT-IMP: ABNORMAL
SODIUM SERPL-SCNC: 137 MMOL/L (ref 136–145)
SPECIMEN TYPE: ABNORMAL
SPECIMEN TYPE: ABNORMAL
TOTAL RESP. RATE, ITRR: 12
TOTAL RESP. RATE, ITRR: 12
WBC # BLD AUTO: 5.2 K/UL (ref 4.6–13.2)

## 2018-07-24 PROCEDURE — 83735 ASSAY OF MAGNESIUM: CPT | Performed by: INTERNAL MEDICINE

## 2018-07-24 PROCEDURE — 71045 X-RAY EXAM CHEST 1 VIEW: CPT

## 2018-07-24 PROCEDURE — 74011250636 HC RX REV CODE- 250/636: Performed by: INTERNAL MEDICINE

## 2018-07-24 PROCEDURE — 80048 BASIC METABOLIC PNL TOTAL CA: CPT | Performed by: PSYCHIATRY & NEUROLOGY

## 2018-07-24 PROCEDURE — 97530 THERAPEUTIC ACTIVITIES: CPT

## 2018-07-24 PROCEDURE — 97116 GAIT TRAINING THERAPY: CPT

## 2018-07-24 PROCEDURE — 65610000006 HC RM INTENSIVE CARE

## 2018-07-24 PROCEDURE — 74011250637 HC RX REV CODE- 250/637: Performed by: INTERNAL MEDICINE

## 2018-07-24 PROCEDURE — 36600 WITHDRAWAL OF ARTERIAL BLOOD: CPT

## 2018-07-24 PROCEDURE — 85610 PROTHROMBIN TIME: CPT | Performed by: PSYCHIATRY & NEUROLOGY

## 2018-07-24 PROCEDURE — 82803 BLOOD GASES ANY COMBINATION: CPT

## 2018-07-24 PROCEDURE — 85025 COMPLETE CBC W/AUTO DIFF WBC: CPT | Performed by: PSYCHIATRY & NEUROLOGY

## 2018-07-24 PROCEDURE — 74011250637 HC RX REV CODE- 250/637: Performed by: PSYCHIATRY & NEUROLOGY

## 2018-07-24 RX ORDER — POTASSIUM CHLORIDE 750 MG/1
10 TABLET, EXTENDED RELEASE ORAL ONCE
Status: COMPLETED | OUTPATIENT
Start: 2018-07-24 | End: 2018-07-24

## 2018-07-24 RX ORDER — WARFARIN 1 MG/1
1.5 TABLET ORAL ONCE
Status: COMPLETED | OUTPATIENT
Start: 2018-07-24 | End: 2018-07-24

## 2018-07-24 RX ADMIN — SODIUM CHLORIDE 75 ML/HR: 900 INJECTION, SOLUTION INTRAVENOUS at 03:53

## 2018-07-24 RX ADMIN — Medication 10 ML: at 22:00

## 2018-07-24 RX ADMIN — Medication 10 ML: at 19:43

## 2018-07-24 RX ADMIN — Medication 10 ML: at 08:39

## 2018-07-24 RX ADMIN — POTASSIUM CHLORIDE 10 MEQ: 10 TABLET, EXTENDED RELEASE ORAL at 06:34

## 2018-07-24 RX ADMIN — ATORVASTATIN CALCIUM 80 MG: 20 TABLET, FILM COATED ORAL at 08:35

## 2018-07-24 RX ADMIN — WARFARIN SODIUM 1 MG: 1 TABLET ORAL at 19:42

## 2018-07-24 RX ADMIN — FAMOTIDINE 20 MG: 20 TABLET ORAL at 08:36

## 2018-07-24 NOTE — PROGRESS NOTES
Pharmacy Dosing Services: Warfarin    Consult for Warfarin Dosing by Pharmacy by Dr. Chrissy Allred provided for this 80 y.o.  female , for indication of Stroke. Day of Therapy 5  Dose to achieve an INR goal of 2-3    Order entered for Warfarin 1.5 mg to be given today at 18:00. Significant drug interactions: none  LABS    PT/INR Lab Results   Component Value Date/Time    INR 2.7 (H) 07/24/2018 04:30 AM      Platelets Lab Results   Component Value Date/Time    PLATELET 038 24/71/8650 04:30 AM      H/H     Lab Results   Component Value Date/Time    HGB 12.7 07/24/2018 04:30 AM        Warfarin Administrations (last 168 hours)       Date/Time Action Medication Dose      07/23/18 1832 Given    warfarin (COUMADIN) tablet 2 mg 2 mg    07/22/18 1859 Given    warfarin (COUMADIN) tablet 3 mg 3 mg    07/21/18 1709 Given    warfarin (COUMADIN) tablet 7.5 mg 7.5 mg    07/20/18 1743 Given    warfarin (COUMADIN) tablet 5 mg 5 mg    07/19/18 1849 Given    warfarin (COUMADIN) tablet 5 mg 5 mg          Pharmacy to follow daily and will provide subsequent Warfarin dosing based on clinical status.   JOSÉ Walsh  Contact information:  [phone]

## 2018-07-24 NOTE — PROGRESS NOTES
0715  Report received from Aguilar Domínguez RN, at pt bedside using SBAR format. All questions answered and all clinicals reviewed. Pt is alert and oriented, in bed, without complaints. 1030  Multidisciplinary rounds completed with the clinical care team.  Plan of care reviewed and orders received. 1200  Pt ambulated in room. HR 62-97. Pt without SOB and asymptomatic. Up to bedside commode. Eating lunch in room. 1600  Pt eating lunch in chair in room. Pt has been with VSS during the day. Pt alert and oriented x 4 and without any pain or complaints. 1930  Report given to Aguilar Domínguez RN, using SBAR format at bedside.

## 2018-07-24 NOTE — PROGRESS NOTES
[address] [facility] [phone] (COPE)    Patient Name: Abelardo Johnson  YOB: 1930    Reason for Consult: Goals of care  Requesting Provider: Dr. Connie Zapata   Primary Care Physician: Steven Ross MD     SUMMARY:   Abelardo Johnson is a 80 y.o. with a past history of arthritis, Atrial fibrillation, CAD, old MI, who was admitted on 7/18/2018 from Home with a diagnosis of CVA with left sided deficits and dysarthria,  Atrial fibrillation,  Chronic anticoagulation with subtherapeutic INR,  HTN, Dyslipidemia, CAD  Current medical issues leading to Palliative Medicine involvement include: CVA, possible pacemaker. PM team members, Demond THOMPSON and myself visited Mrs. Mauri Kirkpatrick. She was very pleasant and denied any complaints or needs. Palliative medicine introduced, Mrs. Mauri Kirkpatrick stated she was familiar with palliative care and stated she would be happy to speak with us. We discussed goals of care. Mrs. Mauri Kirkpatrick stated she planned to return home with her son at discharge. \"They take excellent care of me\". She told us she had completed an Advance Directive. She stated her daughter, Connie Zapata, but was not sure of Silke's phone number. She then said that her daughter Miguelito Givens was her secondary and Soren Ivy could make any decisions if needed. She stated that she thought that her PCP, Dr. Joss Callaway had this document on file. We told her we could call and request a copy. She agreed as she was not sure when any of her children would be able to provide a copy. WE discussed code status and she initially said she would want DNR but then stated that she wanted her heart rhythm under control so would like to remain Full Code. She stated she may need a pacemaker to control her heart and may reconsider code status if a pacemaker did not correct her heart \"problem\". We agreed to readdress code status later.    We called Dr. Kelton Read office and they stated they had no AMD on file but did have a DDNR, however, when they FAXED that document it was not signed by either a physician or the patient. Mrs. Deysi Oconnell stated she thought she had signed the document but that she would want to remain Full Code for now so would not want to sign a DDNR. PM team will contact patient's family and request a copy of her AMD. Thank you for including palliative medicine team in the care of this patient. We will readdress code status after a determination of need for pacemaker established. ADDENDUM; Call placed to patient's son, Osman Meek ([phone]) to obtain copy of AMD. He does not have a copy but stated he would attempt to contact his sister to see if she had a copy. Contact information provided to Deandra Mott. RECOMMENDATIONS:   1. Full code per patient request.  2. Patient aware of possibility of pacemaker placement. 3. Continue current care. GOALS OF CARE / TREATMENT PREFERENCES:     GOALS OF CARE:  Patient/Health Care Proxy Stated Goals: Other (comment) (Pacemaker if needed.)    TREATMENT PREFERENCES:   Code Status: Full Code    Advance Care Planning:  Advance Care Planning 7/24/2018   Patient's Healthcare Decision Maker is: Verbal statement (Legal Next of Kin remains as decision maker)   Primary Decision Maker Name Payal Soto   Primary Decision Maker Phone Number No phone number available at this time   Primary Decision Maker Relationship to Patient Adult child   Secondary Decision Maker Name Nkechi Azevedo Phone Number [phone]   Secondary Decision Maker Relationship to Patient Adult child   Confirm Advance Directive -       Medical Interventions: Full interventions     Other Instructions: Would not want to be on long term life support but wants to remain Full Code at present due to possibility of pacemaker placement.     Artificially Administered Nutrition: No feeding tube     CLINICAL ASSESSMENT:   Palliative Performance Scale (PPS):  PPS: 70    Modified ESAS Completed by: provider   Fatigue: 0 Drowsiness: 0   Depression: 0 Pain: 0   Anxiety: 0 Nausea: 0   Anorexia: 0 Dyspnea: 0     Constipation: No   Other Problem (Comment): 0 Stool Occurrence(s): 1     Clinical Pain Assessment (nonverbal scale for severity on nonverbal patients):   Clinical Pain Assessment  Severity: 0           PSYCHOSOCIAL/SPIRITUAL ASSESSMENT:   Palliative IDT has assessed this patient for cultural preferences / practices and a referral made as appropriate to needs (Cultural Services, Patient Advocacy, Ethics, etc.)      8 Children  Lives with her son, Elaine Zee and his daughter  Held several jobs to support her family    Any spiritual / [employer]eterian concerns:  [] Yes /  [x] No    Caregiver Burnout:  [] Yes /  [x] No /  [] No Caregiver Present      Anticipatory grief assessment:   [x] Normal  / [] Maladaptive

## 2018-07-24 NOTE — PROGRESS NOTES
Hospitalist Progress Note Patient: Efren Shane MRN: [id number]  CSN: [id number] YOB: 1930  Age: 80 y.o. Sex: female DOA: 7/18/2018 LOS:  LOS: 6 days Assessment/Plan Patient Active Problem List  
Diagnosis Code  Abdominal pain, other specified site R10.9  Atrial fibrillation (HCC) I48.91  
 Constipation K59.00  
 Encephalopathy, unspecified G93.40  Abdominal wall hematoma S30. Joaquin Hoes  CAD (coronary artery disease) I25.10  Hypercholesterolemia E78.00  Hypertension I10  
 Hx of myocardial infarction I25.2  Elevated INR R79.1  Atrial fibrillation with slow ventricular response (HCC) I48.91  
 SSS (sick sinus syndrome) ([facility]) I49.5  Acute CVA (cerebrovascular accident) [facility]) I63.9  
  
 
 
 
79 yo female admitted for CVA, A-fib with slow vent response. CRITICAL CARE PLAN Resp - No acute respiratory issues ID - no evidence of infection. CVS - A-fib with slow vent response - cardizem stopped. off dopamine drip. HR stable, On coumadin, pharmacy dosing. Echo with hyperdynamic LVF, EF of >70%, no intracardiac shunt. Cardiology following,  
 
Heme/onc - Follow H&H, plts. INR. Renal - Trend BUN, Cr, follow I/O,  Check and replace Mg, K, phos. Endocrine -  Follow FSG Neuro/ Pain/ Sedation - Acute CVA - continue with aspirin. MRI with multifocal small areas of acute ischemic infarct right MCA territory. CTA head and neck with no hemodynamically significant stenosis. 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 No intervention needed at this time per neurology. Neurology following On coumadin pharmacy dosing. Aspirin stopped by neuro. GI - seen by SLP, on regular diet. Prophylaxis - DVT: coumadin, GI: pepcid. ambulate Transfer to floor Disposition : 2-3 days Review of systems General: No fevers or chills. Cardiovascular: No chest pain or pressure. No palpitations. Pulmonary: No shortness of breath. Gastrointestinal: No nausea, vomiting. Physical Exam: 
General: Awake, cooperative, no acute distress   
HEENT: NC, Atraumatic. PERRLA, anicteric sclerae. Lungs: CTA Bilaterally. No Wheezing/Rhonchi/Rales. Heart:  Regular  rhythm,  No murmur, No Rubs, No Gallops Abdomen: Soft, Non distended, Non tender.  +Bowel sounds, Extremities: No c/c/e Psych:   Not anxious or agitated. Neurological Exam:  
Mental Status:  Alert , co-operative , memory intact, speech disarthric. Cranial Nerves:  Intact visual fields. PERRL, EOM's full, no nystagmus, no ptosis. Facial sensation is normal. Facial movement is symmetric. Palate is midline. Normal sternocleidomastoid strength. Tongue is midline. Hearing is intact bilaterally. Motor:  5/5 strength in upper and right lower proximal and distal muscles. Left side weak compare to right Normal bulk and tone. Reflexes:  Deep tendon reflexes 2+/4 and symmetrical.   
Sensory:  Normal and symmetric bilaterally. Gait:  Not tested. Cerebellar:  No cerebellar signs present. Vital signs/Intake and Output: 
Visit Vitals  /81  Pulse 60  Temp 97.4 °F (36.3 °C)  Resp 13  Ht 5' (1.524 m)  Wt 70.2 kg (154 lb 12.2 oz)  SpO2 96%  BMI 30.23 kg/m2 Current Shift:  07/24 0701 - 07/24 1900 In: 200 [P.O.:200] Out: 1025 [Urine:1025] Last three shifts:  07/22 1901 - 07/24 0700 In: 3136.8 [P.O.:240; I.V.:2896.8] Out: 4920 [FZFKP:1325] Labs: Results:  
   
Chemistry Recent Labs  
   07/24/18 
 0430  07/23/18 
 0430  07/22/18 
 2300  07/22/18 
 4375 GLU  86   --    --   108* NA  137   --    --   141  
K  4.0  4.3  3.8  3.6 CL  105   --    --   106 CO2  28   --    --   25 BUN  8   --    --   5* CREA  0.87   --    -- 0. 83  
CA  9.3   --    --   9.5 AGAP  4   --    --   10  
BUCR  9*   --    --   6* CBC w/Diff Recent Labs  
   07/24/18 
 0430  07/23/18 
 0430  07/22/18 
 9990 WBC  5.2  6.2  5.5  
RBC  4.33  4.47  4.34  
HGB  12.7  13.3  12.8 HCT  39.6  40.6  39.5 PLT  200  216  217 GRANS  59  67  61 LYMPH  28  23  28 EOS  4  2  2 Cardiac Enzymes No results for input(s): CPK, CKND1, ZACARIAS in the last 72 hours. No lab exists for component: Debra Estrella Coagulation Recent Labs  
   07/24/18 
 0430 07/23/18 
 0430 PTP  28.2*  25.8* INR  2.7*  2.4* Lipid Panel Lab Results Component Value Date/Time Cholesterol, total 200 (H) 07/19/2018 04:13 AM  
 HDL Cholesterol 62 (H) 07/19/2018 04:13 AM  
 LDL, calculated 119.4 (H) 07/19/2018 04:13 AM  
 VLDL, calculated 18.6 07/19/2018 04:13 AM  
 Triglyceride 93 07/19/2018 04:13 AM  
 CHOL/HDL Ratio 3.2 07/19/2018 04:13 AM  
  
BNP No results for input(s): BNPP in the last 72 hours. Liver Enzymes No results for input(s): TP, ALB, TBIL, AP, SGOT, GPT in the last 72 hours. No lab exists for component: DBIL Thyroid Studies Lab Results Component Value Date/Time TSH 1.50 07/19/2018 04:13 AM  
    
Procedures/imaging: see electronic medical records for all procedures/Xrays and details which were not copied into this note but were reviewed prior to creation of Plan

## 2018-07-24 NOTE — PROGRESS NOTES
Cardiology Progress Note        Patient: David Llanos        Sex: female          DOA: 7/18/2018  YOB: 1930      Age:  80 y.o.        LOS:  LOS: 6 days   Assessment/Plan     Principal Problem:    Acute CVA (cerebrovascular accident) ([facility] (7/18/2018)    Active Problems:    CAD (coronary artery disease) (1/5/2018)      Hypercholesterolemia (1/5/2018)      Atrial fibrillation with slow ventricular response ([facility] (7/18/2018)      SSS (sick sinus syndrome) ([facility] (7/18/2018)        Plan:  Ambulating and doing well  Hr 100/min Afib on ambulation  Continue with current medications. Subjective:    cc:  Bradycardia  afib  Stroke          REVIEW OF SYSTEMS:     General: No fevers or chills. Cardiovascular: No chest pain or pressure. No palpitations. No ankle swelling  Pulmonary: No SOB, orthopnea, PND  Gastrointestinal: No nausea, vomiting or diarrhea      Objective:      Visit Vitals    /77    Pulse 74    Temp 97.4 °F (36.3 °C)    Resp 14    Ht 5' (1.524 m)    Wt 70.2 kg (154 lb 12.2 oz)    SpO2 97%    BMI 30.23 kg/m2     Body mass index is 30.23 kg/(m^2). Physical Exam:  General Appearance: Comfortable, not using accessory muscles of respiration. NECK: No JVD, no thyroidomeglay. LUNGS: Clear bilaterally. HEART: S1 variable +S2 audible,    ABD: Non-tender, BS Audible    EXT: No edema, and no cysnosis. VASCULAR EXAM: Pulses are intact. PSYCHIATRIC EXAM: Mood is appropriate. MUSCULOSKELETAL: Grossly no joint deformity.     Medication:  Current Facility-Administered Medications   Medication Dose Route Frequency    ondansetron (ZOFRAN) injection 4 mg  4 mg IntraVENous Q6H PRN    ELECTROLYTE REPLACEMENT PROTOCOL-POTASSIUM  1 Each Other PRN    ELECTROLYTE REPLACEMENT PROTOCOL-MAGNESIUM  1 Each Other PRN    atorvastatin (LIPITOR) tablet 80 mg  80 mg Oral DAILY    0.9% sodium chloride infusion  75 mL/hr IntraVENous CONTINUOUS    famotidine (PEPCID) tablet 20 mg  20 mg Oral DAILY    Warfarin - Pharmacy to dose    Other Rx Dosing/Monitoring    sodium chloride (NS) flush 5-10 mL  5-10 mL IntraVENous Q8H    sodium chloride (NS) flush 5-10 mL  5-10 mL IntraVENous PRN               Lab/Data Reviewed:  Procedures/imaging: see electronic medical records for all procedures/Xrays   and details which were not copied into this note but were reviewed prior to creation of Plan       All lab results for the last 24 hours reviewed.      Recent Labs      07/24/18 0430 07/23/18 0430 07/22/18 0722   WBC  5.2  6.2  5.5   HGB  12.7  13.3  12.8   HCT  39.6  40.6  39.5   PLT  200  216  217     Recent Labs      07/24/18   0430 07/23/18 0430 07/22/18   2300  07/22/18 0722   NA  137   --    --   141   K  4.0  4.3  3.8  3.6   CL  105   --    --   106   CO2  28   --    --   25   GLU  86   --    --   108*   BUN  8   --    --   5*   CREA  0.87   --    --   0.83   CA  9.3   --    --   9.5       Signed By: Bennie Camacho MD     July 24, 2018

## 2018-07-24 NOTE — PROGRESS NOTES
Problem: Mobility Impaired (Adult and Pediatric)  Goal: *Acute Goals and Plan of Care (Insert Text)  Physical Therapy Goals  Initiated 7/19/2018 and to be accomplished within 3-7 day(s)  1. Patient will move from supine to sit and sit to supine  in bed with supervision/set-up. 2.  Patient will transfer from bed to chair and chair to bed with supervision/set-up using the least restrictive device. 3.  Patient will perform sit to stand with supervision/set-up. 4.  Patient will ambulate with supervision/set-up for 150 feet with the least restrictive device. 5.  Patient will ascend/descend 6 stairs with B handrail(s) with minimal assistance/contact guard assist.   Outcome: Progressing Towards Goal  physical Therapy TREATMENT    Patient: Eufemia Mckoy (89 y.o. female)  Date: 7/24/2018  Diagnosis: Atrial fibrillation with slow ventricular response (HCC) Acute CVA (cerebrovascular accident) [facility])       Precautions: Fall   Chart, physical therapy assessment, plan of care and goals were reviewed. ASSESSMENT:  Pt appears lethargic and less enthused to participate, than previous session with this writer. Pt's ambulation quality and distance were decreased on this session. HR range of 55-97 throughout treatment. Pt report feeling weak and tired, requesting to return to bed post session. RN informed of session's outcome. Will cont with POC. Progression toward goals:  []      Improving appropriately and progressing toward goals  [x]      Improving slowly and progressing toward goals  []      Not making progress toward goals and plan of care will be adjusted     PLAN:  Patient continues to benefit from skilled intervention to address the above impairments. Continue treatment per established plan of care. Discharge Recommendations:  Home Health  Further Equipment Recommendations for Discharge:  bedside commode and rolling walker     SUBJECTIVE:   Patient stated I'm not sure how I'm doing.     OBJECTIVE DATA SUMMARY:   Critical Behavior:  Neurologic State: Alert  Orientation Level: Disoriented X4  Cognition: Follows commands  Safety/Judgement: Awareness of environment  Functional Mobility Training:  Bed Mobility:  Rolling: Stand-by assistance  Supine to Sit: Stand-by assistance  Sit to Supine: Stand-by assistance  Transfers:  Sit to Stand: Contact guard assistance  Stand to Sit: Contact guard assistance  Balance:  Sitting: Intact  Standing: Intact; With support  Standing - Static: Good  Standing - Dynamic : Fair  Ambulation/Gait Training:  Distance (ft): 170 Feet (ft)  Assistive Device: Gait belt;Walker, rolling  Ambulation - Level of Assistance: Contact guard assistance  Gait Abnormalities: Decreased step clearance  Speed/Tangela: Slow  Step Length: Right shortened;Left shortened  Therapeutic Exercises:   AP. AC and LAQ x10   Pain:  Pain Scale 1: Numeric (0 - 10)  Pain Intensity 1: 3  Pain Location 1: Head  Pain Orientation 1: Lower; Posterior  Pain Intervention(s) 1: Refused  Activity Tolerance:   Fair   Please refer to the flowsheet for vital signs taken during this treatment.   After treatment:   [] Patient left in no apparent distress sitting up in chair  [x] Patient left in no apparent distress in bed  [x] Call bell left within reach  [x] Nursing notified  [] Caregiver present  [] Bed alarm activated      Liliana Colbert PTA   Time Calculation: 24 mins

## 2018-07-24 NOTE — PROGRESS NOTES
Problem: Self Care Deficits Care Plan (Adult)  Goal: *Acute Goals and Plan of Care (Insert Text)  Initial OT STGs (7/19/2018) Within 7 days:    1. Patient will perform self-feeding with setup and trace spillage for increased independence with ADLs. 2. Patient will perform all aspects of toileting with S/mod I for increased independence with ADLs. 3. Patient will perform UB dressing with setup while utilizing nichole-techniques for increased independence with ADLs. 4. Patient will perform LB dressing with setup (except socks) while utilizing nichole-techniques for increased independence with ADLs. 5. Patient will visually locate ADL items in all quadrants with 1 verbal cues in preparation for ADLs. 6. Patient will perform UE exercises with Standby assist for 15 minutes to increase independence with ADLs. 7. Patient will utilize energy conservation techniques with 1 verbal cue(s) for increased independence with ADLs. Occupational Therapy Treatment Attempt    Chart reviewed. Attempted Occupational Therapy Treatment, however, patient unable to be seen due to:  []  Nausea/vomiting  []  Eating  []  Pain  []  Patient too lethargic  []  Off Unit for testing/procedure  []  Dialysis treatment in progress  []  Telemetry Results  [x]  Other:  First attempt, pt w/ RT (1702) receiving breathing tx; 2nd attempt, pt w/ Palliative Care    Will f/u later as patient's schedule allows.    Thank you for this referral.  Tierney Montaño, OTR/L

## 2018-07-24 NOTE — PROGRESS NOTES
[facility]  Pulmonary, Critical Care, and Sleep Medicine    Pulm/CC Note    Name: Abelardo Sun MRN: [id number]   : 1930 Hospital: [facility] [place]   Date: 2018  Room: [facility]     Subjective:   Patient is a 80 y. o. female with hx of HTN, hyperlipidemia, CAD/MI and atrial fibrillation (on aspirin only, not taking coumadin), who presented with left sided upper and lower extremity weakness, slurred speech and left facial droop on 18. The patient was evaluated by tele neurology, onset was not clear and not given tPA. CT head nil acute. Patient was bradycardic with slow Afib and hypotensive - started on dopamine which is at 5 mcg/kg/min.    18  Patient awake, alert, follows all commands  Came off of Dopamine and remained hemodynamically stable  During sleep overnight, HR ranged in 40's, asymptomatic  Remains in slow rate Afib, asymptomatic  No cough or CP or SOB. For reasons unclear ABG not done, spoke with staff  No worsening left sided weakness  Afebrile.  Tolerating PO diet    Review of Systems   General ROS: negative for  - fever, chills, weight loss, fatigue and malaise  Cardiovascular ROS: negative for - chest pain, edema, murmur, orthopnea, palpitations or PND  Gastrointestinal ROS: no abdominal pain, change in bowel habits, or black or bloody stools  Dermatological ROS: negative for - pruritus, rash or skin lesion changes       Past Medical History:   Diagnosis Date    Arthritis     Atrial fibrillation ([facility]     CAD (coronary artery disease)     Edema     Gastrointestinal disorder     High cholesterol     Hypertension     Hypoglycemia     Hypoglycemia     IBS (irritable bowel syndrome)     MI, old       Past Surgical History:   Procedure Laterality Date    HX CHOLECYSTECTOMY      HX KNEE REPLACEMENT      HX ORTHOPAEDIC      Left TKR      Allergies   Allergen Reactions    Amoxicillin Rash      Social History   Substance Use Topics    Smoking status: Never Smoker    Smokeless tobacco: Not on file    Alcohol use 0.5 oz/week     1 Glasses of wine per week      Comment: every few weeks        Current Facility-Administered Medications   Medication Dose Route Frequency    atorvastatin (LIPITOR) tablet 80 mg  80 mg Oral DAILY    0.9% sodium chloride infusion  75 mL/hr IntraVENous CONTINUOUS    famotidine (PEPCID) tablet 20 mg  20 mg Oral DAILY    Warfarin - Pharmacy to dose    Other Rx Dosing/Monitoring    sodium chloride (NS) flush 5-10 mL  5-10 mL IntraVENous Q8H       Latest lactic acid:   Lactic acid   Date Value Ref Range Status   2012 1.3 0.4 - 2.0 MMOL/L Final         Objective:   Vital Signs:    Visit Vitals    /77    Pulse 74    Temp 97.5 °F (36.4 °C)    Resp 14    Ht 5' (1.524 m)    Wt 70.2 kg (154 lb 12.2 oz)    SpO2 97%    BMI 30.23 kg/m2       O2 Device: Room air   Temp (24hrs), Av.9 °F (36.6 °C), Min:97.5 °F (36.4 °C), Max:98.4 °F (36.9 °C)       Intake/Output:   Last shift:      701 - 1900  In: -   Out: 350 [Urine:350]  Last 3 shifts: 1901 -  07  In: 3136.8 [P.O.:240;  I.V.:2896.8]  Out: 4920 [Urine:4920]    Intake/Output Summary (Last 24 hours) at 18 0930  Last data filed at 18 0739   Gross per 24 hour   Intake          1800.86 ml   Output             2500 ml   Net          -699.14 ml           Physical Exam:   Gene: comfortable; on room air; acyanotic; awake, alert, following all commands  Neck: No adenopathy or thyroid swelling  CVS: S1S2 no murmurs; JVD not elevated  RS: Good air entry bilaterally, no wheezes or crackles  Abd: soft, non tender, no hepatosplenomegaly, no abd distension  Neuro: awake, alert, no facial droop; no left sided neglect; left UE and LE strength 4+/5  Extrm: no leg edema or swelling or clubbing  Skin: no rash  Lymphatic: no cervical or supraclavicular adenopathy      Data review:     Recent Results (from the past 12 hour(s))   PROTHROMBIN TIME + INR    Collection Time: 07/24/18  4:30 AM   Result Value Ref Range    Prothrombin time 28.2 (H) 11.5 - 15.2 sec    INR 2.7 (H) 0.8 - 1.2     METABOLIC PANEL, BASIC    Collection Time: 07/24/18  4:30 AM   Result Value Ref Range    Sodium 137 136 - 145 mmol/L    Potassium 4.0 3.5 - 5.5 mmol/L    Chloride 105 100 - 108 mmol/L    CO2 28 21 - 32 mmol/L    Anion gap 4 3.0 - 18 mmol/L    Glucose 86 74 - 99 mg/dL    BUN 8 7.0 - 18 MG/DL    Creatinine 0.87 0.6 - 1.3 MG/DL    BUN/Creatinine ratio 9 (L) 12 - 20      GFR est AA >60 >60 ml/min/1.73m2    GFR est non-AA >60 >60 ml/min/1.73m2    Calcium 9.3 8.5 - 10.1 MG/DL   CBC WITH AUTOMATED DIFF    Collection Time: 07/24/18  4:30 AM   Result Value Ref Range    WBC 5.2 4.6 - 13.2 K/uL    RBC 4.33 4.20 - 5.30 M/uL    HGB 12.7 12.0 - 16.0 g/dL    HCT 39.6 35.0 - 45.0 %    MCV 91.5 74.0 - 97.0 FL    MCH 29.3 24.0 - 34.0 PG    MCHC 32.1 31.0 - 37.0 g/dL    RDW 13.3 11.6 - 14.5 %    PLATELET 823 450 - 333 K/uL    MPV 9.8 9.2 - 11.8 FL    NEUTROPHILS 59 40 - 73 %    LYMPHOCYTES 28 21 - 52 %    MONOCYTES 9 3 - 10 %    EOSINOPHILS 4 0 - 5 %    BASOPHILS 0 0 - 2 %    ABS. NEUTROPHILS 3.1 1.8 - 8.0 K/UL    ABS. LYMPHOCYTES 1.5 0.9 - 3.6 K/UL    ABS. MONOCYTES 0.5 0.05 - 1.2 K/UL    ABS. EOSINOPHILS 0.2 0.0 - 0.4 K/UL    ABS. BASOPHILS 0.0 0.0 - 0.1 K/UL    DF AUTOMATED     MAGNESIUM    Collection Time: 07/24/18  4:30 AM   Result Value Ref Range    Magnesium 1.9 1.6 - 2.6 mg/dL             No results for input(s): FIO2I, IFO2, HCO3I, IHCO3, HCOPOC, PCO2I, PCOPOC, IPHI, PHI, PHPOC, PO2I, PO2POC in the last 72 hours. No lab exists for component: IPOC2    All Micro Results     None          ECHO   · Saline contrast was given to evaluate for intracardiac shunt. There is no evidence of intracrdiac large shunt  · Late bubbles suggest an extra cardiac shunt  · Left ventricular hyperdynamic systolic function. Estimated left ventricular ejection fraction is >70%.  Please note that echo was performed when pt was on dopamine infusion 5 microgram/kg/min. · Left atrial cavity size is moderately dilated. · Right atrial cavity size is moderately dilated. · Aortic valve is trileaflet. Aortic valve sclerosis. · Mitral valve non-specific thickening. Mild to moderate mitral valve regurgitation. · Moderate tricuspid valve regurgitation is present. Pulmonary arterial systolic pressure is 42 mmHg. Moderate pulmonary hypertension is present. Imaging:  [x]I have personally reviewed the patients chest radiographs images and report       Results from Hospital Encounter encounter on 07/18/18   XR CHEST PORT   Narrative CLINICAL: Left-sided weakness. Cardiac arrhythmia. COMPARISON: January 5, 2018. A portable view of the chest from 0954 hours:    Lung fields appear clear. Minimal blunting left costophrenic angle. No focal  airspace disease. Thoracolumbar scoliosis. Mediastinal silhouette stable. Atherosclerotic calcifications in the aortic arch . Impression Impression:    Possible small left pleural effusion. Otherwise unremarkable        MRI Brain 7/18/18  IMPRESSION  1. Multifocal small areas of acute ischemic infarct right MCA territory  including right insula, deep white matter and posterior right frontal cortex. 2. Interval progression of moderate bilateral deep white matter signal changes  nonspecific, likely chronic microvascular ischemic disease. 3. Slight progression of mild diffuse volume loss. IMPRESSION:   Principal Problem:    Acute CVA (cerebrovascular accident) ([address] (7/18/2018)    Active Problems:    CAD (coronary artery disease) (1/5/2018)      Hypercholesterolemia (1/5/2018)      Atrial fibrillation with slow ventricular response ([address] (7/18/2018)      SSS (sick sinus syndrome) ([address] (7/18/2018)    · HTN      RECOMMENDATIONS:   · Pulm: compensated respirations; on RA and SPO2 97%, no clubbing or prior pulmonary issues to suspect pulmonary shunt.  Spoke with cardiology and clinical suspicion is low with timing of bubble appearance and afib  · Continue aspiration precautions  · CXR for follow up appears to be stable preliminary and ABG on 100% O2- spoke with staff  · Cardiac: keep SBP>120/60 mmhg; slow Afib; no pacemaker needed at present as per cardiology  · Endo: TSH normal; cortisol normal  · Neuro: ASA, statin; Neuro on case; on coumadin therapy - target INR 2-3  · Fluids: NS 75/hr, may stop when PO intake reliable  · Renal: watch IOs and renal fn; replaced lytes as needed  · GI: SLP team cleared for oral diet. Tolerating well but portion low  · Hem: stable Hb and platelets; daily cbc  · Endo: poc glucose  · Proph:  DVT proph - INR therapeutic; GI proph Pepcid PO  · D/w patient - updated  · Transfer to tele today  Will defer respective systems problem management to primary and other consultant and follow patient with primary and other medical team  Further recommendations will be based on the patient's response to recommended treatment and results of the investigation ordered. Quality Care: PPI, DVT prophylaxis, HOB elevated, Infection control all reviewed and addressed. PAIN AND SEDATION: none   · Skin/Wound: no active issues  · Nutrition: oral diet as tolerated  · Prophylaxis: DVT and GI Prophylaxis reviewed  · Restraints: none  · PT/OT eval and treat: as needed  · Lines/Tubes: PIVs  ADVANCE DIRECTIVE: Full Code     Mod complexity decision making was performed in this consultation and evaluation of this patient         Simran Ariza MD    Addendum    Shunt gradient calculated based on ABG on RA and on 100% O2 for 15-20 min and normal [9]. No evidence of pulmonary shunting. No further work up for PAVM. Will sign off once patient is transferred out of ICU.     Simran Ariza MD

## 2018-07-24 NOTE — PROGRESS NOTES
DC plan: Home with Cherri Oconnor      CM visited patient and discussed HHC. FOC offered and [facility] [phone] selected. CM has called son and confirmed arrangements and everyone is agreeable that once patient is discharged which is expected in 2-3 days, HHC will begin following up within 24 hours. Per son he has a RW in attic and will pull it down to see condition so that if a newer walker is needed we can provide prior to discharge date. CM remains available. Care Management Interventions  PCP Verified by CM:  Yes  Transition of Care Consult (CM Consult): [address]: Yes  Physical Therapy Consult: Yes  Occupational Therapy Consult: Yes  Speech Therapy Consult: Yes  Current Support Network: Lives with Caregiver  Confirm Follow Up Transport: Family  Plan discussed with Pt/Family/Caregiver: Yes  Freedom of Choice Offered: Yes  Discharge Location  Discharge Placement: Home with home health

## 2018-07-24 NOTE — PROGRESS NOTES
Bedside and Verbal shift change report received from DARRELL Hugo RN (offgoing nurse). Report included the following information SBAR, Kardex, Intake/Output, MAR and Recent Results. 2115 Shift assessment completed, see EMR. Patient alert and  orient x4, eyes round reactive to light at 3 mm. Patient on room air and lung sound clear on auscultation. Patient has active bowel sound. 0045 Reassessment completed, see EMR    0400 Reassessment completed, see EMR. Patient got up to bedside commode and complained of headache 6/10 and dizziness after returning to bed. Offered medication patient refused. 0430 After reassessing patient, denies dizziness and headache 3/10 and refused pain medication. Bedside and Verbal shift change report given to JADE Ashley RN (oncoming nurse) . Report included the following information SBAR, Kardex, Intake/Output, MAR and Recent Results.

## 2018-07-24 NOTE — PROGRESS NOTES
DC Plan: Home Health when medically stable      CM attempted to stop in and speak with patient. Pt was drowsy and has asked CM to come by another time. Patient is progressing with physical therapy and desires to return home. Prior to dc, patient will need to be [facility] and a final [facility] chosen, With possibility of patient being moved to another unit today vs tomorrow- will not deliver RW until patient is on the unit she will be discharged from to help prevent from misplacing in the hospital.     CM remains available and will finalize dc plans as we continue the hospitalization. Care Management Interventions  PCP Verified by CM: Yes  Transition of Care Consult (CM Consult): Discharge Planning  Physical Therapy Consult: Yes  Occupational Therapy Consult: Yes  Speech Therapy Consult: Yes  Current Support Network: Relative's Home (lives with son, Lucinda Villalpando)  Confirm Follow Up Transport: Family  Plan discussed with Pt/Family/Caregiver:  Yes

## 2018-07-25 LAB
ANION GAP SERPL CALC-SCNC: 8 MMOL/L (ref 3–18)
BASOPHILS # BLD: 0 K/UL (ref 0–0.1)
BASOPHILS NFR BLD: 0 % (ref 0–2)
BUN SERPL-MCNC: 8 MG/DL (ref 7–18)
BUN/CREAT SERPL: 8 (ref 12–20)
CALCIUM SERPL-MCNC: 9.1 MG/DL (ref 8.5–10.1)
CHLORIDE SERPL-SCNC: 104 MMOL/L (ref 100–108)
CO2 SERPL-SCNC: 26 MMOL/L (ref 21–32)
CREAT SERPL-MCNC: 0.95 MG/DL (ref 0.6–1.3)
DIFFERENTIAL METHOD BLD: NORMAL
EOSINOPHIL # BLD: 0.1 K/UL (ref 0–0.4)
EOSINOPHIL NFR BLD: 2 % (ref 0–5)
ERYTHROCYTE [DISTWIDTH] IN BLOOD BY AUTOMATED COUNT: 13.2 % (ref 11.6–14.5)
GLUCOSE SERPL-MCNC: 105 MG/DL (ref 74–99)
HCT VFR BLD AUTO: 40 % (ref 35–45)
HGB BLD-MCNC: 12.8 G/DL (ref 12–16)
INR PPP: 2.9 (ref 0.8–1.2)
LYMPHOCYTES # BLD: 1.7 K/UL (ref 0.9–3.6)
LYMPHOCYTES NFR BLD: 33 % (ref 21–52)
MAGNESIUM SERPL-MCNC: 1.8 MG/DL (ref 1.6–2.6)
MCH RBC QN AUTO: 29.2 PG (ref 24–34)
MCHC RBC AUTO-ENTMCNC: 32 G/DL (ref 31–37)
MCV RBC AUTO: 91.1 FL (ref 74–97)
MONOCYTES # BLD: 0.4 K/UL (ref 0.05–1.2)
MONOCYTES NFR BLD: 7 % (ref 3–10)
NEUTS SEG # BLD: 2.9 K/UL (ref 1.8–8)
NEUTS SEG NFR BLD: 58 % (ref 40–73)
PLATELET # BLD AUTO: 210 K/UL (ref 135–420)
PMV BLD AUTO: 10.4 FL (ref 9.2–11.8)
POTASSIUM SERPL-SCNC: 3.5 MMOL/L (ref 3.5–5.5)
PROTHROMBIN TIME: 29.6 SEC (ref 11.5–15.2)
RBC # BLD AUTO: 4.39 M/UL (ref 4.2–5.3)
SODIUM SERPL-SCNC: 138 MMOL/L (ref 136–145)
WBC # BLD AUTO: 5 K/UL (ref 4.6–13.2)

## 2018-07-25 PROCEDURE — 83735 ASSAY OF MAGNESIUM: CPT | Performed by: PSYCHIATRY & NEUROLOGY

## 2018-07-25 PROCEDURE — 74011250637 HC RX REV CODE- 250/637: Performed by: INTERNAL MEDICINE

## 2018-07-25 PROCEDURE — 74011250636 HC RX REV CODE- 250/636: Performed by: INTERNAL MEDICINE

## 2018-07-25 PROCEDURE — 85025 COMPLETE CBC W/AUTO DIFF WBC: CPT | Performed by: PSYCHIATRY & NEUROLOGY

## 2018-07-25 PROCEDURE — 97530 THERAPEUTIC ACTIVITIES: CPT

## 2018-07-25 PROCEDURE — 65660000000 HC RM CCU STEPDOWN

## 2018-07-25 PROCEDURE — 97116 GAIT TRAINING THERAPY: CPT

## 2018-07-25 PROCEDURE — 85610 PROTHROMBIN TIME: CPT | Performed by: PSYCHIATRY & NEUROLOGY

## 2018-07-25 PROCEDURE — 74011250637 HC RX REV CODE- 250/637: Performed by: PSYCHIATRY & NEUROLOGY

## 2018-07-25 PROCEDURE — 80048 BASIC METABOLIC PNL TOTAL CA: CPT | Performed by: PSYCHIATRY & NEUROLOGY

## 2018-07-25 RX ORDER — WARFARIN 1 MG/1
0.5 TABLET ORAL ONCE
Status: COMPLETED | OUTPATIENT
Start: 2018-07-25 | End: 2018-07-25

## 2018-07-25 RX ORDER — POTASSIUM CHLORIDE 20 MEQ/1
20 TABLET, EXTENDED RELEASE ORAL ONCE
Status: COMPLETED | OUTPATIENT
Start: 2018-07-25 | End: 2018-07-25

## 2018-07-25 RX ORDER — MAGNESIUM SULFATE 1 G/100ML
1 INJECTION INTRAVENOUS ONCE
Status: COMPLETED | OUTPATIENT
Start: 2018-07-25 | End: 2018-07-25

## 2018-07-25 RX ADMIN — Medication 10 ML: at 06:19

## 2018-07-25 RX ADMIN — FAMOTIDINE 20 MG: 20 TABLET ORAL at 12:48

## 2018-07-25 RX ADMIN — POTASSIUM CHLORIDE 20 MEQ: 20 TABLET, EXTENDED RELEASE ORAL at 06:11

## 2018-07-25 RX ADMIN — MAGNESIUM SULFATE HEPTAHYDRATE 1 G: 1 INJECTION, SOLUTION INTRAVENOUS at 06:11

## 2018-07-25 RX ADMIN — SODIUM CHLORIDE 75 ML/HR: 900 INJECTION, SOLUTION INTRAVENOUS at 08:14

## 2018-07-25 RX ADMIN — WARFARIN SODIUM 0.5 MG: 1 TABLET ORAL at 17:36

## 2018-07-25 RX ADMIN — Medication 10 ML: at 17:36

## 2018-07-25 RX ADMIN — SODIUM CHLORIDE 75 ML/HR: 900 INJECTION, SOLUTION INTRAVENOUS at 21:25

## 2018-07-25 RX ADMIN — ATORVASTATIN CALCIUM 80 MG: 20 TABLET, FILM COATED ORAL at 12:48

## 2018-07-25 NOTE — PROGRESS NOTES
[facility]  Pulmonary, Critical Care, and Sleep Medicine    Pulm/CC Note    Name: Arik Andre MRN: [id number]   : 1930 Hospital: [facility]   Date: 2018  Room: [facility]/     Subjective:   Patient is a 80 y. o. female with hx of HTN, hyperlipidemia, CAD/MI and atrial fibrillation (on aspirin only, not taking coumadin), who presented with left sided upper and lower extremity weakness, slurred speech and left facial droop on 18. The patient was evaluated by tele neurology, onset was not clear and not given tPA. CT head nil acute. Patient was bradycardic with slow Afib and hypotensive - started on dopamine which is at 5 mcg/kg/min.    18  Patient awake, alert, follows all commands, on room air  Remained hemodynamically stable, asymptomatic  Remains in slow rate Afib, asymptomatic  No cough or CP or SOB. No worsening left sided weakness  Afebrile. Tolerating PO diet  Son at bedside and discussed their questions to their satisfaction  Son has been bringing food to patient's room and cautioned feeding patient without checking with staff. He agrees.     Review of Systems   General ROS: negative for  - fever, chills, weight loss, fatigue and malaise  Cardiovascular ROS: negative for - chest pain, edema, murmur, orthopnea, palpitations or PND  Gastrointestinal ROS: no abdominal pain, change in bowel habits, or black or bloody stools  Dermatological ROS: negative for - pruritus, rash or skin lesion changes       Past Medical History:   Diagnosis Date    Arthritis     Atrial fibrillation ([address]     CAD (coronary artery disease)     Edema     Gastrointestinal disorder     High cholesterol     Hypertension     Hypoglycemia     Hypoglycemia     IBS (irritable bowel syndrome)     MI, old       Past Surgical History:   Procedure Laterality Date    HX CHOLECYSTECTOMY      HX KNEE REPLACEMENT      HX ORTHOPAEDIC      Left TKR      Allergies   Allergen Reactions    Amoxicillin Rash      Social History   Substance Use Topics    Smoking status: Never Smoker    Smokeless tobacco: Not on file    Alcohol use 0.5 oz/week     1 Glasses of wine per week      Comment: every few weeks        Current Facility-Administered Medications   Medication Dose Route Frequency    warfarin (COUMADIN) tablet 0.5 mg  0.5 mg Oral ONCE    atorvastatin (LIPITOR) tablet 80 mg  80 mg Oral DAILY    0.9% sodium chloride infusion  75 mL/hr IntraVENous CONTINUOUS    famotidine (PEPCID) tablet 20 mg  20 mg Oral DAILY    Warfarin - Pharmacy to dose    Other Rx Dosing/Monitoring    sodium chloride (NS) flush 5-10 mL  5-10 mL IntraVENous Q8H       Latest lactic acid:   Lactic acid   Date Value Ref Range Status   2012 1.3 0.4 - 2.0 MMOL/L Final         Objective:   Vital Signs:    Visit Vitals    /64    Pulse (!) 57    Temp 97.6 °F (36.4 °C)    Resp 11    Ht 5' (1.524 m)    Wt 70.2 kg (154 lb 12.2 oz)    SpO2 98%    BMI 30.23 kg/m2       O2 Device: Room air   Temp (24hrs), Av.4 °F (36.3 °C), Min:97.3 °F (36.3 °C), Max:97.6 °F (36.4 °C)       Intake/Output:   Last shift:      701 - 1900  In: -   Out: 150 [Urine:150]  Last 3 shifts: 1901 -  0700  In: 7854 [P.O.:200;  I.V.:2715]  Out: 8362 [Urine:3945]    Intake/Output Summary (Last 24 hours) at 18 1200  Last data filed at 18 1017   Gross per 24 hour   Intake           1602.5 ml   Output             2020 ml   Net           -417.5 ml           Physical Exam:   Gene: comfortable; on room air, AAO x 3; acyanotic; awake, alert, following all commands  Neck: No adenopathy or thyroid swelling  CVS: S1S2 no murmurs; JVD not elevated  RS: Good air entry bilaterally, no wheezes or crackles  Abd: soft, non tender, no hepatosplenomegaly, no abd distension  Neuro: awake, alert, no facial droop; no left sided neglect; left UE and LE strength 4+/5  Extrm: no leg edema or swelling or clubbing  Skin: no rash  Lymphatic: no cervical or supraclavicular adenopathy      Data review:     Recent Results (from the past 12 hour(s))   PROTHROMBIN TIME + INR    Collection Time: 07/25/18  4:20 AM   Result Value Ref Range    Prothrombin time 29.6 (H) 11.5 - 15.2 sec    INR 2.9 (H) 0.8 - 1.2     METABOLIC PANEL, BASIC    Collection Time: 07/25/18  4:20 AM   Result Value Ref Range    Sodium 138 136 - 145 mmol/L    Potassium 3.5 3.5 - 5.5 mmol/L    Chloride 104 100 - 108 mmol/L    CO2 26 21 - 32 mmol/L    Anion gap 8 3.0 - 18 mmol/L    Glucose 105 (H) 74 - 99 mg/dL    BUN 8 7.0 - 18 MG/DL    Creatinine 0.95 0.6 - 1.3 MG/DL    BUN/Creatinine ratio 8 (L) 12 - 20      GFR est AA >60 >60 ml/min/1.73m2    GFR est non-AA 56 (L) >60 ml/min/1.73m2    Calcium 9.1 8.5 - 10.1 MG/DL   CBC WITH AUTOMATED DIFF    Collection Time: 07/25/18  4:20 AM   Result Value Ref Range    WBC 5.0 4.6 - 13.2 K/uL    RBC 4.39 4.20 - 5.30 M/uL    HGB 12.8 12.0 - 16.0 g/dL    HCT 40.0 35.0 - 45.0 %    MCV 91.1 74.0 - 97.0 FL    MCH 29.2 24.0 - 34.0 PG    MCHC 32.0 31.0 - 37.0 g/dL    RDW 13.2 11.6 - 14.5 %    PLATELET 942 262 - 108 K/uL    MPV 10.4 9.2 - 11.8 FL    NEUTROPHILS 58 40 - 73 %    LYMPHOCYTES 33 21 - 52 %    MONOCYTES 7 3 - 10 %    EOSINOPHILS 2 0 - 5 %    BASOPHILS 0 0 - 2 %    ABS. NEUTROPHILS 2.9 1.8 - 8.0 K/UL    ABS. LYMPHOCYTES 1.7 0.9 - 3.6 K/UL    ABS. MONOCYTES 0.4 0.05 - 1.2 K/UL    ABS. EOSINOPHILS 0.1 0.0 - 0.4 K/UL    ABS. BASOPHILS 0.0 0.0 - 0.1 K/UL    DF AUTOMATED     MAGNESIUM    Collection Time: 07/25/18  4:20 AM   Result Value Ref Range    Magnesium 1.8 1.6 - 2.6 mg/dL             Recent Labs      07/24/18   1108  07/24/18   0949   FIO2I  21  100   HCO3I  24.7  27.2*   PCO2I  40.0  41.1   PHI  7.398  7.429   PO2I  78*  603*       All Micro Results     None          ECHO   · Saline contrast was given to evaluate for intracardiac shunt.  There is no evidence of intracrdiac large shunt  · Late bubbles suggest an extra cardiac shunt  · Left ventricular hyperdynamic systolic function. Estimated left ventricular ejection fraction is >70%. Please note that echo was performed when pt was on dopamine infusion 5 microgram/kg/min. · Left atrial cavity size is moderately dilated. · Right atrial cavity size is moderately dilated. · Aortic valve is trileaflet. Aortic valve sclerosis. · Mitral valve non-specific thickening. Mild to moderate mitral valve regurgitation. · Moderate tricuspid valve regurgitation is present. Pulmonary arterial systolic pressure is 42 mmHg. Moderate pulmonary hypertension is present. Imaging:  [x]I have personally reviewed the patients chest radiographs images and report       Results from Hospital Encounter encounter on 07/18/18   XR CHEST PORT   Narrative EXAM:Chest X-Ray      History: Shortness of breath, pleural effusion follow-up    Technique:  Portable Frontal View    Comparison: 07/18/2018, 01/05/2018    _______________    FINDINGS:  -Right-sided PICC line tip projects over the mid SVC. The trachea is midline. Stable cardiac and mediastinal silhouette contours. Stable hilar vasculature. Chronic paramedial bilateral upper lobe parenchymal scarring/atelectasis. No  interval change of the minimally blunted left costophrenic sulcus. Otherwise no  developing parenchymal opacity. No pneumothorax. Stable intact osseous structures    _______________         Impression IMPRESSION:    1. No interval change of the minimally blunted left costophrenic sulcus,  potentially unresolved tiny effusion. Otherwise, no new process. MRI Brain 7/18/18  IMPRESSION  1. Multifocal small areas of acute ischemic infarct right MCA territory  including right insula, deep white matter and posterior right frontal cortex. 2. Interval progression of moderate bilateral deep white matter signal changes  nonspecific, likely chronic microvascular ischemic disease.   3. Slight progression of mild diffuse volume loss.      IMPRESSION:   Principal Problem:    Acute CVA (cerebrovascular accident) ([facility] [address] (7/18/2018)    Active Problems:    CAD (coronary artery disease) (1/5/2018)      Hypercholesterolemia (1/5/2018)      Atrial fibrillation with slow ventricular response ([facility] [address] (7/18/2018)      SSS (sick sinus syndrome) ([facility] [address] (7/18/2018)    · HTN      RECOMMENDATIONS:   · Pulm: compensated respirations; on RA, no clubbing or prior pulmonary issues and ABG results showed no pulmonary shunt fraction. Clinical suspicion is low  · Continue aspiration precautions  · CXR for follow up appears to be stable  · Cardiac: keep SBP > 120/60 mmhg; slow Afib; no pacemaker needed at present as per cardiology  · Endo: TSH normal; cortisol normal  · Neuro: ASA, statin; Neuro on case; on coumadin therapy - target INR 2-3  · Fluids: stop IVF, encourage PO intake  · Renal: watch IOs and renal fn; replaced lytes as needed  · GI: SLP team cleared for oral diet. Tolerating well. Advised son and patient to check with staff before consuming food from outside. They agree. · Hem: stable Hb and platelets; daily cbc  · Endo: poc glucose  · Proph:  DVT proph - INR therapeutic; GI proph Pepcid PO  · D/w patient and son - updated  · Transfer to tele today. Will sign off. Will defer respective systems problem management to primary and other consultant. Quality Care: PPI, DVT prophylaxis, HOB elevated, Infection control all reviewed and addressed.   PAIN AND SEDATION: none   · Skin/Wound: no active issues  · Nutrition: oral diet as tolerated  · Prophylaxis: DVT and GI Prophylaxis reviewed  · Restraints: none  · PT/OT eval and treat: as needed  · Lines/Tubes: PIVs  ADVANCE DIRECTIVE: Full Code     Mod complexity decision making was performed in this consultation and evaluation of this patient         Nydia Vann MD

## 2018-07-25 NOTE — PROGRESS NOTES
Cardiology Progress Note        Patient: Kang Lanza        Sex: female          DOA: 7/18/2018  YOB: 1930      Age:  80 y.o.        LOS:  LOS: 7 days   Assessment/Plan     Principal Problem:    Acute CVA (cerebrovascular accident) ([facility] [address] (7/18/2018)    Active Problems:    CAD (coronary artery disease) (1/5/2018)      Hypercholesterolemia (1/5/2018)      Atrial fibrillation with slow ventricular response ([facility] [address] (7/18/2018)      SSS (sick sinus syndrome) ([facility] [address] (7/18/2018)        Plan:  Afib rate stable, resting intermittent asymptomatic  Bradycardia with normal BP, exertion heart rate increased to 100/min. No indication for pacemaker. Ambulatory heart rate improved  I will sign off and please call if any questions and concerned. Follow up with me in 1-2 week. THX  Discussed with patient. Subjective:    cc:afib  Bradycardia  Mild SSS. REVIEW OF SYSTEMS:     General: No fevers or chills. Cardiovascular: No chest pain or pressure. No palpitations. No ankle swelling  Pulmonary: No SOB, orthopnea, PND  Gastrointestinal: No nausea, vomiting or diarrhea      Objective:      Visit Vitals    /87 (BP 1 Location: Left arm, BP Patient Position: At rest)    Pulse (!) 59    Temp 98 °F (36.7 °C)    Resp 14    Ht 5' (1.524 m)    Wt 70.2 kg (154 lb 12.2 oz)    SpO2 97%    BMI 30.23 kg/m2     Body mass index is 30.23 kg/(m^2). Physical Exam:  General Appearance: Comfortable, not using accessory muscles of respiration. NECK: No JVD, no thyroidomeglay. LUNGS: Clear bilaterally. HEART: S1 variable +S2 audible,    ABD: Non-tender, BS Audible    EXT: No edema, and no cysnosis. VASCULAR EXAM: Pulses are intact. PSYCHIATRIC EXAM: Mood is appropriate. MUSCULOSKELETAL: Grossly no joint deformity. NEUROLOGICAL: No motor and sensory deficit, Cranial nerves II-XII intact.   Medication:  Current Facility-Administered Medications   Medication Dose Route Frequency    warfarin (COUMADIN) tablet 0.5 mg  0.5 mg Oral ONCE    ondansetron (ZOFRAN) injection 4 mg  4 mg IntraVENous Q6H PRN    ELECTROLYTE REPLACEMENT PROTOCOL-POTASSIUM  1 Each Other PRN    ELECTROLYTE REPLACEMENT PROTOCOL-MAGNESIUM  1 Each Other PRN    atorvastatin (LIPITOR) tablet 80 mg  80 mg Oral DAILY    0.9% sodium chloride infusion  75 mL/hr IntraVENous CONTINUOUS    famotidine (PEPCID) tablet 20 mg  20 mg Oral DAILY    Warfarin - Pharmacy to dose    Other Rx Dosing/Monitoring    sodium chloride (NS) flush 5-10 mL  5-10 mL IntraVENous Q8H    sodium chloride (NS) flush 5-10 mL  5-10 mL IntraVENous PRN               Lab/Data Reviewed:  Procedures/imaging: see electronic medical records for all procedures/Xrays   and details which were not copied into this note but were reviewed prior to creation of Plan       All lab results for the last 24 hours reviewed.      Recent Labs      07/25/18 0420 07/24/18 0430 07/23/18 0430   WBC  5.0  5.2  6.2   HGB  12.8  12.7  13.3   HCT  40.0  39.6  40.6   PLT  210  200  216     Recent Labs      07/25/18 0420 07/24/18 0430 07/23/18 0430   NA  138  137   --    K  3.5  4.0  4.3   CL  104  105   --    CO2  26  28   --    GLU  105*  86   --    BUN  8  8   --    CREA  0.95  0.87   --    CA  9.1  9.3   --        Signed By: Page Ohara MD     July 25, 2018

## 2018-07-25 NOTE — ROUTINE PROCESS
Bedside, Verbal and Written shift change report given to J. Marius Sandifer (oncoming nurse) by NETO Stewart (offgoing nurse). Report included the following information SBAR, Kardex, Intake/Output and Recent Results. Assumed care of pt at this time. Pt aox4, following commands, denies pain, room air, remains tele monitored. Up with assistance to bedside commode, IV fluids, RUAPICC.  1000. Pt status unchanged, VSS, sitting up in bed watching TV.  1200. Reassessed, status unchanged, VSS.  1250. Scheduled med's given, pt tolerated well. TRANSFER - OUT REPORT:    Verbal report given to CARA Barber(name) on [facility]  being transferred to [address] [place] [address]) for routine progression of care       Report consisted of patients Situation, Background, Assessment and   Recommendations(SBAR). Information from the following report(s) SBAR, Kardex, Intake/Output and Recent Results was reviewed with the receiving nurse. Lines:   PICC Double Lumen 07/20/18 Right;Brachial (Active)   Central Line Being Utilized Yes 7/25/2018  4:15 AM   Criteria for Appropriate Use Irritant/vesicant medication 7/25/2018  4:15 AM   Site Assessment Clean, dry, & intact 7/25/2018  4:15 AM   Phlebitis Assessment 0 7/25/2018  4:15 AM   Infiltration Assessment 0 7/25/2018  4:15 AM   Date of Last Dressing Change 07/20/18 7/25/2018  4:15 AM   Dressing Status Clean, dry, & intact 7/25/2018  4:15 AM   Action Taken Open ports on tubing capped 7/25/2018  4:15 AM   Dressing Type Disk with Chlorhexadine gluconate (CHG) 7/25/2018  4:15 AM   Hub Color/Line Status Red;Capped 7/25/2018  4:15 AM   Positive Blood Return (Site #1) Yes 7/25/2018  4:15 AM   Hub Color/Line Status Purple; Infusing 7/25/2018  4:15 AM   Positive Blood Return (Site #2) Yes 7/25/2018  4:15 AM   Alcohol Cap Used Yes 7/25/2018  4:15 AM        Opportunity for questions and clarification was provided.       Patient transported with:   Monitor  Tech

## 2018-07-25 NOTE — PROGRESS NOTES
Bedside and Verbal shift change report received from JADE Scott RN (offgoing nurse). Report included the following information SBAR, Kardex, Intake/Output, MAR and Recent Results. 2015 Shift assessment completed, see EMR. Patient alert and orient x4, eyes round reactive to light at x3 mm. Patient on room air, lung sound clear on auscultation. Patient has active bowel sound. 0030 Reassessment completed, see EMR    5153 Reassessment completed, see EMR    Bedside and Verbal shift change report given to RUDOLPH Bentley RN (oncoming nurse)  Report included the following information SBAR, Kardex, Intake/Output, MAR and Recent Results.

## 2018-07-25 NOTE — PROGRESS NOTES
Hospitalist Progress Note Patient: Heather Navarro MRN: [id number]  CSN: [id number] YOB: 1930  Age: 80 y.o. Sex: female DOA: 7/18/2018 LOS:  LOS: 7 days Assessment/Plan Patient Active Problem List  
Diagnosis Code  Abdominal pain, other specified site R10.9  Atrial fibrillation (HCC) I48.91  
 Constipation K59.00  
 Encephalopathy, unspecified G93.40  Abdominal wall hematoma S30. Karrie Styles  CAD (coronary artery disease) I25.10  Hypercholesterolemia E78.00  Hypertension I10  
 Hx of myocardial infarction I25.2  Elevated INR R79.1  Atrial fibrillation with slow ventricular response (HCC) I48.91  
 SSS (sick sinus syndrome) ([facility]) I49.5  Acute CVA (cerebrovascular accident) [facility]) I63.9  
  
 
 
 
81 yo female admitted for CVA, A-fib with slow vent response. CRITICAL CARE PLAN Resp - No acute respiratory issues ID - no evidence of infection. CVS - A-fib with slow vent response - cardizem stopped. off dopamine drip. HR stable, On coumadin, pharmacy dosing. Echo with hyperdynamic LVF, EF of >70%, no intracardiac shunt. Cardiology following, No indication for pacemaker Heme/onc - Follow H&H, plts. INR. Renal - Trend BUN, Cr, follow I/O,  Check and replace Mg, K, phos. Endocrine -  Follow [facility] Neuro/ Pain/ Sedation - Acute CVA - continue with aspirin. MRI with multifocal small areas of acute ischemic infarct right MCA territory. CTA head and neck with no hemodynamically significant stenosis. 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 No intervention needed at this time per neurology. Neurology following On coumadin pharmacy dosing. Aspirin stopped by neuro. GI - seen by SLP, on regular diet.  
 
Prophylaxis - DVT: coumadin, GI: pepcid. ambulate Transfer to [facility] Disposition : 2-3 days Review of systems General: No fevers or chills. Cardiovascular: No chest pain or pressure. No palpitations. Pulmonary: No shortness of breath. Gastrointestinal: No nausea, vomiting. Physical Exam: 
General: Awake, cooperative, no acute distress   
HEENT: NC, Atraumatic. PERRLA, anicteric sclerae. Lungs: CTA Bilaterally. No Wheezing/Rhonchi/Rales. Heart:  Regular  rhythm,  No murmur, No Rubs, No Gallops Abdomen: Soft, Non distended, Non tender.  +Bowel sounds, Extremities: No c/c/e Psych:   Not anxious or agitated. Neurological Exam:  
Mental Status:  Alert , co-operative , memory intact, speech disarthric. Cranial Nerves:  Intact visual fields. PERRL, EOM's full, no nystagmus, no ptosis. Facial sensation is normal. Facial movement is symmetric. Palate is midline. Normal sternocleidomastoid strength. Tongue is midline. Hearing is intact bilaterally. Motor:  5/5 strength in upper and right lower proximal and distal muscles. Left side weak compare to right Normal bulk and tone. Reflexes:  Deep tendon reflexes 2+/4 and symmetrical.   
Sensory:  Normal and symmetric bilaterally. Gait:  Not tested. Cerebellar:  No cerebellar signs present. Vital signs/Intake and Output: 
Visit Vitals  /72  Pulse 62  Temp 97.6 °F (36.4 °C)  Resp 13  Ht 5' (1.524 m)  Wt 70.2 kg (154 lb 12.2 oz)  SpO2 96%  BMI 30.23 kg/m2 Current Shift:  07/25 0701 - 07/25 1900 In: -  
Out: 150 [Urine:150] Last three shifts:  07/23 1901 - 07/25 0700 In: 5652 [P.O.:200; I.V.:2715] Out: 3945 [[id number]] Labs: Results:  
   
Chemistry Recent Labs  
   07/25/18 
 0420  07/24/18 
 0430  07/23/18 
 0430 GLU  105*  86   --   
NA  138  137   --   
K  3.5  4.0  4.3 CL  104  105   --   
CO2  26  28   --   
BUN  8  8   --   
CREA  0.95  0.87   --   
CA  9.1  9.3   --   
AGAP  8  4   -- BUCR  8*  9*   --   
  
CBC w/Diff Recent Labs  
   07/25/18 0420 07/24/18 0430 07/23/18 0430 WBC  5.0  5.2  6.2  
RBC  4.39  4.33  4.47 HGB  12.8  12.7  13.3 HCT  40.0  39.6  40.6 PLT  210  200  216 GRANS  58  59  67 LYMPH  33  28  23 EOS  2  4  2 Cardiac Enzymes No results for input(s): CPK, CKND1, ZACARIAS in the last 72 hours. No lab exists for component: Sade Mages Coagulation Recent Labs  
   07/25/18 0420 07/24/18 0430 PTP  29.6*  28.2* INR  2.9*  2.7* Lipid Panel Lab Results Component Value Date/Time Cholesterol, total 200 (H) 07/19/2018 04:13 AM  
 HDL Cholesterol 62 (H) 07/19/2018 04:13 AM  
 LDL, calculated 119.4 (H) 07/19/2018 04:13 AM  
 VLDL, calculated 18.6 07/19/2018 04:13 AM  
 Triglyceride 93 07/19/2018 04:13 AM  
 CHOL/HDL Ratio 3.2 07/19/2018 04:13 AM  
  
BNP No results for input(s): BNPP in the last 72 hours. Liver Enzymes No results for input(s): TP, ALB, TBIL, AP, SGOT, GPT in the last 72 hours. No lab exists for component: DBIL Thyroid Studies Lab Results Component Value Date/Time TSH 1.50 07/19/2018 04:13 AM  
    
Procedures/imaging: see electronic medical records for all procedures/Xrays and details which were not copied into this note but were reviewed prior to creation of Plan

## 2018-07-25 NOTE — PROGRESS NOTES
Problem: Mobility Impaired (Adult and Pediatric)  Goal: *Acute Goals and Plan of Care (Insert Text)  Physical Therapy Goals  Initiated 7/19/2018 and to be accomplished within 3-7 day(s)  1. Patient will move from supine to sit and sit to supine  in bed with supervision/set-up. 2.  Patient will transfer from bed to chair and chair to bed with supervision/set-up using the least restrictive device. 3.  Patient will perform sit to stand with supervision/set-up. 4.  Patient will ambulate with supervision/set-up for 150 feet with the least restrictive device. 5.  Patient will ascend/descend 6 stairs with B handrail(s) with minimal assistance/contact guard assist.   Outcome: Progressing Towards Goal  physical Therapy TREATMENT    Patient: Abelardo Sun (76 y.o. female)  Date: 7/25/2018  Diagnosis: Atrial fibrillation with slow ventricular response (HCC) Acute CVA (cerebrovascular accident) [facility])       Precautions: Fall   Chart, physical therapy assessment, plan of care and goals were reviewed. ASSESSMENT:  Pt is more alert and talkative for today's session. Pt is able te resume reasonable to richard ambulation with HR range of 54-92 BP. Pt present with L shifting of trunk and bilateral knee valgus for gait. Reports moderate fatigue on completion. Transport present to move pt to another unit. Will continue to work toward safe D/c plan. Progression toward goals:  []      Improving appropriately and progressing toward goals  [x]      Improving slowly and progressing toward goals  []      Not making progress toward goals and plan of care will be adjusted     PLAN:  Patient continues to benefit from skilled intervention to address the above impairments. Continue treatment per established plan of care. Discharge Recommendations: To Be Determined  Further Equipment Recommendations for Discharge:  bedside commode and rolling walker     SUBJECTIVE:   Patient stated I'm going upstairs somewhere.     OBJECTIVE DATA SUMMARY:   Critical Behavior:  Neurologic State: Alert  Orientation Level: Oriented X4  Cognition: Follows commands  Safety/Judgement: Awareness of environment  Functional Mobility Training:  Bed Mobility:  Rolling: Stand-by assistance  Supine to Sit: Stand-by assistance  Sit to Supine: Stand-by assistance  Transfers:  Sit to Stand: Stand-by assistance;Contact guard assistance  Stand to Sit: Stand-by assistance;Contact guard assistance  Balance:  Sitting: Intact  Standing: Intact; With support  Standing - Static: Good  Standing - Dynamic : Fair  Ambulation/Gait Training:  Distance (ft): 350 Feet (ft)  Assistive Device: Gait belt;Walker, rolling  Ambulation - Level of Assistance: Contact guard assistance  Gait Abnormalities: Decreased step clearance  Speed/Tangela: Slow  Step Length: Right shortened;Left shortened  Pain:  Pain Scale 1: Numeric (0 - 10)  Pain Intensity 1: 0   Pain out  Activity Tolerance:   Good  Please refer to the flowsheet for vital signs taken during this treatment.   After treatment:   [] Patient left in no apparent distress sitting up in chair  [x] Patient left in no apparent distress in bed to be transported to another unit   [x] Call bell left within reach  [x] Nursing notified  [] Caregiver present  [] Bed alarm activated      Lorena Ro PTA   Time Calculation: 42 mins

## 2018-07-25 NOTE — PROGRESS NOTES
Pharmacy Dosing Services:  Warfarin    Consult for Warfarin Dosing by Pharmacy by Dr. David Gross provided for this 80 y.o.  female , for indication of Stroke. Dose to achieve an INR goal of 2-3    Order entered for  Warfarin  0.5 (mg) ordered to be given today at 18:00. LABS    PT/INR Lab Results   Component Value Date/Time    INR 2.9 (H) 07/25/2018 04:20 AM      Platelets Lab Results   Component Value Date/Time    PLATELET 801 59/54/8245 04:20 AM      H/H     Lab Results   Component Value Date/Time    HGB 12.8 07/25/2018 04:20 AM        Warfarin Administrations (last 168 hours)     Date/Time Action Medication Dose    07/24/18 1942 Given    warfarin (COUMADIN) tablet 1.5 mg 1 mg    07/23/18 1832 Given    warfarin (COUMADIN) tablet 2 mg 2 mg    07/22/18 1859 Given    warfarin (COUMADIN) tablet 3 mg 3 mg    07/21/18 1709 Given    warfarin (COUMADIN) tablet 7.5 mg 7.5 mg    07/20/18 1743 Given    warfarin (COUMADIN) tablet 5 mg 5 mg    07/19/18 1849 Given    warfarin (COUMADIN) tablet 5 mg 5 mg          Pharmacy to follow daily and will provide subsequent Warfarin dosing based on clinical status.   VINAY Buchanan [facility]     Contact information    [phone]

## 2018-07-25 NOTE — ROUTINE PROCESS
Bedside shift change report given to Tia Lilly RN (oncoming nurse) by Anya Prasad RN (offgoing nurse). Report included the following information SBAR, Kardex and MAR.

## 2018-07-26 LAB
ANION GAP SERPL CALC-SCNC: 9 MMOL/L (ref 3–18)
BASOPHILS # BLD: 0 K/UL (ref 0–0.1)
BASOPHILS NFR BLD: 0 % (ref 0–2)
BUN SERPL-MCNC: 8 MG/DL (ref 7–18)
BUN/CREAT SERPL: 9 (ref 12–20)
CALCIUM SERPL-MCNC: 9.2 MG/DL (ref 8.5–10.1)
CHLORIDE SERPL-SCNC: 107 MMOL/L (ref 100–108)
CO2 SERPL-SCNC: 24 MMOL/L (ref 21–32)
CREAT SERPL-MCNC: 0.9 MG/DL (ref 0.6–1.3)
DIFFERENTIAL METHOD BLD: NORMAL
EOSINOPHIL # BLD: 0.2 K/UL (ref 0–0.4)
EOSINOPHIL NFR BLD: 3 % (ref 0–5)
ERYTHROCYTE [DISTWIDTH] IN BLOOD BY AUTOMATED COUNT: 13.2 % (ref 11.6–14.5)
GLUCOSE SERPL-MCNC: 106 MG/DL (ref 74–99)
HCT VFR BLD AUTO: 40.9 % (ref 35–45)
HGB BLD-MCNC: 13.2 G/DL (ref 12–16)
INR PPP: 3 (ref 0.8–1.2)
LYMPHOCYTES # BLD: 1.6 K/UL (ref 0.9–3.6)
LYMPHOCYTES NFR BLD: 27 % (ref 21–52)
MCH RBC QN AUTO: 29.3 PG (ref 24–34)
MCHC RBC AUTO-ENTMCNC: 32.3 G/DL (ref 31–37)
MCV RBC AUTO: 90.9 FL (ref 74–97)
MONOCYTES # BLD: 0.5 K/UL (ref 0.05–1.2)
MONOCYTES NFR BLD: 9 % (ref 3–10)
NEUTS SEG # BLD: 3.7 K/UL (ref 1.8–8)
NEUTS SEG NFR BLD: 61 % (ref 40–73)
PLATELET # BLD AUTO: 222 K/UL (ref 135–420)
PMV BLD AUTO: 10.4 FL (ref 9.2–11.8)
POTASSIUM SERPL-SCNC: 3.7 MMOL/L (ref 3.5–5.5)
PROTHROMBIN TIME: 30.4 SEC (ref 11.5–15.2)
RBC # BLD AUTO: 4.5 M/UL (ref 4.2–5.3)
SODIUM SERPL-SCNC: 140 MMOL/L (ref 136–145)
WBC # BLD AUTO: 6 K/UL (ref 4.6–13.2)

## 2018-07-26 PROCEDURE — 65660000000 HC RM CCU STEPDOWN

## 2018-07-26 PROCEDURE — 85025 COMPLETE CBC W/AUTO DIFF WBC: CPT | Performed by: PSYCHIATRY & NEUROLOGY

## 2018-07-26 PROCEDURE — 97164 PT RE-EVAL EST PLAN CARE: CPT

## 2018-07-26 PROCEDURE — 74011250637 HC RX REV CODE- 250/637: Performed by: INTERNAL MEDICINE

## 2018-07-26 PROCEDURE — 80048 BASIC METABOLIC PNL TOTAL CA: CPT | Performed by: PSYCHIATRY & NEUROLOGY

## 2018-07-26 PROCEDURE — 97530 THERAPEUTIC ACTIVITIES: CPT

## 2018-07-26 PROCEDURE — 36592 COLLECT BLOOD FROM PICC: CPT

## 2018-07-26 PROCEDURE — 36415 COLL VENOUS BLD VENIPUNCTURE: CPT | Performed by: PSYCHIATRY & NEUROLOGY

## 2018-07-26 PROCEDURE — 85610 PROTHROMBIN TIME: CPT | Performed by: PSYCHIATRY & NEUROLOGY

## 2018-07-26 PROCEDURE — 74011250636 HC RX REV CODE- 250/636: Performed by: INTERNAL MEDICINE

## 2018-07-26 PROCEDURE — 74011250637 HC RX REV CODE- 250/637: Performed by: PSYCHIATRY & NEUROLOGY

## 2018-07-26 PROCEDURE — 97116 GAIT TRAINING THERAPY: CPT

## 2018-07-26 RX ORDER — WARFARIN 1 MG/1
0.5 TABLET ORAL ONCE
Status: COMPLETED | OUTPATIENT
Start: 2018-07-26 | End: 2018-07-26

## 2018-07-26 RX ADMIN — ATORVASTATIN CALCIUM 80 MG: 20 TABLET, FILM COATED ORAL at 08:50

## 2018-07-26 RX ADMIN — Medication 10 ML: at 05:00

## 2018-07-26 RX ADMIN — SODIUM CHLORIDE 75 ML/HR: 900 INJECTION, SOLUTION INTRAVENOUS at 09:53

## 2018-07-26 RX ADMIN — Medication 10 ML: at 18:29

## 2018-07-26 RX ADMIN — FAMOTIDINE 20 MG: 20 TABLET ORAL at 08:50

## 2018-07-26 RX ADMIN — WARFARIN SODIUM 0.5 MG: 1 TABLET ORAL at 18:29

## 2018-07-26 NOTE — ACP (ADVANCE CARE PLANNING)
[employer] Directive in the chart. Still needs to be scanned into EMR. Noted that page 3 is missing. Primary MPOA is daughterAbdoul Phone number not available at this time. Secondary MPOA is daughter, Jimena Dia [phone]. If need to contact MPOA for any reason, patient has requested that we utilize secondary MPOA, Jimena Dia, instead of Anamika Prather due to health concerns for Anamika Prather. She does not wish to complete a new AMD at this time. Patient is currently Full Code per her request.    Discharge plan is home with her son, Alessandra Villarreal, with home health.

## 2018-07-26 NOTE — PROGRESS NOTES
Pharmacy Dosing Services:   Warfarin    Consult for Warfarin Dosing by Pharmacy by Dr. Chase Odom provided for this 80 y.o.  female , for indication of Stroke. Dose to achieve an INR goal of 2-3    Order entered for  Warfarin  0.5 (mg) ordered to be given today at 18:00. LABS    PT/INR Lab Results   Component Value Date/Time    INR 3.0 (H) 07/26/2018 03:28 AM      Platelets Lab Results   Component Value Date/Time    PLATELET 029 77/37/4987 03:28 AM      H/H     Lab Results   Component Value Date/Time    HGB 13.2 07/26/2018 03:28 AM        Warfarin Administrations (last 168 hours)     Date/Time Action Medication Dose    07/25/18 1736 Given    warfarin (COUMADIN) tablet 0.5 mg 0.5 mg    07/24/18 1942 Given    warfarin (COUMADIN) tablet 1.5 mg 1 mg    07/23/18 1832 Given    warfarin (COUMADIN) tablet 2 mg 2 mg    07/22/18 1859 Given    warfarin (COUMADIN) tablet 3 mg 3 mg    07/21/18 1709 Given    warfarin (COUMADIN) tablet 7.5 mg 7.5 mg    07/20/18 1743 Given    warfarin (COUMADIN) tablet 5 mg 5 mg    07/19/18 1849 Given    warfarin (COUMADIN) tablet 5 mg 5 mg          Pharmacy to follow daily and will provide subsequent Warfarin dosing based on clinical status.   Vaishali Cowan, [facility]         Contact information    [phone]

## 2018-07-26 NOTE — ROUTINE PROCESS
Bedside and Verbal shift change report given to [address] [place] [address] (oncoming nurse) by José Miguel Head RN   (offgoing nurse). Report included the following information SBAR, Kardex, Intake/Output and MAR.

## 2018-07-26 NOTE — PROGRESS NOTES
Transition of care: anticipate home when medically cleared  Met with patient and IDR team anticipate d/c home tomorrow. Patient live with her son. He is going to check on walker status. She has a PCP. Patient needs follow up appointment with her PCP for 07/30/2018. Cms will call for pcp appointment. Patient has chosen [facility] referral has been placed  Care Management Interventions  PCP Verified by CM:  Yes  Transition of Care Consult (CM Consult): [address]: Yes  Physical Therapy Consult: Yes  Occupational Therapy Consult: Yes  Speech Therapy Consult: Yes  Current Support Network: Lives with Caregiver  Confirm Follow Up Transport: Family  Plan discussed with Pt/Family/Caregiver: Yes  Freedom of Choice Offered: Yes  Discharge Location  Discharge Placement: Home with home health

## 2018-07-26 NOTE — PROGRESS NOTES
Palliative Medicine will sign-off at this time. GOC have been established and all documentation has been completed for chart. If further Palliative interventions are needed please re-consult. Thank you for the opportunity to assist in the care of Mrs. Rosalia Herrera.    Noris Del Rosario MSEDITH  Palliative Medicine

## 2018-07-26 NOTE — PROGRESS NOTES
Shift Progress Note:'Assumed care of patient in bed asleep but easily aroused. Uneventful night, no s/s of distress or discomfort, call bell within reach.   Patient Vitals for the past 12 hrs:   Temp Pulse Resp BP SpO2   07/26/18 0319 97.5 °F (36.4 °C) 63 14 136/61 94 %   07/25/18 2251 97.9 °F (36.6 °C) 64 14 135/79 96 %   07/25/18 1918 98 °F (36.7 °C) 74 14 161/84 98 %

## 2018-07-26 NOTE — PROGRESS NOTES
Hospitalist Progress Note Patient: Eufemia Mckoy MRN: [id number]  CSN: [id number] YOB: 1930  Age: 80 y.o. Sex: female DOA: 7/18/2018 LOS:  LOS: 8 days Chief Complaint: 
 
Acute CVA Assessment/Plan 79 yo with acute CVA 
 
afib with slow vent response-HR stable, no indication for pacemaker HTN 
HLP Hx of MI Continue coumadin-INR therapeutic Just moved from ICU yesterday 
passing PT tests Will anticipate d/c soon-1-2 days-home with HHN Disposition : 
Patient Active Problem List  
Diagnosis Code  Abdominal pain, other specified site R10.9  Atrial fibrillation (HCC) I48.91  
 Constipation K59.00  
 Encephalopathy, unspecified G93.40  Abdominal wall hematoma S30. [place]  CAD (coronary artery disease) I25.10  Hypercholesterolemia E78.00  Hypertension I10  
 Hx of myocardial infarction I25.2  Elevated INR R79.1  Atrial fibrillation with slow ventricular response (HCC) I48.91  
 SSS (sick sinus syndrome) ([facility]) I49.5  Acute CVA (cerebrovascular accident) ([facility] [address] I63.9 Subjective: 
 
Feeling fine Denies HA, CP, SOB, new complaints Review of systems: 
 
Constitutional: denies fevers, chills, myalgias Respiratory: denies SOB, cough Cardiovascular: denies chest pain, palpitations Gastrointestinal: denies nausea, vomiting, diarrhea Vital signs/Intake and Output: 
Visit Vitals  /81 (BP 1 Location: Left arm, BP Patient Position: At rest;Sitting)  Pulse 67  Temp 98 °F (36.7 °C)  Resp 14  
 Ht 5' (1.524 m)  Wt 70.2 kg (154 lb 12.2 oz)  SpO2 98%  BMI 30.23 kg/m2 Current Shift:  07/26 0701 - 07/26 1900 In: 120 [P.O.:120] Out: - Last three shifts:  07/24 1901 - 07/26 0700 In: 952.5 [I.V.:952.5] Out: 2150 [Urine:2150] Exam: 
 
General: elderly Wf, in chair,  alert, NAD, OX3 Head/Neck: NCAT, supple, No masses, No lymphadenopathy CVS:irreg rhythm, reg rate, no M/R/G, S1/S2 heard, no thrill Lungs:Clear to auscultation bilaterally, no wheezes, rhonchi, or rales Abdomen: Soft, Nontender, No distention, Normal Bowel sounds, No hepatomegaly Extremities: No C/C/E, pulses palpable 2+ Neuro:grossly normal , follows commands Psych:appropriate Labs: Results:  
   
Chemistry Recent Labs  
   07/26/18 
 8636  07/25/18 
 0420  07/24/18 
 0430 GLU  106*  105*  86 NA  140  138  137  
K  3.7  3.5  4.0  
CL  107  104  105 CO2  24  26  28 BUN  8  8  8 CREA  0.90  0.95  0.87 CA  9.2  9.1  9.3 AGAP  9  8  4 BUCR  9*  8*  9*  
  
CBC w/Diff Recent Labs  
   07/26/18 
 0328  07/25/18 
 0420 07/24/18 
 0430 WBC  6.0  5.0  5.2  
RBC  4.50  4.39  4.33  
HGB  13.2  12.8  12.7 HCT  40.9  40.0  39.6 PLT  222  210  200 GRANS  61  58  59 LYMPH  27  33  28 EOS  3  2  4 Cardiac Enzymes No results for input(s): CPK, CKND1, ZACARIAS in the last 72 hours. No lab exists for component: [place] Coagulation Recent Labs  
   07/26/18 0328  07/25/18 
 0420 PTP  30.4*  29.6* INR  3.0*  2.9* Lipid Panel Lab Results Component Value Date/Time Cholesterol, total 200 (H) 07/19/2018 04:13 AM  
 HDL Cholesterol 62 (H) 07/19/2018 04:13 AM  
 LDL, calculated 119.4 (H) 07/19/2018 04:13 AM  
 VLDL, calculated 18.6 07/19/2018 04:13 AM  
 Triglyceride 93 07/19/2018 04:13 AM  
 CHOL/HDL Ratio 3.2 07/19/2018 04:13 AM  
  
BNP No results for input(s): BNPP in the last 72 hours. Liver Enzymes No results for input(s): TP, ALB, TBIL, AP, SGOT, GPT in the last 72 hours. No lab exists for component: DBIL Thyroid Studies Lab Results Component Value Date/Time TSH 1.50 07/19/2018 04:13 AM  
    
Procedures/imaging: see electronic medical records for all procedures/Xrays and details which were not copied into this note but were reviewed prior to creation of Plan Tara Coe MD

## 2018-07-26 NOTE — INTERDISCIPLINARY ROUNDS
Interdisciplinary team rounds were held 7/26/2018 with the following team members:Care Management, Nursing, Pharmacy, Physical Therapy and Physician and the patient. Plan of care discussed. See clinical pathway and/or care plan for interventions and desired outcomes. Discharge possible 1-2 days home with home health.

## 2018-07-26 NOTE — PROGRESS NOTES
Problem: Mobility Impaired (Adult and Pediatric)  Goal: *Acute Goals and Plan of Care (Insert Text)  Physical Therapy Goals  Initiated 7/19/2018 and to be accomplished within 3-7 day(s)  1. Patient will move from supine to sit and sit to supine  in bed with supervision/set-up. 2.  Patient will transfer from bed to chair and chair to bed with supervision/set-up using the least restrictive device. 3.  Patient will perform sit to stand with supervision/set-up. 4.  Patient will ambulate with supervision/set-up for 150 feet with the least restrictive device. 5.  Patient will ascend/descend 6 stairs with B handrail(s) with minimal assistance/contact guard assist.     Physical Therapy Goals   Initiated 7/26/2018 and to be accomplished within 7 day(s)  1. Patient will move from supine <> sit with Mod I in prep for out of bed activity and change of position. 2.  Patient will perform sit<> stand with Mod I with LRAD in prep for transfers/ambulation. 3.  Patient will transfer from bed <> chair with Mod I with LRAD for time up in chair for completion of ADL activity. 4.  Patient will ambulate 150 feet with LRAD/Mod I for improved functional mobility/safe discharge. 5.  Patient will ascend/descend 6 stairs with B handrail(s) with contact guard assist for home re-entry as needed. Outcome: Progressing Towards Goal  physical Therapy RE-EVALUATION and TREATMENT    Patient: Ita Maharaj (78 y.o. female)  Date: 7/26/2018  Diagnosis: Atrial fibrillation with slow ventricular response ([address] Acute CVA (cerebrovascular accident) [facility])       Precautions: Fall    ASSESSMENT:  Pt seen in supine prior to session w/ telemonitor and IV donned. Pt reported no pain at this time. Pt alert and oriented x4. Pt demonstrates good sitting balance and fair dynamic standing balance w/ RW. Pt continues to need VCing for proper hand placement w/ RW/GB when perform sit<>stand to ensure safe transfers.  Pt able to ambulate 160 ft w/ RW/GB but demonstrates a slow tram, decrease stride length, and decrease step clearance. Pt left sitting in upright chair where pt performed therex activity. Pt left sitting in upright chair after session, call bell and tray in reach, nurse notified after session. Patient's progression toward goals since last assessment: please see note above and flow sheet     PLAN:  Goals have been updated based on progression since last assessment. Patient continues to benefit from skilled intervention to address the above impairments. Continue to follow the patient once/twice daily to address goals. Planned Interventions:  [x]     Bed Mobility Training          [x]     Neuromuscular Re-Education  [x]     Transfer Training                []    Orthotic/Prosthetic Training  [x]     Gait Training                       []     Modalities  [x]     Therapeutic Exercises       []     Edema Management/Control  [x]     Therapeutic Activities         [x]     Patient and Family Training/Education  []     Other (comment):  Discharge Recommendations: Home Health  Further Equipment Recommendations for Discharge: rolling walker     SUBJECTIVE:   Patient stated I feel okay.     OBJECTIVE DATA SUMMARY:   Critical Behavior:  Neurologic State: Alert  Orientation Level: Oriented X4  Cognition: Follows commands  Safety/Judgement: Awareness of environment  Functional Mobility Training:  Bed Mobility:  Supine to Sit: Supervision  Transfers:  Sit to Stand: Supervision;Stand-by assistance  Stand to Sit: Supervision;Stand-by assistance   Balance:  Sitting: Intact  Standing: Intact; With support  Standing - Static: Good  Standing - Dynamic : Fair  Ambulation/Gait Training:  Distance (ft): 160 Feet (ft)  Assistive Device: Gait belt;Walker, rolling  Ambulation - Level of Assistance: Contact guard assistance  Gait Description (WDL): Exceptions to WDL  Gait Abnormalities: Decreased step clearance  Base of Support: Narrowed  Speed/Tram: Slow  Step Length: Left shortened;Right shortened  Therapeutic Exercises:       EXERCISE   Sets   Reps   Active Active Assist   Passive Self ROM   Comments   Ankle Pumps 1 10  [x] [] [] []    Quad Sets/Glut Sets   [] [] [] []    Hamstring Sets   [] [] [] []    Short Arc Quads   [] [] [] []    Heel Slides   [] [] [] []    Straight Leg Raises   [] [] [] []    Hip Abd/Add 2 10 [x] [] [] [] Hold for 5 secs   Long Arc Quads 2 10 [x] [] [] []    Seated Marching 1 20 [x] [] [] []    Standing Marching   [] [] [] []       [] [] [] []      Pain:  Pain Scale 1: Numeric (0 - 10)  Pain Intensity 1: 0  Activity Tolerance:   Fair  Please refer to the flowsheet for vital signs taken during this treatment.   After treatment:   [x]  Patient left in no apparent distress sitting up in chair  []  Patient left in no apparent distress in bed  [x]  Call bell left within reach  [x]  Nursing notified  [x]  Caregiver present (son)  []  Bed alarm activated    Chris Evans PT   Time Calculation: 29 mins

## 2018-07-26 NOTE — PROGRESS NOTES
Problem: Mobility Impaired (Adult and Pediatric)  Goal: *Acute Goals and Plan of Care (Insert Text)  Physical Therapy Goals  Initiated 7/19/2018 and to be accomplished within 3-7 day(s)  1. Patient will move from supine to sit and sit to supine  in bed with supervision/set-up. 2.  Patient will transfer from bed to chair and chair to bed with supervision/set-up using the least restrictive device. 3.  Patient will perform sit to stand with supervision/set-up. 4.  Patient will ambulate with supervision/set-up for 150 feet with the least restrictive device. 5.  Patient will ascend/descend 6 stairs with B handrail(s) with minimal assistance/contact guard assist.     Physical Therapy Goals   Initiated 7/26/2018 and to be accomplished within 7 day(s)  1. Patient will move from supine <> sit with Mod I in prep for out of bed activity and change of position. 2.  Patient will perform sit<> stand with Mod I with LRAD in prep for transfers/ambulation. 3.  Patient will transfer from bed <> chair with Mod I with LRAD for time up in chair for completion of ADL activity. 4.  Patient will ambulate 150 feet with LRAD/Mod I for improved functional mobility/safe discharge. 5.  Patient will ascend/descend 6 stairs with B handrail(s) with contact guard assist for home re-entry as needed. Outcome: Not Progressing Towards Goal  physical Therapy TREATMENT    Patient: Paty Ren (89 y.o. female)  Date: 7/26/2018  Diagnosis: Atrial fibrillation with slow ventricular response ([address] Acute CVA (cerebrovascular accident) [facility])       Precautions: Fall   Chart, physical therapy assessment, plan of care and goals were reviewed. ASSESSMENT:  Pt is able to demonstrate increasing independence, but requiring VCs for hand placement during standing transfers. Pt fatigue more quickly than on previous session, but was advanced to 2 sessions today. Will address stair mobility, on a future session.    Progression toward goals:  [] Improving appropriately and progressing toward goals  [x]      Improving slowly and progressing toward goals  []      Not making progress toward goals and plan of care will be adjusted     PLAN:  Patient continues to benefit from skilled intervention to address the above impairments. Continue treatment per established plan of care. Discharge Recommendations:  Home Health  Further Equipment Recommendations for Discharge:  bedside commode and rolling walker     SUBJECTIVE:   Patient stated I'm not doing anything right now.     OBJECTIVE DATA SUMMARY:   Critical Behavior:  Neurologic State: Alert  Orientation Level: Oriented X4  Cognition: Follows commands  Safety/Judgement: Awareness of environment  Functional Mobility Training:  Bed Mobility:  Rolling: Supervision  Supine to Sit: Supervision  Sit to Supine: Contact guard assistance  Transfers:  Sit to Stand: Stand-by assistance  Stand to Sit: Supervision  Balance:  Sitting: Intact  Standing: Intact; With support  Standing - Static: Good  Standing - Dynamic : Fair  Ambulation/Gait Training:  Distance (ft): 250 Feet (ft)  Assistive Device: Gait belt;Walker, rolling  Ambulation - Level of Assistance: Stand-by assistance;Contact guard assistance  Gait Description (WDL): Exceptions to WDL  Gait Abnormalities: Decreased step clearance  Base of Support: Narrowed  Speed/Tangela: Slow  Step Length: Left shortened;Right shortened  Pain:  Pain Scale 1: Numeric (0 - 10)  Pain Intensity 1: 0   Pain out: 0  Activity Tolerance:   Good  Please refer to the flowsheet for vital signs taken during this treatment.   After treatment:   [] Patient left in no apparent distress sitting up in chair  [x] Patient left in no apparent distress in bed  [x] Call bell left within reach  [x] Nursing notified  [] Caregiver present  [] Bed alarm activated      Pamella Mcrae PTA   Time Calculation: 40 mins

## 2018-07-26 NOTE — PROGRESS NOTES
Problem: Afib: Discharge Outcomes (not in CAT)  Goal: *Lungs clear or at baseline  Outcome: Progressing Towards Goal  Patient on room air, no s/s of distress  Goal: *Optimal pain control at patient's stated goal  Outcome: Progressing Towards Goal  No c/o pain

## 2018-07-27 ENCOUNTER — HOME HEALTH ADMISSION (OUTPATIENT)
Dept: HOME HEALTH SERVICES | Facility: HOME HEALTH | Age: 83
End: 2018-07-27
Payer: MEDICARE

## 2018-07-27 LAB
INR PPP: 2.5 (ref 0.8–1.2)
PROTHROMBIN TIME: 26.2 SEC (ref 11.5–15.2)

## 2018-07-27 PROCEDURE — 74011250637 HC RX REV CODE- 250/637: Performed by: PSYCHIATRY & NEUROLOGY

## 2018-07-27 PROCEDURE — 65660000000 HC RM CCU STEPDOWN

## 2018-07-27 PROCEDURE — 85610 PROTHROMBIN TIME: CPT | Performed by: PSYCHIATRY & NEUROLOGY

## 2018-07-27 PROCEDURE — 36415 COLL VENOUS BLD VENIPUNCTURE: CPT | Performed by: PSYCHIATRY & NEUROLOGY

## 2018-07-27 PROCEDURE — 74011250637 HC RX REV CODE- 250/637: Performed by: INTERNAL MEDICINE

## 2018-07-27 RX ORDER — WARFARIN 1 MG/1
1 TABLET ORAL ONCE
Status: COMPLETED | OUTPATIENT
Start: 2018-07-27 | End: 2018-07-27

## 2018-07-27 RX ADMIN — Medication 10 ML: at 15:50

## 2018-07-27 RX ADMIN — ATORVASTATIN CALCIUM 80 MG: 20 TABLET, FILM COATED ORAL at 09:11

## 2018-07-27 RX ADMIN — FAMOTIDINE 20 MG: 20 TABLET ORAL at 09:11

## 2018-07-27 RX ADMIN — WARFARIN SODIUM 1 MG: 1 TABLET ORAL at 18:11

## 2018-07-27 NOTE — PROGRESS NOTES
Transition of care: anticipate dc home Met with akilant at bedside she lives with her son he will pick her up when d/c. torsten will need follow up appointment for Monday for INR. CMS aware. Patient has a walker in the attic at home but not sure it would be suited for her. Patient has requested to use [facility] for [address] per foc. See orders Care Management Interventions PCP Verified by CM: Yes Transition of Care Consult (CM Consult): Home Health [address] [place] [address] Road: Yes Physical Therapy Consult: Yes Occupational Therapy Consult: Yes Speech Therapy Consult: Yes Current Support Network: Lives with Caregiver Confirm Follow Up Transport: Family Plan discussed with Pt/Family/Caregiver: Yes Freedom of Choice Offered: Yes Discharge Location Discharge Placement: Home with home health

## 2018-07-27 NOTE — ROUTINE PROCESS
Shift summary: 
0855:[facility] completed with EDITH Contreras RN. 1945: [facility] completed with [facility] Bedside and Verbal shift change report given to [facility] (oncoming nurse) by Bonita Pina RN (offgoing nurse). Report included the following information SBAR, Kardex, Procedure Summary, Intake/Output, MAR, Accordion and Recent Results.

## 2018-07-27 NOTE — PROGRESS NOTES
Shift Progress Note:  Assumed care of patient in bed awake and quiet, no complaints offered, call bell within reach, VSS,status unchanged throughout shift, uneventful night.   Patient Vitals for the past 12 hrs:   Temp Pulse Resp BP SpO2   07/27/18 0312 97.7 °F (36.5 °C) 61 17 144/85 98 %   07/27/18 0002 98.6 °F (37 °C) 87 16 140/70 97 %   07/26/18 1932 97.9 °F (36.6 °C) 78 15 131/81 98 %

## 2018-07-27 NOTE — PROGRESS NOTES
Pharmacy Dosing Services: Warfarin Consult for Warfarin Dosing by Pharmacy by Dr. Laya Olivares Consult provided for this 80 y.o.  female , for indication of stroke Dose to achieve an INR goal of 2-3 Order entered for  Warfarin 1 mg  to be given today at 18:00. LABS   
PT/INR Lab Results Component Value Date/Time INR 2.5 (H) 07/27/2018 04:28 AM  
  
Platelets Lab Results Component Value Date/Time PLATELET 928 87/38/0468 03:28 AM  
  
H/H Lab Results Component Value Date/Time HGB 13.2 07/26/2018 03:28 AM  
  
 
Warfarin Administrations (last 168 hours) Date/Time Action Medication Dose  
 
 07/26/18 1829 Given  
 warfarin (COUMADIN) tablet 0.5 mg 0.5 mg  
 07/25/18 1736 Given  
 warfarin (COUMADIN) tablet 0.5 mg 0.5 mg  
 07/24/18 1942 Given  
 warfarin (COUMADIN) tablet 1.5 mg 1 mg  
 07/23/18 1832 Given  
 warfarin (COUMADIN) tablet 2 mg 2 mg  
 07/22/18 1859 Given  
 warfarin (COUMADIN) tablet 3 mg 3 mg  
 07/21/18 1709 Given  
 warfarin (COUMADIN) tablet 7.5 mg 7.5 mg  
 07/20/18 1743 Given  
 warfarin (COUMADIN) tablet 5 mg 5 mg Pharmacy to follow daily and will provide subsequent Warfarin dosing based on clinical status. JOSÉ Garcia  Contact information  [phone]

## 2018-07-27 NOTE — PROGRESS NOTES
Problem: Mobility Impaired (Adult and Pediatric) Goal: *Acute Goals and Plan of Care (Insert Text) Physical Therapy Goals Initiated 7/19/2018 and to be accomplished within 3-7 day(s) 1. Patient will move from supine to sit and sit to supine  in bed with supervision/set-up. 2.  Patient will transfer from bed to chair and chair to bed with supervision/set-up using the least restrictive device. 3.  Patient will perform sit to stand with supervision/set-up. 4.  Patient will ambulate with supervision/set-up for 150 feet with the least restrictive device. 5.  Patient will ascend/descend 6 stairs with B handrail(s) with minimal assistance/contact guard assist.  
 
Physical Therapy Goals Initiated 7/26/2018 and to be accomplished within 7 day(s) 1. Patient will move from supine <> sit with Mod I in prep for out of bed activity and change of position. 2.  Patient will perform sit<> stand with Mod I with LRAD in prep for transfers/ambulation. 3.  Patient will transfer from bed <> chair with Mod I with LRAD for time up in chair for completion of ADL activity. 4.  Patient will ambulate 150 feet with LRAD/Mod I for improved functional mobility/safe discharge. 5.  Patient will ascend/descend 6 stairs with B handrail(s) with contact guard assist for home re-entry as needed. Outcome: Not Progressing Towards Goal 
Pt refused PT due to: 
[x]  Family visiting from [address] [place] [address] []  Eating 
[]  Pain 
[]  Pt lethargic 
[]  Off Unit Pt is mobilizing well Will f/u tomorrow. Thank you.  
Joseph Mendoza PTA

## 2018-07-27 NOTE — PROGRESS NOTES
Problem: Mobility Impaired (Adult and Pediatric) Goal: *Acute Goals and Plan of Care (Insert Text) Physical Therapy Goals Initiated 7/19/2018 and to be accomplished within 3-7 day(s) 1. Patient will move from supine to sit and sit to supine  in bed with supervision/set-up. 2.  Patient will transfer from bed to chair and chair to bed with supervision/set-up using the least restrictive device. 3.  Patient will perform sit to stand with supervision/set-up. 4.  Patient will ambulate with supervision/set-up for 150 feet with the least restrictive device. 5.  Patient will ascend/descend 6 stairs with B handrail(s) with minimal assistance/contact guard assist.  
 
Physical Therapy Goals Initiated 7/26/2018 and to be accomplished within 7 day(s) 1. Patient will move from supine <> sit with Mod I in prep for out of bed activity and change of position. 2.  Patient will perform sit<> stand with Mod I with LRAD in prep for transfers/ambulation. 3.  Patient will transfer from bed <> chair with Mod I with LRAD for time up in chair for completion of ADL activity. 4.  Patient will ambulate 150 feet with LRAD/Mod I for improved functional mobility/safe discharge. 5.  Patient will ascend/descend 6 stairs with B handrail(s) with contact guard assist for home re-entry as needed. Outcome: Not Progressing Towards Goal 
Pt refused PT due to: 
[x]  I don't feel like doing therapy today\" [x]  Eating 
[]  Pain 
[]  Pt lethargic 
[]  Off Unit Will f/u later, as pt's schedule allows. Thank you.  
Julieta Penny, ANDRE

## 2018-07-27 NOTE — ROUTINE PROCESS
Bedside and Verbal shift change report given to Eros (oncoming nurse) by Miriam (offgoing nurse). Report included the following information SBAR, Kardex, Intake/Output, MAR and Recent Results. No changes in neurological status, uneventful shift.

## 2018-07-27 NOTE — PROGRESS NOTES
Hospitalist Progress Note Patient: Brittaney Cervantes MRN: [id number]  CSN: [id number] YOB: 1930  Age: 80 y.o. Sex: female DOA: 7/18/2018 LOS:  LOS: 9 days Chief Complaint: 
Acute CVA Assessment/Plan Acute CVA's 
afib with slow vent response CAD Weakness Poor appetite Possibly mild depression/dementia Coumadin therapy INR stable D/c plan is for home tomorrow if stable Home PT Not on beta blocker or antihypertensive EF 70% TSH was wnl Continue current plans Consider SSRI as outpatient Disposition : 
Patient Active Problem List  
Diagnosis Code  Abdominal pain, other specified site R10.9  Atrial fibrillation (HCC) I48.91  
 Constipation K59.00  
 Encephalopathy, unspecified G93.40  Abdominal wall hematoma S30. [place]  CAD (coronary artery disease) I25.10  Hypercholesterolemia E78.00  Hypertension I10  
 Hx of myocardial infarction I25.2  Elevated INR R79.1  Atrial fibrillation with slow ventricular response (HCC) I48.91  
 SSS (sick sinus syndrome) ([facility]) I49.5  Acute CVA (cerebrovascular accident) ([address] I63.9 Subjective: 
 
Denies new issue Denies abd pain, N/V/CP or SOB Review of systems: 
 
Constitutional: denies fevers, chills, myalgias Respiratory: denies SOB, cough Cardiovascular: denies chest pain, palpitations Gastrointestinal: denies nausea, vomiting, diarrhea Vital signs/Intake and Output: 
Visit Vitals  /65 (BP 1 Location: Left arm, BP Patient Position: At rest)  Pulse (!) 57  Temp 98.4 °F (36.9 °C)  Resp 18  Ht 5' (1.524 m)  Wt 69 kg (152 lb 3.2 oz)  SpO2 98%  BMI 29.72 kg/m2 Current Shift:  07/27 0701 - 07/27 1900 In: 240 [P.O.:240] Out: 400 [Urine:400] Last three shifts:  07/25 1901 - 07/27 0700 In: 240 [P.O.:240] Out: 1350 [ITOOY:8027] Exam: 
 
General: frail elderly WFalert, NAD, OX3 Head/Neck: NCAT, supple, No masses, No lymphadenopathy CVS:irreg rhythm, reg rate, no M/R/G, S1/S2 heard, no thrill Lungs:Clear to auscultation bilaterally, no wheezes, rhonchi, or rales Abdomen: Soft, Nontender, No distention, Normal Bowel sounds, No hepatomegaly Extremities: No C/C/E, pulses palpable 2+ Skin:normal texture and turgor, no rashes, no lesions Neuro:grossly normal , follows commands Psych:appropriate Labs: Results:  
   
Chemistry Recent Labs  
   07/26/18 
 2861  07/25/18 
 8827 GLU  106*  105* NA  140  138  
K  3.7  3.5 CL  107  104 CO2  24  26 BUN  8  8 CREA  0.90  0.95 CA  9.2  9.1 AGAP  9  8 BUCR  9*  8*  
  
CBC w/Diff Recent Labs  
   07/26/18 
 0328  07/25/18 
 0420 WBC  6.0  5.0  
RBC  4.50  4.39  
HGB  13.2  12.8 HCT  40.9  40.0 PLT  222  210 GRANS  61  58 LYMPH  27  33 EOS  3  2 Cardiac Enzymes No results for input(s): CPK, CKND1, ZACARIAS in the last 72 hours. No lab exists for component: Priyank Minium Coagulation Recent Labs  
   07/27/18 
 0428  07/26/18 
 0328 PTP  26.2*  30.4* INR  2.5*  3.0* Lipid Panel Lab Results Component Value Date/Time Cholesterol, total 200 (H) 07/19/2018 04:13 AM  
 HDL Cholesterol 62 (H) 07/19/2018 04:13 AM  
 LDL, calculated 119.4 (H) 07/19/2018 04:13 AM  
 VLDL, calculated 18.6 07/19/2018 04:13 AM  
 Triglyceride 93 07/19/2018 04:13 AM  
 CHOL/HDL Ratio 3.2 07/19/2018 04:13 AM  
  
BNP No results for input(s): BNPP in the last 72 hours. Liver Enzymes No results for input(s): TP, ALB, TBIL, AP, SGOT, GPT in the last 72 hours. No lab exists for component: DBIL Thyroid Studies Lab Results Component Value Date/Time TSH 1.50 07/19/2018 04:13 AM  
    
Procedures/imaging: see electronic medical records for all procedures/Xrays and details which were not copied into this note but were reviewed prior to creation of Plan [place] Byes, MD

## 2018-07-28 VITALS
HEIGHT: 60 IN | TEMPERATURE: 97.5 F | SYSTOLIC BLOOD PRESSURE: 133 MMHG | BODY MASS INDEX: 29.88 KG/M2 | RESPIRATION RATE: 16 BRPM | DIASTOLIC BLOOD PRESSURE: 63 MMHG | OXYGEN SATURATION: 96 % | WEIGHT: 152.2 LBS | HEART RATE: 66 BPM

## 2018-07-28 LAB
ATRIAL RATE: 74 BPM
CALCULATED R AXIS, ECG10: -32 DEGREES
CALCULATED T AXIS, ECG11: 133 DEGREES
DIAGNOSIS, 93000: NORMAL
INR PPP: 1.8 (ref 0.8–1.2)
PROTHROMBIN TIME: 20.3 SEC (ref 11.5–15.2)
Q-T INTERVAL, ECG07: 472 MS
QRS DURATION, ECG06: 90 MS
QTC CALCULATION (BEZET), ECG08: 398 MS
VENTRICULAR RATE, ECG03: 43 BPM

## 2018-07-28 PROCEDURE — 74011250637 HC RX REV CODE- 250/637: Performed by: INTERNAL MEDICINE

## 2018-07-28 PROCEDURE — 85610 PROTHROMBIN TIME: CPT | Performed by: PSYCHIATRY & NEUROLOGY

## 2018-07-28 PROCEDURE — 36592 COLLECT BLOOD FROM PICC: CPT

## 2018-07-28 PROCEDURE — 74011250637 HC RX REV CODE- 250/637: Performed by: PSYCHIATRY & NEUROLOGY

## 2018-07-28 PROCEDURE — 77010033678 HC OXYGEN DAILY

## 2018-07-28 PROCEDURE — 97116 GAIT TRAINING THERAPY: CPT

## 2018-07-28 RX ORDER — WARFARIN 2 MG/1
2 TABLET ORAL DAILY
Qty: 30 TAB | Refills: 1 | Status: SHIPPED | OUTPATIENT
Start: 2018-07-28

## 2018-07-28 RX ORDER — WARFARIN 1 MG/1
2 TABLET ORAL ONCE
Status: DISCONTINUED | OUTPATIENT
Start: 2018-07-28 | End: 2018-07-28 | Stop reason: HOSPADM

## 2018-07-28 RX ORDER — ATORVASTATIN CALCIUM 80 MG/1
80 TABLET, FILM COATED ORAL DAILY
Qty: 30 TAB | Refills: 2 | Status: SHIPPED | OUTPATIENT
Start: 2018-07-29

## 2018-07-28 RX ADMIN — FAMOTIDINE 20 MG: 20 TABLET ORAL at 09:35

## 2018-07-28 RX ADMIN — Medication 10 ML: at 06:00

## 2018-07-28 RX ADMIN — ATORVASTATIN CALCIUM 80 MG: 20 TABLET, FILM COATED ORAL at 09:34

## 2018-07-28 NOTE — PROGRESS NOTES
Pharmacy Dosing Services: Warfarin Consult for Warfarin Dosing by Pharmacy by Dr. Fede Aviles Consult provided for this 80 y.o.  female , for indication of stroke Dose to achieve an INR goal of 2-3 INR = 1.8  (decreased from 2.5 on 7/27/18) Order entered for  Warfarin 2 mg to be given today at 18:00. Significant drug interactions: none LABS   
PT/INR Lab Results Component Value Date/Time INR 1.8 (H) 07/28/2018 05:00 AM  
  
Platelets Lab Results Component Value Date/Time PLATELET 827 83/37/8809 03:28 AM  
  
H/H Lab Results Component Value Date/Time HGB 13.2 07/26/2018 03:28 AM  
  
 
Warfarin Administrations (last 168 hours) Date/Time Action Medication Dose  
 
 07/27/18 1811 Given  
 warfarin (COUMADIN) tablet 1 mg 1 mg  
 07/26/18 1829 Given  
 warfarin (COUMADIN) tablet 0.5 mg 0.5 mg  
 07/25/18 1736 Given  
 warfarin (COUMADIN) tablet 0.5 mg 0.5 mg  
 07/24/18 1942 Given  
 warfarin (COUMADIN) tablet 1.5 mg 1 mg  
 07/23/18 1832 Given  
 warfarin (COUMADIN) tablet 2 mg 2 mg  
 07/22/18 1859 Given  
 warfarin (COUMADIN) tablet 3 mg 3 mg  
 07/21/18 1709 Given  
 warfarin (COUMADIN) tablet 7.5 mg 7.5 mg Pharmacy to follow daily and will provide subsequent Warfarin dosing based on clinical status. JOSÉ Irene  Contact information [phone]

## 2018-07-28 NOTE — PROGRESS NOTES
1215 Removed right upper arm double lumen picc line. Patient tolerated well, vital signs stable. Catheter tip measured 35 cm, tip intact, no signs of infection or bleeding. Patient instructed to remain flat until [id number], patient verbalized understanding. Will continue to monitor.

## 2018-07-28 NOTE — PROGRESS NOTES
Problem: Mobility Impaired (Adult and Pediatric) Goal: *Acute Goals and Plan of Care (Insert Text) Physical Therapy Goals Initiated 7/19/2018 and to be accomplished within 3-7 day(s) 1. Patient will move from supine to sit and sit to supine  in bed with supervision/set-up. 2.  Patient will transfer from bed to chair and chair to bed with supervision/set-up using the least restrictive device. 3.  Patient will perform sit to stand with supervision/set-up. 4.  Patient will ambulate with supervision/set-up for 150 feet with the least restrictive device. 5.  Patient will ascend/descend 6 stairs with B handrail(s) with minimal assistance/contact guard assist.  
 
Physical Therapy Goals Initiated 7/26/2018 and to be accomplished within 7 day(s) 1. Patient will move from supine <> sit with Mod I in prep for out of bed activity and change of position. 2.  Patient will perform sit<> stand with Mod I with LRAD in prep for transfers/ambulation. 3.  Patient will transfer from bed <> chair with Mod I with LRAD for time up in chair for completion of ADL activity. 4.  Patient will ambulate 150 feet with LRAD/Mod I for improved functional mobility/safe discharge. 5.  Patient will ascend/descend 6 stairs with B handrail(s) with contact guard assist for home re-entry as needed. Outcome: Progressing Towards Goal 
physical Therapy TREATMENT Patient: Arik Andre (86 y.o. female) Date: 7/28/2018 Diagnosis: Atrial fibrillation with slow ventricular response (HCC) Acute CVA (cerebrovascular accident) ([facility] [address] Precautions: Fall Chart, physical therapy assessment, plan of care and goals were reviewed. ASSESSMENT: 
Pt is able to to address all areas of mobility needed for home mobility. Pt remains safe, only needing CGA for stair amb. Pt is safe for supervised home return. Son has been present for prior treatments and is aware of pt's functional level.    
Progression toward goals: 
[] Improving appropriately and progressing toward goals [x]      Improving slowly and progressing toward goals 
[]      Not making progress toward goals and plan of care will be adjusted PLAN: 
Patient continues to benefit from skilled intervention to address the above impairments. Continue treatment per established plan of care. Discharge Recommendations:  Home Health Further Equipment Recommendations for Discharge:   rolling walker SUBJECTIVE:  
Patient stated I'm going home today.  OBJECTIVE DATA SUMMARY:  
Critical Behavior: 
Neurologic State: Alert, Appropriate for age, Eyes open spontaneously Orientation Level: Oriented X4 Cognition: Follows commands, Recognition of people/places Safety/Judgement: Awareness of environment Functional Mobility Training: 
Bed Mobility: 
Rolling: Supervision Supine to Sit: Supervision Transfers: 
Sit to Stand: Stand-by assistance Stand to Sit: Stand-by assistance Balance: 
Sitting: Intact Standing: Intact; With support Standing - Static: Good Standing - Dynamic : Fair Ambulation/Gait Training: 
Distance (ft): 200 Feet (ft) Assistive Device: Gait belt;Walker, rolling Ambulation - Level of Assistance: Supervision;Stand-by assistance Gait Abnormalities: Decreased step clearance Base of Support: Narrowed Speed/Tangela: Slow Step Length: Left shortened;Right shortened Stairs: 
Number of Stairs Trained: 5 (U/D) Stairs - Level of Assistance: CGA Rail Use: Right Pain: 
Pain Scale 1: Numeric (0 - 10) Pain Intensity 1: 0 Pain out: 0 Activity Tolerance:  
Good Please refer to the flowsheet for vital signs taken during this treatment. After treatment:  
[] Patient left in no apparent distress sitting up in chair 
[x] Patient left in no apparent distress in bed 
[x] Call bell left within reach [x] Nursing notified 
[] Caregiver present 
[] Bed alarm activated Carmen Stroud PTA Time Calculation: 34 mins

## 2018-07-28 NOTE — DISCHARGE SUMMARY
[address]    Jen Kumar  MR#: [id number]  : 1930  ACCOUNT #: [de-identified]   ADMIT DATE: 2018  DISCHARGE DATE:     DIAGNOSES AT DISCHARGE:  1.  Acute strokes. 2.  Atrial fibrillation with slow ventricular response. 3.  History of hypertension. 4.  History of coronary disease. 5.  Chronic atrial fibrillation. 6.  Generalized weakness, improving. 7.  Mild depression, possible mild dementia. CONSULTANTS:  Dr. Levon Bustamante, cardiology; Dr. Selam Stark, neurology; Dr. Jake Arenas, critical care pulmonology; Dr. Nunu Sorensen, Cardiology. HOSPITAL SUMMARY:  The patient is 80years old. She lives at home. She was admitted to the hospital on . When she came into the hospital, she had bradycardia, left-sided weakness, decreased sensation and dysarthric speech. She was diagnosed with a stroke. She was admitted to the intensive care unit. She was noted to be in afib. She is supposed to be on anticoagulation, but her INR was subtherapeutic. It is not certain that she was taking the Coumadin at home. CODE S was called from the ER. She was placed on dopamine for low blood pressure and again, admitted to the ICU. She has a history of high cholesterol, hypertension, irritable bowel syndrome, afib, coronary disease, arthritis. She has had a previous cholecystectomy, knee replacement. SOCIAL HISTORY:  , nonsmoker, 1 glass of wine per week. Lives with her son. Patient has had an extensive hospital course. She has been seen by critical care pulmonology and neurology and cardiology as noted. She had an MRI of her brain that was completed on  that showed multifocal small areas of acute ischemic infarct in the right MCA territory and progressive moderate bilateral deep white matter changes. She had an echocardiogram completed on  that showed an EF of greater than 70%. It was hyperdynamic.   She had trileaflet aortic valve sclerosis and moderate tricuspid valve regurgitation. Moderate pulmonary hypertension was noted. A TSH was checked, it was within normal limits. There was no evidence for infectious etiology contributory. She had management of her slow ventricular response afib by cardiology, who recommended holding any calcium or channel blockers or beta blockers. She was weaned off the dopamine. She was placed on Coumadin at the discretion of neurology and cardiology. Permissive hypertension was allowed. There was a thrombus seen on the CTA, but she is not a candidate for intervention. It was discussed with neuro interventionalist at [facility]. 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 Again, that was reviewed with neuro interventional and she was determined not to be a candidate. Her CBC is within normal limits from the 22nd to the 26th. Basic metabolic panel unremarkable. She is not a diabetic. Hemoglobin A1c was 5%. Total cholesterol 200, . Her Lipitor has been increased to 80 mg. She has been participating with physical therapy the last number of days, transferred out of the ICU 3 days ago. She has passed her PT test and actually done stairs this morning. She is anxious to go home. She has a poor appetite in general.  She says it has been like that for some time. At 80years old, she is rather frail and debilitated, but she is ambulating with a walker at this point and is mentating reasonably well. She denies chest pain, palpitations, headache and shortness of breath this morning. She is anxious to go home. No focal deficits noted on neurologic exam.    PHYSICAL EXAMINATION:  VITAL SIGNS:  Temp is 97.9, pulse 98, blood pressure 146/76, respiratory rate 18. SaO2 is 96%. CARDIAC:  Irregular rhythm, regular rate. LUNGS:  Clear bilaterally. ABDOMEN:  Obese, nontender. LOWER EXTREMITIES:  No pitting edema.     PLAN:  Discharged home on the Lipitor 80 mg nightly. Resume her Cozaar 100 mg daily and Coumadin 2 mg nightly. Her INR has come down a little bit today to 1.8; it was 2.5 yesterday. Her next INR is due on Monday. It can be done at Dr. Manolo Medley office, and her followup is to be with Dr. Cecy Leggett in the office on Monday as they were unable to schedule an appointment Friday from the hospital as the office was closed. Her son knows this. He is going to take her to see Dr. Cecy Leggett on Monday. She will continue with home PT and OT. Fall precautions. Coumadin therapy. Neurology and cardiology have both signed off at this point in time. The last recommendations from neurology were to continue the Lipitor, stop the aspirin, continue Coumadin, avoid hypotension. DISPOSITION:  She is stable for transfer to home today. 40 minutes on discharge time.       Andi Dent MD       RI/JAQUAN  D: 07/28/2018 10:32     T: 07/28/2018 11:26  JOB #: [id number]  CC: Tanvir Reynolds MD

## 2018-07-28 NOTE — DISCHARGE INSTRUCTIONS
DISCHARGE SUMMARY from Nurse    PATIENT INSTRUCTIONS:    What to do at Home:    Recommended activity: Activity as tolerated. Please follow up with Primary care physician. *  Please give a list of your current medications to your Primary Care Provider. *  Please update this list whenever your medications are discontinued, doses are      changed, or new medications (including over-the-counter products) are added. *  Please carry medication information at all times in case of emergency situations. These are general instructions for a healthy lifestyle:    No smoking/ No tobacco products/ Avoid exposure to second hand smoke  Surgeon General's Warning:  Quitting smoking now greatly reduces serious risk to your health. Obesity, smoking, and sedentary lifestyle greatly increases your risk for illness    A healthy diet, regular physical exercise & weight monitoring are important for maintaining a healthy lifestyle    You may be retaining fluid if you have a history of heart failure or if you experience any of the following symptoms:  Weight gain of 3 pounds or more overnight or 5 pounds in a week, increased swelling in our hands or feet or shortness of breath while lying flat in bed. Please call your doctor as soon as you notice any of these symptoms; do not wait until your next office visit. Recognize signs and symptoms of STROKE:    F-face looks uneven    A-arms unable to move or move unevenly    S-speech slurred or non-existent    T-time-call 911 as soon as signs and symptoms begin-DO NOT go       Back to bed or wait to see if you get better-TIME IS BRAIN. Warning Signs of HEART ATTACK     Call 911 if you have these symptoms:   Chest discomfort. Most heart attacks involve discomfort in the center of the chest that lasts more than a few minutes, or that goes away and comes back. It can feel like uncomfortable pressure, squeezing, fullness, or pain.    Discomfort in other areas of the upper body. Symptoms can include pain or discomfort in one or both arms, the back, neck, jaw, or stomach.  Shortness of breath with or without chest discomfort.  Other signs may include breaking out in a cold sweat, nausea, or lightheadedness. Don't wait more than five minutes to call 911 - MINUTES MATTER! Fast action can save your life. Calling 911 is almost always the fastest way to get lifesaving treatment. Emergency Medical Services staff can begin treatment when they arrive -- up to an hour sooner than if someone gets to the hospital by car. The discharge information has been reviewed with the patient. The patient verbalized understanding. Discharge medications reviewed with the patient and appropriate educational materials and side effects teaching were provided.     Patient armband removed and shredded        ___________________________________________________________________________________________________________________________________

## 2018-07-28 NOTE — ROUTINE PROCESS
Bedside and Verbal shift change report given to [address] [place] [address] (oncoming nurse) by Suzette Montero RN 
 (offgoing nurse). Report included the following information SBAR, Kardex, Intake/Output, MAR and Recent Results.

## 2018-07-28 NOTE — PROGRESS NOTES
Shift Summary Note: Assumed care of patient in bed resting in no apparent distress, Uneventful night with patient sleeping unlesse assessed. Call bell remains within reach. Patient Vitals for the past 12 hrs: 
 Temp Pulse Resp BP SpO2  
07/28/18 0338 97.9 °F (36.6 °C) 62 18 151/78 99 % 07/27/18 2355 98.1 °F (36.7 °C) 70 18 136/60 98 %

## 2018-07-28 NOTE — PROGRESS NOTES
DC Plan:  Discharge home today with [facility], pts son Rene Plummer to drive her home Chart accessed as CM on call. Chart reviewed. Discharge order noted. Per previous CM notes, pt arranged with [facility]. Per Yissel De Oliveira in PT, pt needs RW. Met with pt at bedside. States son has been unable to locate a RW for her. Pt denies receiving one in last 5 years through insurance. RW delivered to pts bedside. Care Management Interventions PCP Verified by CM: Yes Transition of Care Consult (CM Consult): Home Health [address] Yes Physical Therapy Consult: Yes Occupational Therapy Consult: Yes Speech Therapy Consult: Yes Current Support Network: Lives with Caregiver Confirm Follow Up Transport: Family Plan discussed with Pt/Family/Caregiver: Yes Freedom of Choice Offered: Yes Discharge Location Discharge Placement: Home with home health

## 2018-07-30 ENCOUNTER — PATIENT OUTREACH (OUTPATIENT)
Dept: CASE MANAGEMENT | Age: 83
End: 2018-07-30

## 2018-07-30 ENCOUNTER — HOME CARE VISIT (OUTPATIENT)
Dept: SCHEDULING | Facility: HOME HEALTH | Age: 83
End: 2018-07-30
Payer: MEDICARE

## 2018-07-30 VITALS
HEART RATE: 79 BPM | RESPIRATION RATE: 17 BRPM | SYSTOLIC BLOOD PRESSURE: 110 MMHG | DIASTOLIC BLOOD PRESSURE: 70 MMHG | TEMPERATURE: 97.4 F

## 2018-07-30 PROCEDURE — 3331090001 HH PPS REVENUE CREDIT

## 2018-07-30 PROCEDURE — 400013 HH SOC

## 2018-07-30 PROCEDURE — 3331090002 HH PPS REVENUE DEBIT

## 2018-07-30 PROCEDURE — G0299 HHS/HOSPICE OF RN EA 15 MIN: HCPCS

## 2018-07-30 PROCEDURE — G0151 HHCP-SERV OF PT,EA 15 MIN: HCPCS

## 2018-07-30 NOTE — PROGRESS NOTES
Hospital Discharge Follow-Up Date/Time:  2018 11:49 AM 
 
Patient was admitted to [facility] on 18 and discharged on 18 for acute CVA. The physician discharge summary was available at the time of outreach. Patient was contacted within 3 business days of discharge. Inpatient RRAT score: 20 Was this a readmission? no  
Patient stated reason for the readmission: NA 
 
Nurse Navigator (NN) contacted the patient's son, Hamida Collins by telephone to perform post hospital discharge assessment. Verified name and  with Hamida Collins as identifiers. Provided introduction to self, and explanation of the Nurse Navigator role. Reviewed discharge instructions and red flags with Hamida Collins who verbalized understanding. Hamida Collins given an opportunity to ask questions and does not have any further questions or concerns at this time. Hamida Collins agrees to contact the PCP office for questions related to the patient's healthcare. NN provided contact information for future reference. Disease Specific:   N/A Summary of patient's top problems: 1. CVA Home Health orders at discharge: SN/PT/OT Home Health company: [employer] Date of initial visit: Hamida Collins states that SN  \"is coming this afternoon\". Durable Medical Equipment ordered/company: NA 
Durable Medical Equipment received: has rolling walker Barriers to care? None noted at this time, patient's son, daughter and granddaughter are caregivers at this time. Advance Care Planning:  
Does patient have an Advance Directive:  on file Medication(s):  
New Medications at Discharge: no 
Changed Medications at Discharge: yes Discontinued Medications at Discharge: no 
 
Medication reconciliation was performed with Hamida Collins, who verbalizes understanding of administration of home medications. There were no barriers to obtaining medications identified at this time. Referral to Pharm D needed: no  
 
Current Outpatient Prescriptions Medication Sig  warfarin (COUMADIN) 2 mg tablet Take 1 Tab by mouth daily.  atorvastatin (LIPITOR) 80 mg tablet Take 1 Tab by mouth daily.  losartan (COZAAR) 100 mg tablet Take 100 mg by mouth daily.  latanoprost (XALATAN) 0.005 % ophthalmic solution Administer 1 Drop to both eyes nightly.  omeprazole (PRILOSEC) 20 mg capsule Take 20 mg by mouth daily. No current facility-administered medications for this visit. There are no discontinued medications. [facility] follow up appointment(s): Future Appointments Date Time Provider Walt Franklin 7/31/2018 To Be Determined Daisy Brar, OTR/L [address] [facility],  [id number] Non-[facility] follow up appointment(s): Bonnie Genao is calling to make follow up appointment with PCP [facility]:  n/a  
 
 
Goals  Attends follow-up appointments as directed.    
     
  Dr. Todd Ramos - PCP

## 2018-07-31 ENCOUNTER — HOME CARE VISIT (OUTPATIENT)
Dept: HOME HEALTH SERVICES | Facility: HOME HEALTH | Age: 83
End: 2018-07-31
Payer: MEDICARE

## 2018-07-31 VITALS
HEART RATE: 73 BPM | SYSTOLIC BLOOD PRESSURE: 126 MMHG | RESPIRATION RATE: 14 BRPM | DIASTOLIC BLOOD PRESSURE: 74 MMHG | TEMPERATURE: 98.4 F | OXYGEN SATURATION: 95 %

## 2018-07-31 PROCEDURE — 3331090002 HH PPS REVENUE DEBIT

## 2018-07-31 PROCEDURE — 3331090001 HH PPS REVENUE CREDIT

## 2018-08-01 PROCEDURE — 3331090002 HH PPS REVENUE DEBIT

## 2018-08-01 PROCEDURE — 3331090001 HH PPS REVENUE CREDIT

## 2018-08-02 ENCOUNTER — HOME CARE VISIT (OUTPATIENT)
Dept: SCHEDULING | Facility: HOME HEALTH | Age: 83
End: 2018-08-02
Payer: MEDICARE

## 2018-08-02 ENCOUNTER — HOME CARE VISIT (OUTPATIENT)
Dept: HOME HEALTH SERVICES | Facility: HOME HEALTH | Age: 83
End: 2018-08-02
Payer: MEDICARE

## 2018-08-02 VITALS
TEMPERATURE: 96.6 F | DIASTOLIC BLOOD PRESSURE: 78 MMHG | HEART RATE: 68 BPM | SYSTOLIC BLOOD PRESSURE: 120 MMHG | OXYGEN SATURATION: 96 %

## 2018-08-02 PROCEDURE — 3331090002 HH PPS REVENUE DEBIT

## 2018-08-02 PROCEDURE — 3331090001 HH PPS REVENUE CREDIT

## 2018-08-02 PROCEDURE — G0157 HHC PT ASSISTANT EA 15: HCPCS

## 2018-08-03 ENCOUNTER — HOME CARE VISIT (OUTPATIENT)
Dept: SCHEDULING | Facility: HOME HEALTH | Age: 83
End: 2018-08-03
Payer: MEDICARE

## 2018-08-03 VITALS
HEART RATE: 63 BPM | SYSTOLIC BLOOD PRESSURE: 116 MMHG | TEMPERATURE: 96.4 F | DIASTOLIC BLOOD PRESSURE: 62 MMHG | OXYGEN SATURATION: 96 %

## 2018-08-03 PROCEDURE — G0152 HHCP-SERV OF OT,EA 15 MIN: HCPCS

## 2018-08-03 PROCEDURE — 3331090001 HH PPS REVENUE CREDIT

## 2018-08-03 PROCEDURE — G0157 HHC PT ASSISTANT EA 15: HCPCS

## 2018-08-03 PROCEDURE — 3331090002 HH PPS REVENUE DEBIT

## 2018-08-03 PROCEDURE — G0495 RN CARE TRAIN/EDU IN HH: HCPCS

## 2018-08-04 VITALS
DIASTOLIC BLOOD PRESSURE: 65 MMHG | SYSTOLIC BLOOD PRESSURE: 111 MMHG | RESPIRATION RATE: 14 BRPM | OXYGEN SATURATION: 95 % | HEART RATE: 51 BPM | TEMPERATURE: 97.2 F

## 2018-08-04 VITALS — SYSTOLIC BLOOD PRESSURE: 116 MMHG | DIASTOLIC BLOOD PRESSURE: 62 MMHG | OXYGEN SATURATION: 97 % | HEART RATE: 72 BPM

## 2018-08-04 PROCEDURE — 3331090002 HH PPS REVENUE DEBIT

## 2018-08-04 PROCEDURE — 3331090001 HH PPS REVENUE CREDIT

## 2018-08-05 PROCEDURE — 3331090002 HH PPS REVENUE DEBIT

## 2018-08-05 PROCEDURE — 3331090001 HH PPS REVENUE CREDIT

## 2018-08-06 ENCOUNTER — HOME CARE VISIT (OUTPATIENT)
Dept: SCHEDULING | Facility: HOME HEALTH | Age: 83
End: 2018-08-06
Payer: MEDICARE

## 2018-08-06 VITALS
SYSTOLIC BLOOD PRESSURE: 117 MMHG | DIASTOLIC BLOOD PRESSURE: 82 MMHG | OXYGEN SATURATION: 95 % | HEART RATE: 59 BPM | RESPIRATION RATE: 14 BRPM | TEMPERATURE: 97.5 F

## 2018-08-06 LAB
INR BLD: 2.8 (ref 0.9–1.1)
PT POC: 33.3 SECONDS (ref 11.8–14.9)

## 2018-08-06 PROCEDURE — G0495 RN CARE TRAIN/EDU IN HH: HCPCS

## 2018-08-06 PROCEDURE — 3331090002 HH PPS REVENUE DEBIT

## 2018-08-06 PROCEDURE — 3331090001 HH PPS REVENUE CREDIT

## 2018-08-06 PROCEDURE — G0152 HHCP-SERV OF OT,EA 15 MIN: HCPCS

## 2018-08-07 ENCOUNTER — HOME CARE VISIT (OUTPATIENT)
Dept: SCHEDULING | Facility: HOME HEALTH | Age: 83
End: 2018-08-07
Payer: MEDICARE

## 2018-08-07 VITALS
DIASTOLIC BLOOD PRESSURE: 64 MMHG | TEMPERATURE: 96.3 F | OXYGEN SATURATION: 97 % | SYSTOLIC BLOOD PRESSURE: 124 MMHG | HEART RATE: 83 BPM

## 2018-08-07 VITALS — OXYGEN SATURATION: 98 % | SYSTOLIC BLOOD PRESSURE: 116 MMHG | HEART RATE: 84 BPM | DIASTOLIC BLOOD PRESSURE: 75 MMHG

## 2018-08-07 PROCEDURE — 3331090002 HH PPS REVENUE DEBIT

## 2018-08-07 PROCEDURE — G0157 HHC PT ASSISTANT EA 15: HCPCS

## 2018-08-07 PROCEDURE — 3331090001 HH PPS REVENUE CREDIT

## 2018-08-08 ENCOUNTER — HOME CARE VISIT (OUTPATIENT)
Dept: SCHEDULING | Facility: HOME HEALTH | Age: 83
End: 2018-08-08
Payer: MEDICARE

## 2018-08-08 PROCEDURE — 3331090002 HH PPS REVENUE DEBIT

## 2018-08-08 PROCEDURE — 3331090001 HH PPS REVENUE CREDIT

## 2018-08-09 ENCOUNTER — HOME CARE VISIT (OUTPATIENT)
Dept: SCHEDULING | Facility: HOME HEALTH | Age: 83
End: 2018-08-09
Payer: MEDICARE

## 2018-08-09 VITALS
DIASTOLIC BLOOD PRESSURE: 70 MMHG | OXYGEN SATURATION: 95 % | RESPIRATION RATE: 14 BRPM | HEART RATE: 55 BPM | TEMPERATURE: 98.4 F | SYSTOLIC BLOOD PRESSURE: 94 MMHG

## 2018-08-09 VITALS
DIASTOLIC BLOOD PRESSURE: 62 MMHG | OXYGEN SATURATION: 96 % | HEART RATE: 48 BPM | SYSTOLIC BLOOD PRESSURE: 114 MMHG | TEMPERATURE: 96.1 F

## 2018-08-09 PROCEDURE — 3331090002 HH PPS REVENUE DEBIT

## 2018-08-09 PROCEDURE — G0152 HHCP-SERV OF OT,EA 15 MIN: HCPCS

## 2018-08-09 PROCEDURE — G0157 HHC PT ASSISTANT EA 15: HCPCS

## 2018-08-09 PROCEDURE — 3331090001 HH PPS REVENUE CREDIT

## 2018-08-09 PROCEDURE — G0495 RN CARE TRAIN/EDU IN HH: HCPCS

## 2018-08-10 VITALS — SYSTOLIC BLOOD PRESSURE: 114 MMHG | DIASTOLIC BLOOD PRESSURE: 62 MMHG | HEART RATE: 56 BPM | OXYGEN SATURATION: 96 %

## 2018-08-10 PROCEDURE — 3331090001 HH PPS REVENUE CREDIT

## 2018-08-10 PROCEDURE — 3331090002 HH PPS REVENUE DEBIT

## 2018-08-11 PROCEDURE — 3331090002 HH PPS REVENUE DEBIT

## 2018-08-11 PROCEDURE — 3331090001 HH PPS REVENUE CREDIT

## 2018-08-12 PROCEDURE — 3331090001 HH PPS REVENUE CREDIT

## 2018-08-12 PROCEDURE — 3331090002 HH PPS REVENUE DEBIT

## 2018-08-13 ENCOUNTER — HOME CARE VISIT (OUTPATIENT)
Dept: SCHEDULING | Facility: HOME HEALTH | Age: 83
End: 2018-08-13
Payer: MEDICARE

## 2018-08-13 VITALS
TEMPERATURE: 97.3 F | OXYGEN SATURATION: 96 % | HEART RATE: 72 BPM | SYSTOLIC BLOOD PRESSURE: 133 MMHG | RESPIRATION RATE: 14 BRPM | DIASTOLIC BLOOD PRESSURE: 82 MMHG

## 2018-08-13 PROCEDURE — G0495 RN CARE TRAIN/EDU IN HH: HCPCS

## 2018-08-13 PROCEDURE — 3331090002 HH PPS REVENUE DEBIT

## 2018-08-13 PROCEDURE — 3331090001 HH PPS REVENUE CREDIT

## 2018-08-13 PROCEDURE — G0152 HHCP-SERV OF OT,EA 15 MIN: HCPCS

## 2018-08-14 ENCOUNTER — HOME CARE VISIT (OUTPATIENT)
Dept: SCHEDULING | Facility: HOME HEALTH | Age: 83
End: 2018-08-14
Payer: MEDICARE

## 2018-08-14 VITALS
TEMPERATURE: 96.8 F | OXYGEN SATURATION: 96 % | SYSTOLIC BLOOD PRESSURE: 124 MMHG | HEART RATE: 78 BPM | DIASTOLIC BLOOD PRESSURE: 58 MMHG

## 2018-08-14 VITALS — OXYGEN SATURATION: 98 % | HEART RATE: 74 BPM | DIASTOLIC BLOOD PRESSURE: 82 MMHG | SYSTOLIC BLOOD PRESSURE: 133 MMHG

## 2018-08-14 PROCEDURE — 3331090002 HH PPS REVENUE DEBIT

## 2018-08-14 PROCEDURE — 3331090001 HH PPS REVENUE CREDIT

## 2018-08-14 PROCEDURE — G0157 HHC PT ASSISTANT EA 15: HCPCS

## 2018-08-15 ENCOUNTER — HOME CARE VISIT (OUTPATIENT)
Dept: SCHEDULING | Facility: HOME HEALTH | Age: 83
End: 2018-08-15
Payer: MEDICARE

## 2018-08-15 VITALS — SYSTOLIC BLOOD PRESSURE: 110 MMHG | OXYGEN SATURATION: 98 % | HEART RATE: 54 BPM | DIASTOLIC BLOOD PRESSURE: 80 MMHG

## 2018-08-15 PROCEDURE — G0152 HHCP-SERV OF OT,EA 15 MIN: HCPCS

## 2018-08-15 PROCEDURE — 3331090002 HH PPS REVENUE DEBIT

## 2018-08-15 PROCEDURE — 3331090001 HH PPS REVENUE CREDIT

## 2018-08-16 ENCOUNTER — HOME CARE VISIT (OUTPATIENT)
Dept: SCHEDULING | Facility: HOME HEALTH | Age: 83
End: 2018-08-16
Payer: MEDICARE

## 2018-08-16 PROCEDURE — G0157 HHC PT ASSISTANT EA 15: HCPCS

## 2018-08-16 PROCEDURE — 3331090002 HH PPS REVENUE DEBIT

## 2018-08-16 PROCEDURE — 3331090001 HH PPS REVENUE CREDIT

## 2018-08-17 ENCOUNTER — HOME CARE VISIT (OUTPATIENT)
Dept: SCHEDULING | Facility: HOME HEALTH | Age: 83
End: 2018-08-17
Payer: MEDICARE

## 2018-08-17 VITALS
SYSTOLIC BLOOD PRESSURE: 118 MMHG | TEMPERATURE: 96.6 F | DIASTOLIC BLOOD PRESSURE: 66 MMHG | OXYGEN SATURATION: 97 % | HEART RATE: 52 BPM

## 2018-08-17 PROCEDURE — G0495 RN CARE TRAIN/EDU IN HH: HCPCS

## 2018-08-17 PROCEDURE — 3331090001 HH PPS REVENUE CREDIT

## 2018-08-17 PROCEDURE — 3331090002 HH PPS REVENUE DEBIT

## 2018-08-18 VITALS
RESPIRATION RATE: 14 BRPM | SYSTOLIC BLOOD PRESSURE: 120 MMHG | HEART RATE: 58 BPM | TEMPERATURE: 97.1 F | OXYGEN SATURATION: 96 % | DIASTOLIC BLOOD PRESSURE: 70 MMHG

## 2018-08-18 PROCEDURE — 3331090001 HH PPS REVENUE CREDIT

## 2018-08-18 PROCEDURE — 3331090002 HH PPS REVENUE DEBIT

## 2018-08-19 PROCEDURE — 3331090001 HH PPS REVENUE CREDIT

## 2018-08-19 PROCEDURE — 3331090002 HH PPS REVENUE DEBIT

## 2018-08-20 ENCOUNTER — HOME CARE VISIT (OUTPATIENT)
Dept: SCHEDULING | Facility: HOME HEALTH | Age: 83
End: 2018-08-20
Payer: MEDICARE

## 2018-08-20 ENCOUNTER — HOME CARE VISIT (OUTPATIENT)
Dept: HOME HEALTH SERVICES | Facility: HOME HEALTH | Age: 83
End: 2018-08-20
Payer: MEDICARE

## 2018-08-20 VITALS
DIASTOLIC BLOOD PRESSURE: 66 MMHG | RESPIRATION RATE: 14 BRPM | SYSTOLIC BLOOD PRESSURE: 110 MMHG | OXYGEN SATURATION: 95 % | TEMPERATURE: 97.6 F | HEART RATE: 65 BPM

## 2018-08-20 LAB
INR BLD: 3.3 (ref 0.9–1.1)
PT POC: 39.8 SECONDS (ref 11.8–14.9)

## 2018-08-20 PROCEDURE — G0495 RN CARE TRAIN/EDU IN HH: HCPCS

## 2018-08-20 PROCEDURE — 3331090002 HH PPS REVENUE DEBIT

## 2018-08-20 PROCEDURE — G0152 HHCP-SERV OF OT,EA 15 MIN: HCPCS

## 2018-08-20 PROCEDURE — 3331090001 HH PPS REVENUE CREDIT

## 2018-08-21 ENCOUNTER — HOME CARE VISIT (OUTPATIENT)
Dept: SCHEDULING | Facility: HOME HEALTH | Age: 83
End: 2018-08-21
Payer: MEDICARE

## 2018-08-21 VITALS — HEART RATE: 49 BPM | DIASTOLIC BLOOD PRESSURE: 66 MMHG | SYSTOLIC BLOOD PRESSURE: 110 MMHG | OXYGEN SATURATION: 96 %

## 2018-08-21 VITALS
SYSTOLIC BLOOD PRESSURE: 128 MMHG | OXYGEN SATURATION: 98 % | HEART RATE: 62 BPM | TEMPERATURE: 96.8 F | DIASTOLIC BLOOD PRESSURE: 64 MMHG | RESPIRATION RATE: 14 BRPM

## 2018-08-21 PROCEDURE — G0157 HHC PT ASSISTANT EA 15: HCPCS

## 2018-08-21 PROCEDURE — 3331090001 HH PPS REVENUE CREDIT

## 2018-08-21 PROCEDURE — 3331090002 HH PPS REVENUE DEBIT

## 2018-08-22 ENCOUNTER — HOME CARE VISIT (OUTPATIENT)
Dept: SCHEDULING | Facility: HOME HEALTH | Age: 83
End: 2018-08-22
Payer: MEDICARE

## 2018-08-22 PROCEDURE — 3331090002 HH PPS REVENUE DEBIT

## 2018-08-22 PROCEDURE — G0152 HHCP-SERV OF OT,EA 15 MIN: HCPCS

## 2018-08-22 PROCEDURE — 3331090001 HH PPS REVENUE CREDIT

## 2018-08-23 VITALS — SYSTOLIC BLOOD PRESSURE: 118 MMHG | HEART RATE: 63 BPM | DIASTOLIC BLOOD PRESSURE: 72 MMHG

## 2018-08-23 PROCEDURE — 3331090001 HH PPS REVENUE CREDIT

## 2018-08-23 PROCEDURE — 3331090002 HH PPS REVENUE DEBIT

## 2018-08-24 ENCOUNTER — HOME CARE VISIT (OUTPATIENT)
Dept: SCHEDULING | Facility: HOME HEALTH | Age: 83
End: 2018-08-24
Payer: MEDICARE

## 2018-08-24 ENCOUNTER — HOME CARE VISIT (OUTPATIENT)
Dept: HOME HEALTH SERVICES | Facility: HOME HEALTH | Age: 83
End: 2018-08-24
Payer: MEDICARE

## 2018-08-24 PROCEDURE — G0157 HHC PT ASSISTANT EA 15: HCPCS

## 2018-08-24 PROCEDURE — G0495 RN CARE TRAIN/EDU IN HH: HCPCS

## 2018-08-24 PROCEDURE — 3331090002 HH PPS REVENUE DEBIT

## 2018-08-24 PROCEDURE — 3331090001 HH PPS REVENUE CREDIT

## 2018-08-25 PROCEDURE — 3331090002 HH PPS REVENUE DEBIT

## 2018-08-25 PROCEDURE — 3331090001 HH PPS REVENUE CREDIT

## 2018-08-26 VITALS
TEMPERATURE: 97.6 F | SYSTOLIC BLOOD PRESSURE: 123 MMHG | OXYGEN SATURATION: 95 % | HEART RATE: 57 BPM | DIASTOLIC BLOOD PRESSURE: 68 MMHG | RESPIRATION RATE: 14 BRPM

## 2018-08-26 PROCEDURE — 3331090002 HH PPS REVENUE DEBIT

## 2018-08-26 PROCEDURE — 3331090001 HH PPS REVENUE CREDIT

## 2018-08-27 PROCEDURE — 3331090002 HH PPS REVENUE DEBIT

## 2018-08-27 PROCEDURE — 3331090001 HH PPS REVENUE CREDIT

## 2018-09-06 ENCOUNTER — HOSPITAL ENCOUNTER (OUTPATIENT)
Dept: LAB | Age: 83
Discharge: HOME | End: 2018-09-06
Payer: MEDICARE

## 2018-09-06 LAB
INR PPP: 4.4 (ref 0.8–1.2)
PROTHROMBIN TIME: 43.2 SEC (ref 11.5–15.2)

## 2018-09-06 PROCEDURE — 36415 COLL VENOUS BLD VENIPUNCTURE: CPT | Performed by: INTERNAL MEDICINE

## 2018-09-06 PROCEDURE — 85610 PROTHROMBIN TIME: CPT | Performed by: INTERNAL MEDICINE

## 2020-05-01 NOTE — PROGRESS NOTES
1930- Report and care received, assessment completed per flow sheet. Alert, oriented, cooperative. NAD. Denies pain. IVF infusing via IV pumps without difficulty. 2300- Reassessment completed and without change. 0400- Reassessment completed and without change. [Feeling Tired] : feeling tired [Joint Pain] : joint pain [Joint Stiffness] : joint stiffness [Limb Pain] : limb pain [Fever] : no fever [Feeling Poorly] : not feeling poorly [Chills] : no chills [Red Eyes] : eyes not red [Shortness Of Breath] : no shortness of breath [Eye Pain] : no eye pain [Cough] : no cough [FreeTextEntry6] : No pleuritic C/P [Skin Lesions] : no skin lesions

## 2022-01-20 ENCOUNTER — APPOINTMENT (OUTPATIENT)
Dept: CT IMAGING | Age: 87
End: 2022-01-20
Attending: PHYSICIAN ASSISTANT
Payer: MEDICARE

## 2022-01-20 ENCOUNTER — HOSPITAL ENCOUNTER (EMERGENCY)
Age: 87
Discharge: HOME | End: 2022-01-20
Attending: EMERGENCY MEDICINE
Payer: MEDICARE

## 2022-01-20 ENCOUNTER — APPOINTMENT (OUTPATIENT)
Dept: GENERAL RADIOLOGY | Age: 87
End: 2022-01-20
Attending: PHYSICIAN ASSISTANT
Payer: MEDICARE

## 2022-01-20 VITALS
HEART RATE: 66 BPM | RESPIRATION RATE: 16 BRPM | TEMPERATURE: 97.6 F | WEIGHT: 118 LBS | BODY MASS INDEX: 23.05 KG/M2 | DIASTOLIC BLOOD PRESSURE: 64 MMHG | OXYGEN SATURATION: 98 % | SYSTOLIC BLOOD PRESSURE: 166 MMHG

## 2022-01-20 DIAGNOSIS — R79.1 SUBTHERAPEUTIC INTERNATIONAL NORMALIZED RATIO (INR): ICD-10-CM

## 2022-01-20 DIAGNOSIS — I48.91 ATRIAL FIBRILLATION, UNSPECIFIED TYPE (HCC): ICD-10-CM

## 2022-01-20 DIAGNOSIS — I10 ESSENTIAL HYPERTENSION: ICD-10-CM

## 2022-01-20 DIAGNOSIS — R53.1 WEAKNESS: Primary | ICD-10-CM

## 2022-01-20 DIAGNOSIS — I25.10 CORONARY ARTERY DISEASE INVOLVING NATIVE HEART WITHOUT ANGINA PECTORIS, UNSPECIFIED VESSEL OR LESION TYPE: ICD-10-CM

## 2022-01-20 LAB
ALBUMIN SERPL-MCNC: 3.5 G/DL (ref 3.4–5)
ALBUMIN/GLOB SERPL: 1.1 {RATIO} (ref 0.8–1.7)
ALP SERPL-CCNC: 65 U/L (ref 45–117)
ALT SERPL-CCNC: 15 U/L (ref 13–56)
ANION GAP SERPL CALC-SCNC: 11 MMOL/L (ref 3–18)
APPEARANCE UR: CLEAR
AST SERPL-CCNC: 23 U/L (ref 10–38)
BACTERIA URNS QL MICRO: ABNORMAL /HPF
BASOPHILS # BLD: 0 K/UL (ref 0–0.1)
BASOPHILS NFR BLD: 0 % (ref 0–2)
BILIRUB SERPL-MCNC: 1 MG/DL (ref 0.2–1)
BILIRUB UR QL: ABNORMAL
BUN SERPL-MCNC: 15 MG/DL (ref 7–18)
BUN/CREAT SERPL: 15 (ref 12–20)
CALCIUM SERPL-MCNC: 10.1 MG/DL (ref 8.5–10.1)
CHLORIDE SERPL-SCNC: 101 MMOL/L (ref 100–111)
CO2 SERPL-SCNC: 26 MMOL/L (ref 21–32)
COLOR UR: ABNORMAL
CREAT SERPL-MCNC: 1 MG/DL (ref 0.6–1.3)
DIFFERENTIAL METHOD BLD: ABNORMAL
EOSINOPHIL # BLD: 0 K/UL (ref 0–0.4)
EOSINOPHIL NFR BLD: 0 % (ref 0–5)
EPITH CASTS URNS QL MICRO: NEGATIVE /LPF (ref 0–5)
ERYTHROCYTE [DISTWIDTH] IN BLOOD BY AUTOMATED COUNT: 14.2 % (ref 11.6–14.5)
GLOBULIN SER CALC-MCNC: 3.1 G/DL (ref 2–4)
GLUCOSE SERPL-MCNC: 112 MG/DL (ref 74–99)
GLUCOSE UR STRIP.AUTO-MCNC: NEGATIVE MG/DL
HCT VFR BLD AUTO: 43.5 % (ref 35–45)
HGB BLD-MCNC: 13.9 G/DL (ref 12–16)
HGB UR QL STRIP: NEGATIVE
IMM GRANULOCYTES # BLD AUTO: 0 K/UL (ref 0–0.04)
IMM GRANULOCYTES NFR BLD AUTO: 0 % (ref 0–0.5)
INR PPP: 1.2 (ref 0.8–1.2)
KETONES UR QL STRIP.AUTO: 80 MG/DL
LEUKOCYTE ESTERASE UR QL STRIP.AUTO: ABNORMAL
LYMPHOCYTES # BLD: 0.7 K/UL (ref 0.9–3.6)
LYMPHOCYTES NFR BLD: 9 % (ref 21–52)
MAGNESIUM SERPL-MCNC: 2.5 MG/DL (ref 1.6–2.6)
MCH RBC QN AUTO: 30.9 PG (ref 24–34)
MCHC RBC AUTO-ENTMCNC: 32 G/DL (ref 31–37)
MCV RBC AUTO: 96.7 FL (ref 78–100)
MONOCYTES # BLD: 0.4 K/UL (ref 0.05–1.2)
MONOCYTES NFR BLD: 5 % (ref 3–10)
MUCOUS THREADS URNS QL MICRO: ABNORMAL /LPF
NEUTS SEG # BLD: 6.5 K/UL (ref 1.8–8)
NEUTS SEG NFR BLD: 85 % (ref 40–73)
NITRITE UR QL STRIP.AUTO: NEGATIVE
NRBC # BLD: 0 K/UL (ref 0–0.01)
NRBC BLD-RTO: 0 PER 100 WBC
PH UR STRIP: 6.5 [PH] (ref 5–8)
PLATELET # BLD AUTO: 219 K/UL (ref 135–420)
PMV BLD AUTO: 10.7 FL (ref 9.2–11.8)
POTASSIUM SERPL-SCNC: 3.9 MMOL/L (ref 3.5–5.5)
PROT SERPL-MCNC: 6.6 G/DL (ref 6.4–8.2)
PROT UR STRIP-MCNC: 30 MG/DL
PROTHROMBIN TIME: 14.2 SEC (ref 11.5–15.2)
RBC # BLD AUTO: 4.5 M/UL (ref 4.2–5.3)
RBC #/AREA URNS HPF: NEGATIVE /HPF (ref 0–5)
SODIUM SERPL-SCNC: 138 MMOL/L (ref 136–145)
SP GR UR REFRACTOMETRY: 1.02 (ref 1–1.03)
TROPONIN-HIGH SENSITIVITY: 28 NG/L (ref 0–54)
UROBILINOGEN UR QL STRIP.AUTO: 1 EU/DL (ref 0.2–1)
WBC # BLD AUTO: 7.6 K/UL (ref 4.6–13.2)
WBC URNS QL MICRO: ABNORMAL /HPF (ref 0–5)

## 2022-01-20 PROCEDURE — 81001 URINALYSIS AUTO W/SCOPE: CPT

## 2022-01-20 PROCEDURE — 85025 COMPLETE CBC W/AUTO DIFF WBC: CPT

## 2022-01-20 PROCEDURE — 84484 ASSAY OF TROPONIN QUANT: CPT

## 2022-01-20 PROCEDURE — 87086 URINE CULTURE/COLONY COUNT: CPT

## 2022-01-20 PROCEDURE — 80053 COMPREHEN METABOLIC PANEL: CPT

## 2022-01-20 PROCEDURE — 70450 CT HEAD/BRAIN W/O DYE: CPT

## 2022-01-20 PROCEDURE — 85610 PROTHROMBIN TIME: CPT

## 2022-01-20 PROCEDURE — 99284 EMERGENCY DEPT VISIT MOD MDM: CPT

## 2022-01-20 PROCEDURE — 71045 X-RAY EXAM CHEST 1 VIEW: CPT

## 2022-01-20 PROCEDURE — 93005 ELECTROCARDIOGRAM TRACING: CPT

## 2022-01-20 PROCEDURE — 83735 ASSAY OF MAGNESIUM: CPT

## 2022-01-20 NOTE — ED PROVIDER NOTES
EMERGENCY DEPARTMENT HISTORY AND PHYSICAL EXAM    Date: 1/20/2022  Patient Name: Xavier Wagner    History of Presenting Illness     Time Seen: 2:29 PM    Chief Complaint   Patient presents with    Fatigue       History Provided By: Patient    Additional History (Context):   Xavier Wagner is a 80 y.o. female with a history of atrial fibrillation on Coumadin, coronary artery disease status post old MI, hypertension presents to the emergency room by EMS with complaints of generalized weakness/near syncope. According to EMS, patient was on the toilet. Her son, who resides with her, attempted to call her. Did not receive any kind of response. Evidently this continued. Patient complained of feeling weak after getting off the toilet. Therefore EMS was called. Patient, when EMS arrived, started feeling more like her normal self. Now she has no complaints. States \"I did not want to come\". Denies any chest pain, shortness of breath. Does have chronic abdominal discomfort. No urinary symptoms. Afebrile. No chills or aches. Eating and drinking fine. PCP: Maddi Moon MD    Current Outpatient Medications   Medication Sig Dispense Refill    brimonidine (ALPHAGAN) 0.15 % ophthalmic solution Administer 1 Drop to both eyes daily.  warfarin (COUMADIN) 2 mg tablet Take 1 Tab by mouth daily. 30 Tab 1    atorvastatin (LIPITOR) 80 mg tablet Take 1 Tab by mouth daily. 30 Tab 2    losartan (COZAAR) 100 mg tablet Take 100 mg by mouth daily.  latanoprost (XALATAN) 0.005 % ophthalmic solution Administer 1 Drop to both eyes nightly.  omeprazole (PRILOSEC) 20 mg capsule Take 20 mg by mouth daily.          Past History     Past Medical History:  Past Medical History:   Diagnosis Date    Arthritis     Atrial fibrillation ([address]     CAD (coronary artery disease)     Edema     Gastrointestinal disorder     High cholesterol     Hypertension     Hypoglycemia     Hypoglycemia     IBS (irritable bowel syndrome)     MI, old        Past Surgical History:  Past Surgical History:   Procedure Laterality Date    HX CHOLECYSTECTOMY      HX KNEE REPLACEMENT      HX ORTHOPAEDIC      Left TKR       Family History:  No family history on file. Social History:  Social History     Tobacco Use    Smoking status: Never Smoker    Smokeless tobacco: Not on file   Substance Use Topics    Alcohol use: Yes     Alcohol/week: 0.8 standard drinks     Types: 1 Glasses of wine per week     Comment: every few weeks    Drug use: Not on file       Allergies: Allergies   Allergen Reactions    Amoxicillin Rash         Review of Systems   Review of Systems   Constitutional: Negative for chills, diaphoresis and fever. HENT: Negative for congestion and sore throat. Respiratory: Negative for cough, chest tightness and shortness of breath. Cardiovascular: Negative for chest pain, palpitations and leg swelling. Gastrointestinal: Positive for abdominal pain. Negative for constipation, diarrhea, nausea and vomiting. Genitourinary: Negative for decreased urine volume and dysuria. Neurological: Positive for syncope and weakness. Negative for dizziness, tremors, light-headedness and headaches. ?  Near syncope   Psychiatric/Behavioral: Negative for behavioral problems and confusion. Physical Exam     Vitals:    01/20/22 1724 01/20/22 1739 01/20/22 1754 01/20/22 1809   BP:   (!) 166/64    Pulse:       Resp:       Temp:       SpO2: 99% 97% 97% 98%   Weight:         Physical Exam  Vitals and nursing note reviewed. Constitutional:       General: She is not in acute distress. Appearance: Normal appearance. She is well-developed, well-groomed and normal weight. She is not ill-appearing. Comments: Pleasant 79-year-old female no apparent distress. Resting comfortably. Vital signs show her blood pressure to be minimally elevated 166/64.      HENT:      Mouth/Throat:      Mouth: Mucous membranes are moist. Pharynx: Oropharynx is clear. Eyes:      Extraocular Movements: Extraocular movements intact. Conjunctiva/sclera: Conjunctivae normal.      Pupils: Pupils are equal, round, and reactive to light. Cardiovascular:      Rate and Rhythm: Normal rate. Rhythm irregular. Pulses: Normal pulses. Heart sounds: Normal heart sounds. Pulmonary:      Effort: Pulmonary effort is normal.      Breath sounds: Normal breath sounds and air entry. Abdominal:      General: Bowel sounds are normal.      Palpations: Abdomen is soft. Tenderness: There is no abdominal tenderness. Musculoskeletal:      Cervical back: Neck supple. Right lower leg: No edema. Left lower leg: No edema. Lymphadenopathy:      Cervical: No cervical adenopathy. Skin:     General: Skin is warm and dry. Neurological:      General: No focal deficit present. Mental Status: She is alert and oriented to person, place, and time. GCS: GCS eye subscore is 4. GCS verbal subscore is 5. GCS motor subscore is 6. Cranial Nerves: Cranial nerves are intact. No cranial nerve deficit or facial asymmetry. Sensory: Sensation is intact. Motor: Motor function is intact. Coordination: Coordination is intact. Finger-Nose-Finger Test normal.      Comments: Did not evaluate gait   Psychiatric:         Attention and Perception: Attention normal.         Mood and Affect: Mood and affect normal.         Speech: Speech normal.         Behavior: Behavior is cooperative. Thought Content:  Thought content normal.         Cognition and Memory: Cognition and memory normal.         Nursing note and vitals reviewed         Diagnostic Study Results     Labs -     Recent Results (from the past 12 hour(s))   EKG, 12 LEAD, INITIAL    Collection Time: 01/20/22  2:43 PM   Result Value Ref Range    Ventricular Rate 65 BPM    QRS Duration 90 ms    Q-T Interval 468 ms    QTC Calculation (Bezet) 486 ms    Calculated R Axis -34 degrees    Calculated T Axis 33 degrees    Diagnosis       Junctional rhythm  Left axis deviation  Minimal voltage criteria for LVH, may be normal variant ( Finley product )  Anteroseptal infarct (cited on or before 08-FEB-2013)  Abnormal ECG  When compared with ECG of 18-JUL-2018 08:58,  Significant changes have occurred     CBC WITH AUTOMATED DIFF    Collection Time: 01/20/22  3:32 PM   Result Value Ref Range    WBC 7.6 4.6 - 13.2 K/uL    RBC 4.50 4. 20 - 5.30 M/uL    HGB 13.9 12.0 - 16.0 g/dL    HCT 43.5 35.0 - 45.0 %    MCV 96.7 78.0 - 100.0 FL    MCH 30.9 24.0 - 34.0 PG    MCHC 32.0 31.0 - 37.0 g/dL    RDW 14.2 11.6 - 14.5 %    PLATELET 779 135 - 052 K/uL    MPV 10.7 9.2 - 11.8 FL    NRBC 0.0 0  WBC    ABSOLUTE NRBC 0.00 0.00 - 0.01 K/uL    NEUTROPHILS 85 (H) 40 - 73 %    LYMPHOCYTES 9 (L) 21 - 52 %    MONOCYTES 5 3 - 10 %    EOSINOPHILS 0 0 - 5 %    BASOPHILS 0 0 - 2 %    IMMATURE GRANULOCYTES 0 0.0 - 0.5 %    ABS. NEUTROPHILS 6.5 1.8 - 8.0 K/UL    ABS. LYMPHOCYTES 0.7 (L) 0.9 - 3.6 K/UL    ABS. MONOCYTES 0.4 0.05 - 1.2 K/UL    ABS. EOSINOPHILS 0.0 0.0 - 0.4 K/UL    ABS. BASOPHILS 0.0 0.0 - 0.1 K/UL    ABS. IMM. GRANS. 0.0 0.00 - 0.04 K/UL    DF AUTOMATED     METABOLIC PANEL, COMPREHENSIVE    Collection Time: 01/20/22  3:32 PM   Result Value Ref Range    Sodium 138 136 - 145 mmol/L    Potassium 3.9 3.5 - 5.5 mmol/L    Chloride 101 100 - 111 mmol/L    CO2 26 21 - 32 mmol/L    Anion gap 11 3.0 - 18 mmol/L    Glucose 112 (H) 74 - 99 mg/dL    BUN 15 7.0 - 18 MG/DL    Creatinine 1.00 0.6 - 1.3 MG/DL    BUN/Creatinine ratio 15 12 - 20      GFR est AA >60 >60 ml/min/1.73m2    GFR est non-AA 52 (L) >60 ml/min/1.73m2    Calcium 10.1 8.5 - 10.1 MG/DL    Bilirubin, total 1.0 0.2 - 1.0 MG/DL    ALT (SGPT) 15 13 - 56 U/L    AST (SGOT) 23 10 - 38 U/L    Alk.  phosphatase 65 45 - 117 U/L    Protein, total 6.6 6.4 - 8.2 g/dL    Albumin 3.5 3.4 - 5.0 g/dL    Globulin 3.1 2.0 - 4.0 g/dL    A-G Ratio 1.1 0.8 - 1.7 PROTHROMBIN TIME + INR    Collection Time: 01/20/22  3:32 PM   Result Value Ref Range    Prothrombin time 14.2 11.5 - 15.2 sec    INR 1.2 0.8 - 1.2     MAGNESIUM    Collection Time: 01/20/22  3:32 PM   Result Value Ref Range    Magnesium 2.5 1.6 - 2.6 mg/dL   TROPONIN-HIGH SENSITIVITY    Collection Time: 01/20/22  3:32 PM   Result Value Ref Range    Troponin-High Sensitivity 28 0 - 54 ng/L   URINALYSIS W/ RFLX MICROSCOPIC    Collection Time: 01/20/22  5:55 PM   Result Value Ref Range    Color DARK YELLOW      Appearance CLEAR      Specific gravity 1.018 1.005 - 1.030      pH (UA) 6.5 5.0 - 8.0      Protein 30 (A) NEG mg/dL    Glucose Negative NEG mg/dL    Ketone 80 (A) NEG mg/dL    Bilirubin MODERATE (A) NEG      Blood Negative NEG      Urobilinogen 1.0 0.2 - 1.0 EU/dL    Nitrites Negative NEG      Leukocyte Esterase TRACE (A) NEG     URINE MICROSCOPIC ONLY    Collection Time: 01/20/22  5:55 PM   Result Value Ref Range    WBC 0 to 3 0 - 5 /hpf    RBC Negative 0 - 5 /hpf    Epithelial cells Negative 0 - 5 /lpf    Bacteria FEW (A) NEG /hpf    Mucus FEW (A) NEG /lpf       Radiologic Studies   CT HEAD WO CONT   Final Result      1. No CT evidence of an acute intracranial abnormality - in particular, no   acute cortical infarct, hemorrhage, or mass effect. 2.  Moderate cerebral atrophy/volume loss and periventricular white matter   changes, most commonly seen with chronic microvascular disease. XR CHEST PORT   Final Result      No active cardiopulmonary disease. CT Results  (Last 48 hours)               01/20/22 1559  CT HEAD WO CONT Final result    Impression:      1. No CT evidence of an acute intracranial abnormality - in particular, no   acute cortical infarct, hemorrhage, or mass effect. 2.  Moderate cerebral atrophy/volume loss and periventricular white matter   changes, most commonly seen with chronic microvascular disease.                 Narrative:  EXAM: CT HEAD, WITHOUT IV CONTRAST       INDICATION: Altered level of consciousness       COMPARISON: 7/18/2018       TECHNIQUE: Multiple axial CT images of the head were obtained extending from the   skull base through the vertex. Additional coronal and sagittal reformations were   also performed. One or more dose reduction techniques were used on this CT:   automated exposure control, adjustment of the mAs and/or kVp according to   patient size, and iterative reconstruction techniques. The specific techniques   used on this CT exam have been documented in the patient's electronic medical   record. Digital Imaging and Communications in Medicine (DICOM) format image   data are available to nonaffiliated external healthcare facilities or entities   on a secure, media free, reciprocally searchable basis with patient   authorization for at least a 12-month period after this study. _______________       FINDINGS:       BRAIN AND POSTERIOR FOSSA: There is generalized prominence of the ventricular   system, associated with proportional widening of the cortical cerebral sulci,   compatible with generalized volume loss. Hazy hypoattenuation identified along   the periventricular white matter, a nonspecific finding which is most commonly   encountered in the setting of chronic microvascular disease. There is no   intracranial hemorrhage, mass effect, or midline shift. There are no   significant additional areas of abnormal parenchymal attenuation. EXTRA-AXIAL SPACES AND MENINGES: There are no abnormal extra-axial fluid   collections. CALVARIUM: No acute osseous abnormality. OTHER: The visualized portions of the paranasal sinuses and mastoid air cells   are clear.       _______________               CXR Results  (Last 48 hours)               01/20/22 1457  XR CHEST PORT Final result    Impression:      No active cardiopulmonary disease.        Narrative:  EXAM: CHEST RADIOGRAPH, SINGLE VIEW       CLINICAL INDICATION/HISTORY: Syncopal episode, fatigue       COMPARISON: 7/24/2018       TECHNIQUE: Portable frontal view of the chest was obtained.        _______________       FINDINGS:       SUPPORT DEVICES: None. HEART AND MEDIASTINUM: Cardiomediastinal silhouette appears within normal   limits. Normal caliber thoracic aorta. No central vascular congestion. LUNGS AND PLEURAL SPACES: Lungs are well aerated with no confluent airspace   opacity. No pleural effusion or pneumothorax. BONY THORAX AND SOFT TISSUES: Degenerative rotoscoliosis of the thoracic spine. No acute osseous abnormality. Right upper quadrant surgical clips of prior   cholecystectomy. _______________                   Medical Decision Making   I am the first provider for this patient. I reviewed the vital signs, available nursing notes, past medical history, past surgical history, family history and social history. Vital Signs-Reviewed the patient's vital signs. Pulse Oximetry Analysis 98% on room air    Records Reviewed: Nursing Notes    DDX:  Generalized weakness  Rule out infectious process  Volume depletion/dehydration/electrolyte imbalance  Doubt CVA, TIA  ? Vasovagal  Consider ACS with history of coronary disease, prior MI    Procedures:  Procedures    ED Course:   Initial assessment performed. The patients presenting problems have been discussed, and they are in agreement with the care plan formulated and outlined with them. I have encouraged them to ask questions as they arise throughout their visit. ED Physician Discussion Note:   CBC with normal white blood cell count. There is evidence of a left shift  Urinalysis shows normal specific gravity. High ketones. We will still send for culture although low suspicion of infection  Subtherapeutic INR  Chemistries normal  Liver function test normal  Troponin 28  Head scan shows chronic changes    Patient has been resting comfortably.   No complaints. Plan is to let patient go home to follow-up with PCP. Return if any issues. Diagnosis and Disposition       DISCHARGE NOTE:    Aidan Kingston's  results have been reviewed with her. She has been counseled regarding her diagnosis, treatment, and plan. She verbally conveys understanding and agreement of the signs, symptoms, diagnosis, treatment and prognosis and additionally agrees to follow up as discussed. She also agrees with the care-plan and conveys that all of her questions have been answered. I have also provided discharge instructions for her that include: educational information regarding their diagnosis and treatment, and list of reasons why they would want to return to the ED prior to their follow-up appointment, should her condition change. She has been provided with education for proper emergency department utilization. CLINICAL IMPRESSION:    1. Weakness    2. Atrial fibrillation, unspecified type ([address]    3. Subtherapeutic international normalized ratio (INR)    4. Essential hypertension    5. Coronary artery disease involving native heart without angina pectoris, unspecified vessel or lesion type        PLAN:  1. D/C Home  2. Current Discharge Medication List        3. Follow-up Information     Follow up With Specialties Details Why Contact Info    Allie Nunez MD Internal Medicine Call  Follow-up with your PCP next week [id number]  [phone]      [facility] EMERGENCY DEPT Emergency Medicine  If symptoms worsen, As needed [address]  [facility] [id number]  [phone]        ____________________________________     Please note that this dictation was completed with [employer], the [employer] voice recognition software. Quite often unanticipated grammatical, syntax, homophones, and other interpretive errors are inadvertently transcribed by the computer software. Please disregard these errors.   Please excuse any errors that have escaped final proofreading.

## 2022-01-20 NOTE — ED TRIAGE NOTES
Pt came in by ems from home. She was on the toilet when her son was calling her and she didn't respond. When she went to get off the toilet she felt weak. Pt is aaox3 doesn't know dates.

## 2022-01-20 NOTE — ED NOTES
With patients permission report given to Sean the granddaughter. Both verbally understood,   Pt will be picked up by Sean.

## 2022-01-20 NOTE — DISCHARGE INSTRUCTIONS
Go home and rest  Keep yourself well-hydrated  Continue all your current medications  Talk to your family physician or whoever prescribes your Coumadin to consider adjusting medication due to low INR levels  Worse?  Return to ER immediately

## 2022-01-21 LAB
BACTERIA SPEC CULT: NORMAL
CALCULATED R AXIS, ECG10: -34 DEGREES
CALCULATED T AXIS, ECG11: 33 DEGREES
DIAGNOSIS, 93000: NORMAL
Q-T INTERVAL, ECG07: 468 MS
QRS DURATION, ECG06: 90 MS
QTC CALCULATION (BEZET), ECG08: 486 MS
SERVICE CMNT-IMP: NORMAL
VENTRICULAR RATE, ECG03: 65 BPM

## 2022-01-29 ENCOUNTER — APPOINTMENT (OUTPATIENT)
Dept: CT IMAGING | Age: 87
End: 2022-01-29
Attending: EMERGENCY MEDICINE
Payer: MEDICARE

## 2022-01-29 ENCOUNTER — HOSPITAL ENCOUNTER (EMERGENCY)
Age: 87
Discharge: HOME | End: 2022-01-29
Attending: EMERGENCY MEDICINE
Payer: MEDICARE

## 2022-01-29 ENCOUNTER — APPOINTMENT (OUTPATIENT)
Dept: GENERAL RADIOLOGY | Age: 87
End: 2022-01-29
Attending: EMERGENCY MEDICINE
Payer: MEDICARE

## 2022-01-29 VITALS
RESPIRATION RATE: 13 BRPM | OXYGEN SATURATION: 99 % | DIASTOLIC BLOOD PRESSURE: 90 MMHG | HEART RATE: 76 BPM | SYSTOLIC BLOOD PRESSURE: 158 MMHG | BODY MASS INDEX: 23.05 KG/M2 | TEMPERATURE: 96.8 F | WEIGHT: 118 LBS

## 2022-01-29 DIAGNOSIS — E86.0 DEHYDRATION: Primary | ICD-10-CM

## 2022-01-29 DIAGNOSIS — R55 NEAR SYNCOPE: ICD-10-CM

## 2022-01-29 LAB
ALBUMIN SERPL-MCNC: 3.3 G/DL (ref 3.4–5)
ALBUMIN/GLOB SERPL: 0.9 {RATIO} (ref 0.8–1.7)
ALP SERPL-CCNC: 61 U/L (ref 45–117)
ALT SERPL-CCNC: 11 U/L (ref 13–56)
ANION GAP SERPL CALC-SCNC: 11 MMOL/L (ref 3–18)
APPEARANCE UR: CLEAR
AST SERPL-CCNC: 20 U/L (ref 10–38)
BACTERIA URNS QL MICRO: ABNORMAL /HPF
BASOPHILS # BLD: 0 K/UL (ref 0–0.1)
BASOPHILS NFR BLD: 0 % (ref 0–2)
BILIRUB SERPL-MCNC: 0.8 MG/DL (ref 0.2–1)
BILIRUB UR QL: NEGATIVE
BUN SERPL-MCNC: 17 MG/DL (ref 7–18)
BUN/CREAT SERPL: 17 (ref 12–20)
CALCIUM SERPL-MCNC: 10.4 MG/DL (ref 8.5–10.1)
CHLORIDE SERPL-SCNC: 102 MMOL/L (ref 100–111)
CO2 SERPL-SCNC: 26 MMOL/L (ref 21–32)
COLOR UR: ABNORMAL
CREAT SERPL-MCNC: 1.02 MG/DL (ref 0.6–1.3)
DIFFERENTIAL METHOD BLD: ABNORMAL
EOSINOPHIL # BLD: 0.1 K/UL (ref 0–0.4)
EOSINOPHIL NFR BLD: 1 % (ref 0–5)
EPITH CASTS URNS QL MICRO: ABNORMAL /LPF (ref 0–5)
ERYTHROCYTE [DISTWIDTH] IN BLOOD BY AUTOMATED COUNT: 14.2 % (ref 11.6–14.5)
GLOBULIN SER CALC-MCNC: 3.7 G/DL (ref 2–4)
GLUCOSE SERPL-MCNC: 110 MG/DL (ref 74–99)
GLUCOSE UR STRIP.AUTO-MCNC: NEGATIVE MG/DL
HCT VFR BLD AUTO: 42.7 % (ref 35–45)
HGB BLD-MCNC: 13.6 G/DL (ref 12–16)
HGB UR QL STRIP: NEGATIVE
HYALINE CASTS URNS QL MICRO: ABNORMAL /LPF (ref 0–2)
IMM GRANULOCYTES # BLD AUTO: 0.1 K/UL (ref 0–0.04)
IMM GRANULOCYTES NFR BLD AUTO: 1 % (ref 0–0.5)
KETONES UR QL STRIP.AUTO: 40 MG/DL
LACTATE SERPL-SCNC: 1.6 MMOL/L (ref 0.4–2)
LEUKOCYTE ESTERASE UR QL STRIP.AUTO: NEGATIVE
LYMPHOCYTES # BLD: 2.5 K/UL (ref 0.9–3.6)
LYMPHOCYTES NFR BLD: 40 % (ref 21–52)
MCH RBC QN AUTO: 30.7 PG (ref 24–34)
MCHC RBC AUTO-ENTMCNC: 31.9 G/DL (ref 31–37)
MCV RBC AUTO: 96.4 FL (ref 78–100)
MONOCYTES # BLD: 0.5 K/UL (ref 0.05–1.2)
MONOCYTES NFR BLD: 7 % (ref 3–10)
NEUTS SEG # BLD: 3.2 K/UL (ref 1.8–8)
NEUTS SEG NFR BLD: 51 % (ref 40–73)
NITRITE UR QL STRIP.AUTO: NEGATIVE
NRBC # BLD: 0 K/UL (ref 0–0.01)
NRBC BLD-RTO: 0 PER 100 WBC
PH UR STRIP: 6 [PH] (ref 5–8)
PLATELET # BLD AUTO: 266 K/UL (ref 135–420)
PMV BLD AUTO: 10.7 FL (ref 9.2–11.8)
POTASSIUM SERPL-SCNC: 3.4 MMOL/L (ref 3.5–5.5)
PROT SERPL-MCNC: 7 G/DL (ref 6.4–8.2)
PROT UR STRIP-MCNC: 30 MG/DL
RBC # BLD AUTO: 4.43 M/UL (ref 4.2–5.3)
RBC #/AREA URNS HPF: NEGATIVE /HPF (ref 0–5)
SODIUM SERPL-SCNC: 139 MMOL/L (ref 136–145)
SP GR UR REFRACTOMETRY: 1.02 (ref 1–1.03)
UROBILINOGEN UR QL STRIP.AUTO: 1 EU/DL (ref 0.2–1)
WBC # BLD AUTO: 6.3 K/UL (ref 4.6–13.2)
WBC URNS QL MICRO: NEGATIVE /HPF (ref 0–5)

## 2022-01-29 PROCEDURE — 93005 ELECTROCARDIOGRAM TRACING: CPT

## 2022-01-29 PROCEDURE — 80053 COMPREHEN METABOLIC PANEL: CPT

## 2022-01-29 PROCEDURE — 51798 US URINE CAPACITY MEASURE: CPT

## 2022-01-29 PROCEDURE — 70450 CT HEAD/BRAIN W/O DYE: CPT

## 2022-01-29 PROCEDURE — 71045 X-RAY EXAM CHEST 1 VIEW: CPT

## 2022-01-29 PROCEDURE — 81001 URINALYSIS AUTO W/SCOPE: CPT

## 2022-01-29 PROCEDURE — 85025 COMPLETE CBC W/AUTO DIFF WBC: CPT

## 2022-01-29 PROCEDURE — 99285 EMERGENCY DEPT VISIT HI MDM: CPT

## 2022-01-29 PROCEDURE — 83605 ASSAY OF LACTIC ACID: CPT

## 2022-01-29 NOTE — ED PROVIDER NOTES
80-year-old female presents emergency department via ambulance family reports that they were getting her out of the bathtub and she had a second where she laid back as they are picking up and did not respond. Momentary loss of consciousness possible reported however patient has no complaints on arrival.  Seen for a similar episode by Dr. Neel Nesbitt on 20 January and went through investigation for syncope versus near syncope with no emergent condition identified. She arrived to the emergency department today no acute distress afebrile vital signs are within normal limits patient at baseline mental status           Past Medical History:   Diagnosis Date    Arthritis     Atrial fibrillation ([address]     CAD (coronary artery disease)     Edema     Gastrointestinal disorder     High cholesterol     Hypertension     Hypoglycemia     Hypoglycemia     IBS (irritable bowel syndrome)     MI, old        Past Surgical History:   Procedure Laterality Date    HX CHOLECYSTECTOMY      HX KNEE REPLACEMENT      HX ORTHOPAEDIC      Left TKR         History reviewed. No pertinent family history. Social History     Socioeconomic History    Marital status:      Spouse name: Not on file    Number of children: Not on file    Years of education: Not on file    Highest education level: Not on file   Occupational History    Not on file   Tobacco Use    Smoking status: Never Smoker    Smokeless tobacco: Never Used   Substance and Sexual Activity    Alcohol use:  Yes     Alcohol/week: 0.8 standard drinks     Types: 1 Glasses of wine per week     Comment: every few weeks    Drug use: Not on file    Sexual activity: Not on file   Other Topics Concern    Not on file   Social History Narrative    Not on file     Social Determinants of Health     Financial Resource Strain:     Difficulty of Paying Living Expenses: Not on file   Food Insecurity:     Worried About Running Out of Food in the Last Year: Not on file    Ran Out of Food in the Last Year: Not on file   Transportation Needs:     Lack of Transportation (Medical): Not on file    Lack of Transportation (Non-Medical): Not on file   Physical Activity:     Days of Exercise per Week: Not on file    Minutes of Exercise per Session: Not on file   Stress:     Feeling of Stress : Not on file   Social Connections:     Frequency of Communication with Friends and Family: Not on file    Frequency of Social Gatherings with Friends and Family: Not on file    Attends Hoahaoism Services: Not on file    Active Member of [address] [place] [address] [employer] or Organizations: Not on file    Attends Club or Organization Meetings: Not on file    Marital Status: Not on file   Intimate Partner Violence:     Fear of Current or Ex-Partner: Not on file    Emotionally Abused: Not on file    Physically Abused: Not on file    Sexually Abused: Not on file   Housing Stability:     Unable to Pay for Housing in the Last Year: Not on file    Number of [place] in the Last Year: Not on file    Unstable Housing in the Last Year: Not on file         ALLERGIES: Amoxicillin    Review of Systems   Constitutional: Positive for activity change and chills. Negative for appetite change, diaphoresis, fatigue, fever and unexpected weight change. HENT: Negative. Respiratory: Negative. Cardiovascular: Negative. Gastrointestinal: Negative. Genitourinary: Negative. Musculoskeletal: Negative. Neurological: Positive for syncope and weakness. Negative for dizziness, seizures, facial asymmetry, speech difficulty, light-headedness, numbness and headaches. Hematological: Negative. Psychiatric/Behavioral: Negative.         Vitals:    01/29/22 1055 01/29/22 1058 01/29/22 1113 01/29/22 1128   BP: (!) 150/74 (!) 162/97 (!) 155/99 (!) 158/90   Pulse: 81 73 79 76   Resp: 18 27 23 13   Temp: 96.8 °F (36 °C)      SpO2: 100% 96% 100% 99%   Weight: 53.5 kg (118 lb)               Physical Exam  Vitals and nursing note reviewed. Constitutional:       General: She is not in acute distress. Appearance: Normal appearance. She is normal weight. She is not ill-appearing or toxic-appearing. HENT:      Head: Normocephalic and atraumatic. Right Ear: Tympanic membrane and ear canal normal.      Left Ear: Tympanic membrane and ear canal normal.      Nose: Congestion present. Mouth/Throat:      Mouth: Mucous membranes are dry. Pharynx: Oropharynx is clear. No oropharyngeal exudate or posterior oropharyngeal erythema. Eyes:      General:         Right eye: No discharge. Left eye: No discharge. Extraocular Movements: Extraocular movements intact. Conjunctiva/sclera: Conjunctivae normal.      Pupils: Pupils are equal, round, and reactive to light. Neck:      Vascular: No carotid bruit. Cardiovascular:      Rate and Rhythm: Normal rate and regular rhythm. Pulses: Normal pulses. Heart sounds: Normal heart sounds. No murmur heard. No friction rub. No gallop. Pulmonary:      Effort: Pulmonary effort is normal. No respiratory distress. Breath sounds: No stridor. No wheezing or rhonchi. Abdominal:      General: Abdomen is flat. Bowel sounds are normal. There is no distension. Palpations: Abdomen is soft. There is no mass. Tenderness: There is no abdominal tenderness. Hernia: No hernia is present. Musculoskeletal:         General: No swelling, tenderness, deformity or signs of injury. Normal range of motion. Cervical back: Normal range of motion and neck supple. No rigidity or tenderness. Lymphadenopathy:      Cervical: No cervical adenopathy. Skin:     General: Skin is warm and dry. Capillary Refill: Capillary refill takes less than 2 seconds. Neurological:      General: No focal deficit present. Mental Status: She is alert. Mental status is at baseline.    Psychiatric:         Mood and Affect: Mood normal.         Behavior: Behavior normal. Thought Content: Thought content normal.          MDM  Number of Diagnoses or Management Options  Dehydration  Near syncope  Diagnosis management comments: 80-year-old female presented to the emergency room department via [id number] near syncopal episode when her family is getting her out of the tub. She arrived in no acute distress vital signs were unremarkable for emergent pathology. On exam patient had lungs clear to auscultation heart sounds were unremarkable mucous membranes are slightly dry by history and previous visits patient is at her baseline with just mild lethargy. Labs were sent EKG was done labs showed no acute abnormality including no major anemia electrolyte imbalance or urinary tract infection. EKG is similar to prior with no ST or T wave elevations indicative of a STEMI. CT of the head was unremarkable for acute pathology. Possible dehydration associated with near syncope patient was given IV hydration.   And discharged home with outpatient follow-up         Procedures

## 2022-01-31 LAB
CALCULATED R AXIS, ECG10: -48 DEGREES
CALCULATED T AXIS, ECG11: 95 DEGREES
DIAGNOSIS, 93000: NORMAL
Q-T INTERVAL, ECG07: 400 MS
QRS DURATION, ECG06: 106 MS
QTC CALCULATION (BEZET), ECG08: 456 MS
VENTRICULAR RATE, ECG03: 78 BPM
